# Patient Record
Sex: MALE | Race: WHITE | NOT HISPANIC OR LATINO | Employment: OTHER | URBAN - METROPOLITAN AREA
[De-identification: names, ages, dates, MRNs, and addresses within clinical notes are randomized per-mention and may not be internally consistent; named-entity substitution may affect disease eponyms.]

---

## 2017-04-20 ENCOUNTER — ALLSCRIPTS OFFICE VISIT (OUTPATIENT)
Dept: OTHER | Facility: OTHER | Age: 62
End: 2017-04-20

## 2017-04-20 DIAGNOSIS — R07.9 CHEST PAIN: ICD-10-CM

## 2017-05-03 ENCOUNTER — HOSPITAL ENCOUNTER (OUTPATIENT)
Dept: NON INVASIVE DIAGNOSTICS | Facility: HOSPITAL | Age: 62
Discharge: HOME | End: 2017-05-03
Payer: COMMERCIAL

## 2017-05-03 DIAGNOSIS — R07.9 CHEST PAIN: ICD-10-CM

## 2017-05-03 PROCEDURE — 93017 CV STRESS TEST TRACING ONLY: CPT

## 2017-05-18 ENCOUNTER — ALLSCRIPTS OFFICE VISIT (OUTPATIENT)
Dept: OTHER | Facility: OTHER | Age: 62
End: 2017-05-18

## 2017-08-09 ENCOUNTER — ALLSCRIPTS OFFICE VISIT (OUTPATIENT)
Dept: OTHER | Facility: OTHER | Age: 62
End: 2017-08-09

## 2017-08-09 DIAGNOSIS — Z12.5 ENCOUNTER FOR SCREENING FOR MALIGNANT NEOPLASM OF PROSTATE: ICD-10-CM

## 2017-08-09 DIAGNOSIS — E55.9 VITAMIN D DEFICIENCY: ICD-10-CM

## 2017-08-09 DIAGNOSIS — Z13.220 ENCOUNTER FOR SCREENING FOR LIPOID DISORDERS: ICD-10-CM

## 2017-08-09 DIAGNOSIS — R53.83 OTHER FATIGUE: ICD-10-CM

## 2017-08-19 ENCOUNTER — TRANSCRIBE ORDERS (OUTPATIENT)
Dept: ADMINISTRATIVE | Facility: HOSPITAL | Age: 62
End: 2017-08-19

## 2017-08-19 ENCOUNTER — APPOINTMENT (OUTPATIENT)
Dept: LAB | Facility: MEDICAL CENTER | Age: 62
End: 2017-08-19
Payer: COMMERCIAL

## 2017-08-19 DIAGNOSIS — R53.83 OTHER FATIGUE: ICD-10-CM

## 2017-08-19 DIAGNOSIS — Z13.220 ENCOUNTER FOR SCREENING FOR LIPOID DISORDERS: ICD-10-CM

## 2017-08-19 DIAGNOSIS — Z12.5 ENCOUNTER FOR SCREENING FOR MALIGNANT NEOPLASM OF PROSTATE: ICD-10-CM

## 2017-08-19 DIAGNOSIS — E55.9 VITAMIN D DEFICIENCY: ICD-10-CM

## 2017-08-19 LAB
25(OH)D3 SERPL-MCNC: 25.7 NG/ML (ref 30–100)
ALBUMIN SERPL BCP-MCNC: 3.5 G/DL (ref 3.5–5)
ALP SERPL-CCNC: 62 U/L (ref 46–116)
ALT SERPL W P-5'-P-CCNC: 34 U/L (ref 12–78)
ANION GAP SERPL CALCULATED.3IONS-SCNC: 5 MMOL/L (ref 4–13)
AST SERPL W P-5'-P-CCNC: 29 U/L (ref 5–45)
BASOPHILS # BLD AUTO: 0.05 THOUSANDS/ΜL (ref 0–0.1)
BASOPHILS NFR BLD AUTO: 1 % (ref 0–1)
BILIRUB SERPL-MCNC: 1.31 MG/DL (ref 0.2–1)
BUN SERPL-MCNC: 10 MG/DL (ref 5–25)
CALCIUM SERPL-MCNC: 8.7 MG/DL (ref 8.3–10.1)
CHLORIDE SERPL-SCNC: 107 MMOL/L (ref 100–108)
CHOLEST SERPL-MCNC: 143 MG/DL (ref 50–200)
CO2 SERPL-SCNC: 26 MMOL/L (ref 21–32)
CREAT SERPL-MCNC: 0.77 MG/DL (ref 0.6–1.3)
EOSINOPHIL # BLD AUTO: 0.3 THOUSAND/ΜL (ref 0–0.61)
EOSINOPHIL NFR BLD AUTO: 6 % (ref 0–6)
ERYTHROCYTE [DISTWIDTH] IN BLOOD BY AUTOMATED COUNT: 13.9 % (ref 11.6–15.1)
GFR SERPL CREATININE-BSD FRML MDRD: 96 ML/MIN/1.73SQ M
GLUCOSE P FAST SERPL-MCNC: 84 MG/DL (ref 65–99)
HCT VFR BLD AUTO: 42.7 % (ref 36.5–49.3)
HDLC SERPL-MCNC: 59 MG/DL (ref 40–60)
HGB BLD-MCNC: 14 G/DL (ref 12–17)
LDLC SERPL CALC-MCNC: 73 MG/DL (ref 0–100)
LYMPHOCYTES # BLD AUTO: 1.46 THOUSANDS/ΜL (ref 0.6–4.47)
LYMPHOCYTES NFR BLD AUTO: 30 % (ref 14–44)
MCH RBC QN AUTO: 29.4 PG (ref 26.8–34.3)
MCHC RBC AUTO-ENTMCNC: 32.8 G/DL (ref 31.4–37.4)
MCV RBC AUTO: 90 FL (ref 82–98)
MONOCYTES # BLD AUTO: 0.42 THOUSAND/ΜL (ref 0.17–1.22)
MONOCYTES NFR BLD AUTO: 9 % (ref 4–12)
NEUTROPHILS # BLD AUTO: 2.68 THOUSANDS/ΜL (ref 1.85–7.62)
NEUTS SEG NFR BLD AUTO: 54 % (ref 43–75)
NRBC BLD AUTO-RTO: 0 /100 WBCS
PLATELET # BLD AUTO: 291 THOUSANDS/UL (ref 149–390)
PMV BLD AUTO: 9.5 FL (ref 8.9–12.7)
POTASSIUM SERPL-SCNC: 4.3 MMOL/L (ref 3.5–5.3)
PROT SERPL-MCNC: 6.4 G/DL (ref 6.4–8.2)
RBC # BLD AUTO: 4.77 MILLION/UL (ref 3.88–5.62)
SODIUM SERPL-SCNC: 138 MMOL/L (ref 136–145)
TRIGL SERPL-MCNC: 53 MG/DL
TSH SERPL DL<=0.05 MIU/L-ACNC: 1.46 UIU/ML (ref 0.36–3.74)
WBC # BLD AUTO: 4.92 THOUSAND/UL (ref 4.31–10.16)

## 2017-08-19 PROCEDURE — 85025 COMPLETE CBC W/AUTO DIFF WBC: CPT

## 2017-08-19 PROCEDURE — G0103 PSA SCREENING: HCPCS

## 2017-08-19 PROCEDURE — 84443 ASSAY THYROID STIM HORMONE: CPT

## 2017-08-19 PROCEDURE — 36415 COLL VENOUS BLD VENIPUNCTURE: CPT

## 2017-08-19 PROCEDURE — 80053 COMPREHEN METABOLIC PANEL: CPT

## 2017-08-19 PROCEDURE — 80061 LIPID PANEL: CPT

## 2017-08-19 PROCEDURE — 82306 VITAMIN D 25 HYDROXY: CPT

## 2017-08-20 LAB — PSA SERPL-MCNC: 3 NG/ML (ref 0–4)

## 2017-10-25 ENCOUNTER — GENERIC CONVERSION - ENCOUNTER (OUTPATIENT)
Dept: OTHER | Facility: OTHER | Age: 62
End: 2017-10-25

## 2017-11-14 ENCOUNTER — GENERIC CONVERSION - ENCOUNTER (OUTPATIENT)
Dept: OTHER | Facility: OTHER | Age: 62
End: 2017-11-14

## 2017-11-15 ENCOUNTER — GENERIC CONVERSION - ENCOUNTER (OUTPATIENT)
Dept: OTHER | Facility: OTHER | Age: 62
End: 2017-11-15

## 2017-12-22 ENCOUNTER — GENERIC CONVERSION - ENCOUNTER (OUTPATIENT)
Dept: FAMILY MEDICINE CLINIC | Facility: CLINIC | Age: 62
End: 2017-12-22

## 2018-01-13 VITALS
WEIGHT: 171 LBS | BODY MASS INDEX: 21.94 KG/M2 | DIASTOLIC BLOOD PRESSURE: 62 MMHG | TEMPERATURE: 96.7 F | HEIGHT: 74 IN | SYSTOLIC BLOOD PRESSURE: 110 MMHG

## 2018-01-13 VITALS
WEIGHT: 220 LBS | BODY MASS INDEX: 28.23 KG/M2 | SYSTOLIC BLOOD PRESSURE: 114 MMHG | HEIGHT: 74 IN | DIASTOLIC BLOOD PRESSURE: 70 MMHG

## 2018-01-15 VITALS
HEIGHT: 74 IN | WEIGHT: 218.25 LBS | BODY MASS INDEX: 28.01 KG/M2 | DIASTOLIC BLOOD PRESSURE: 60 MMHG | SYSTOLIC BLOOD PRESSURE: 110 MMHG

## 2018-01-16 NOTE — MISCELLANEOUS
Dear Fang Nguyen,    Please call our office at your earilest convenience to discuss instructions from the doctor  Thank you            Electronically signed Rashaad Killian   Nov 15 2017 12:39PM EST Author

## 2018-01-22 VITALS
HEART RATE: 85 BPM | SYSTOLIC BLOOD PRESSURE: 114 MMHG | HEIGHT: 74 IN | BODY MASS INDEX: 28.49 KG/M2 | OXYGEN SATURATION: 95 % | DIASTOLIC BLOOD PRESSURE: 58 MMHG | WEIGHT: 222 LBS

## 2018-01-29 ENCOUNTER — OFFICE VISIT (OUTPATIENT)
Dept: FAMILY MEDICINE CLINIC | Facility: CLINIC | Age: 63
End: 2018-01-29
Payer: COMMERCIAL

## 2018-01-29 VITALS
HEIGHT: 78 IN | HEART RATE: 80 BPM | TEMPERATURE: 96.6 F | OXYGEN SATURATION: 98 % | BODY MASS INDEX: 24.88 KG/M2 | WEIGHT: 215 LBS | SYSTOLIC BLOOD PRESSURE: 132 MMHG | DIASTOLIC BLOOD PRESSURE: 72 MMHG

## 2018-01-29 DIAGNOSIS — R42 VERTIGO: Primary | ICD-10-CM

## 2018-01-29 DIAGNOSIS — Z12.11 SCREENING FOR COLON CANCER: ICD-10-CM

## 2018-01-29 PROBLEM — H93.12 TINNITUS OF LEFT EAR: Status: ACTIVE | Noted: 2018-01-29

## 2018-01-29 PROBLEM — E55.9 MILD VITAMIN D DEFICIENCY: Status: ACTIVE | Noted: 2017-08-09

## 2018-01-29 PROBLEM — N40.0 BPH WITHOUT URINARY OBSTRUCTION: Status: ACTIVE | Noted: 2017-10-25

## 2018-01-29 PROCEDURE — 99213 OFFICE O/P EST LOW 20 MIN: CPT | Performed by: FAMILY MEDICINE

## 2018-01-29 RX ORDER — TRIAMCINOLONE ACETONIDE 1 MG/G
CREAM TOPICAL 2 TIMES DAILY
COMMUNITY
Start: 2017-08-09 | End: 2020-11-04

## 2018-01-29 NOTE — PROGRESS NOTES
Assessment/Plan:    No problem-specific Assessment & Plan notes found for this encounter  the patient will go for MRI of the brain     Diagnoses and all orders for this visit:    Vertigo    Other orders  -     piroxicam (FELDENE) 20 mg capsule; Take 1 capsule by mouth Daily  -     triamcinolone (KENALOG) 0 1 % cream; Apply topically Twice daily          Subjective:   Chief Complaint   Patient presents with    Tinnitus     off and on    Dizziness     This morning        Patient ID: Marni Prabhakar is a 57 y o  male  Dizziness   Pertinent negatives include no chest pain, congestion, headaches, numbness or weakness  The following portions of the patient's history were reviewed and updated as appropriate: allergies, current medications, past family history, past medical history, past social history, past surgical history and problem list     Review of Systems   Constitutional: Negative  HENT: Positive for tinnitus  Negative for congestion, ear pain, hearing loss, postnasal drip, rhinorrhea, sinus pain and sinus pressure  Eyes: Negative  Respiratory: Negative  Negative for shortness of breath  Cardiovascular: Negative  Negative for chest pain  Gastrointestinal: Negative  Endocrine: Negative  Genitourinary: Negative  Musculoskeletal: Negative  Skin: Negative  Allergic/Immunologic: Negative  Neurological: Positive for dizziness  Negative for tremors, seizures, syncope, facial asymmetry, speech difficulty, weakness, light-headedness, numbness and headaches  Hematological: Negative  Psychiatric/Behavioral: Negative  Objective:     Physical Exam   Constitutional: He is oriented to person, place, and time  He appears well-developed and well-nourished  No distress  HENT:   Head: Normocephalic  Right Ear: External ear normal    Left Ear: External ear normal    Mouth/Throat: Oropharynx is clear and moist  No oropharyngeal exudate     Eyes: EOM are normal  Pupils are equal, round, and reactive to light  Right eye exhibits no discharge  Left eye exhibits no discharge  No scleral icterus  Neck: Normal range of motion  Neck supple  No thyromegaly present  Cardiovascular: Normal rate, regular rhythm, normal heart sounds and intact distal pulses  Exam reveals no gallop and no friction rub  No murmur heard  Pulmonary/Chest: Effort normal and breath sounds normal  No respiratory distress  He has no wheezes  He has no rales  He exhibits no tenderness  Abdominal: Soft  Bowel sounds are normal  He exhibits no distension  There is no tenderness  There is no rebound and no guarding  Musculoskeletal: Normal range of motion  He exhibits no edema or tenderness  Lymphadenopathy:     He has no cervical adenopathy  Neurological: He is oriented to person, place, and time  No cranial nerve deficit  He exhibits normal muscle tone  Coordination normal    Skin: Skin is warm and dry  No rash noted  He is not diaphoretic  No erythema  No pallor  Psychiatric: He has a normal mood and affect   His behavior is normal  Judgment and thought content normal

## 2018-02-02 DIAGNOSIS — Z01.818 PREPROCEDURAL EXAMINATION: Primary | ICD-10-CM

## 2018-02-06 ENCOUNTER — APPOINTMENT (OUTPATIENT)
Dept: LAB | Facility: HOSPITAL | Age: 63
End: 2018-02-06
Payer: COMMERCIAL

## 2018-02-06 DIAGNOSIS — I15.9 SECONDARY HYPERTENSION: Primary | ICD-10-CM

## 2018-02-06 DIAGNOSIS — Z01.818 PREPROCEDURAL EXAMINATION: ICD-10-CM

## 2018-02-06 LAB
BUN SERPL-MCNC: 13 MG/DL (ref 5–25)
CREAT SERPL-MCNC: 0.74 MG/DL (ref 0.6–1.3)
GFR SERPL CREATININE-BSD FRML MDRD: 97 ML/MIN/1.73SQ M

## 2018-02-06 PROCEDURE — 82565 ASSAY OF CREATININE: CPT

## 2018-02-06 PROCEDURE — 36415 COLL VENOUS BLD VENIPUNCTURE: CPT

## 2018-02-06 PROCEDURE — 84520 ASSAY OF UREA NITROGEN: CPT

## 2018-02-08 ENCOUNTER — TELEPHONE (OUTPATIENT)
Dept: FAMILY MEDICINE CLINIC | Facility: CLINIC | Age: 63
End: 2018-02-08

## 2018-02-08 ENCOUNTER — HOSPITAL ENCOUNTER (OUTPATIENT)
Dept: MRI IMAGING | Facility: HOSPITAL | Age: 63
Discharge: HOME | End: 2018-02-08
Payer: COMMERCIAL

## 2018-02-08 DIAGNOSIS — R42 VERTIGO: ICD-10-CM

## 2018-02-08 DIAGNOSIS — D32.9 MENINGIOMA (HCC): Primary | ICD-10-CM

## 2018-02-08 PROCEDURE — 70553 MRI BRAIN STEM W/O & W/DYE: CPT

## 2018-02-08 PROCEDURE — A9585 GADOBUTROL INJECTION: HCPCS | Performed by: FAMILY MEDICINE

## 2018-02-08 RX ADMIN — GADOBUTROL 9 ML: 604.72 INJECTION INTRAVENOUS at 07:57

## 2018-02-08 NOTE — TELEPHONE ENCOUNTER
----- Message from Dev Wilkes DO sent at 2/8/2018 12:43 PM EST -----  Call patient  MRI of the brain shows small meningioma on the left temporal region    Referral to Neurosurgery

## 2018-02-21 ENCOUNTER — OFFICE VISIT (OUTPATIENT)
Dept: FAMILY MEDICINE CLINIC | Facility: CLINIC | Age: 63
End: 2018-02-21
Payer: COMMERCIAL

## 2018-02-21 VITALS
SYSTOLIC BLOOD PRESSURE: 120 MMHG | DIASTOLIC BLOOD PRESSURE: 60 MMHG | HEIGHT: 78 IN | BODY MASS INDEX: 25.08 KG/M2 | WEIGHT: 216.8 LBS

## 2018-02-21 DIAGNOSIS — D32.9 MENINGIOMA (HCC): Primary | ICD-10-CM

## 2018-02-21 PROCEDURE — 99213 OFFICE O/P EST LOW 20 MIN: CPT | Performed by: FAMILY MEDICINE

## 2018-02-21 NOTE — PROGRESS NOTES
Assessment/Plan:    No problem-specific Assessment & Plan notes found for this encounter  Diagnoses and all orders for this visit:    Meningioma [facility])          Subjective:   Chief Complaint   Patient presents with    Follow-up     4 week follow up on vertigo    Follow-up     MRI results         Patient ID: Vinod Cheung is a 57 y o  male  Patient here for follow-up on vertigo  Patient had MRI done  No recurrent attacks of vertigo  No dizziness or chest pain or shortness of breath  Patient is seeing vascular surgeon for varicose veins today  The following portions of the patient's history were reviewed and updated as appropriate: allergies, current medications, past family history, past medical history, past social history, past surgical history and problem list     Review of Systems   Constitutional: Negative  HENT: Negative  Eyes: Negative  Respiratory: Negative  Cardiovascular: Negative  Gastrointestinal: Negative  Endocrine: Negative  Genitourinary: Negative  Musculoskeletal: Negative  Skin: Negative  Allergic/Immunologic: Negative  Neurological: Negative  Hematological: Negative  Psychiatric/Behavioral: Negative  Objective:      /60 (BP Location: Left arm, Patient Position: Sitting, Cuff Size: Standard)   Ht 6' 6" (1 981 m)   Wt 98 3 kg (216 lb 12 8 oz)   BMI 25 05 kg/m²          Physical Exam   Constitutional: He is oriented to person, place, and time  He appears well-developed and well-nourished  No distress  HENT:   Head: Normocephalic  Right Ear: External ear normal    Left Ear: External ear normal    Mouth/Throat: Oropharynx is clear and moist  No oropharyngeal exudate  Eyes: EOM are normal  Pupils are equal, round, and reactive to light  Right eye exhibits no discharge  Left eye exhibits no discharge  No scleral icterus  Neck: Normal range of motion  Neck supple  No thyromegaly present     Cardiovascular: Normal rate, regular rhythm, normal heart sounds and intact distal pulses  Exam reveals no gallop and no friction rub  No murmur heard  Pulmonary/Chest: Effort normal and breath sounds normal  No respiratory distress  He has no wheezes  He has no rales  He exhibits no tenderness  Abdominal: Soft  Bowel sounds are normal  He exhibits no distension  There is no tenderness  There is no rebound and no guarding  Musculoskeletal: Normal range of motion  He exhibits no edema or tenderness  Lymphadenopathy:     He has no cervical adenopathy  Neurological: He is oriented to person, place, and time  No cranial nerve deficit  He exhibits normal muscle tone  Coordination normal    Skin: Skin is warm and dry  No rash noted  He is not diaphoretic  No erythema  No pallor  Psychiatric: He has a normal mood and affect  His behavior is normal  Judgment and thought content normal    Nursing note and vitals reviewed

## 2018-03-15 ENCOUNTER — OFFICE VISIT (OUTPATIENT)
Dept: NEUROSURGERY | Facility: CLINIC | Age: 63
End: 2018-03-15
Payer: COMMERCIAL

## 2018-03-15 VITALS
HEIGHT: 73 IN | DIASTOLIC BLOOD PRESSURE: 71 MMHG | BODY MASS INDEX: 28.52 KG/M2 | WEIGHT: 215.2 LBS | RESPIRATION RATE: 14 BRPM | HEART RATE: 72 BPM | SYSTOLIC BLOOD PRESSURE: 128 MMHG | TEMPERATURE: 96.4 F

## 2018-03-15 DIAGNOSIS — D33.0 BENIGN NEOPLASM OF SUPRATENTORIAL REGION OF BRAIN (HCC): ICD-10-CM

## 2018-03-15 DIAGNOSIS — D32.9 MENINGIOMA (HCC): ICD-10-CM

## 2018-03-15 DIAGNOSIS — R93.0 ABNORMAL MRI OF HEAD: Primary | ICD-10-CM

## 2018-03-15 PROCEDURE — 99243 OFF/OP CNSLTJ NEW/EST LOW 30: CPT | Performed by: PHYSICIAN ASSISTANT

## 2018-03-15 NOTE — PATIENT INSTRUCTIONS
Continue with all usual activities without restriction  He does understand should there be any changes in speech pattern speech word finding or other neurologic changes he is to return sooner for reassessment      Further follow-up is planned in approximately 6 months, with MRI brain with without contrast, Dr Fernando Moscoso, [facility])

## 2018-03-15 NOTE — LETTER
March 15, 2018     Yordan Cruz, [address]    Patient: Brayden Aguilar   YOB: 1955   Date of Visit: 3/15/2018       Dear Dr Noam Crain:    Thank you for referring Alvira Gowers to me for evaluation  Below are my notes for this consultation  If you have questions, please do not hesitate to call me  I look forward to following your patient along with you  Sincerely,        Avril Andrade MD        CC: No Recipients  Erwin North PA-C  3/15/2018  9:18 AM  Sign at close encounter  Assessment/Plan:  Very pleasant 77-year-old male, presents to review MRI brain 2/8/18  He was seen in his primary care provider's office with complaint of tinnitus and vertigo, imaging was ordered  The study was carefully reviewed in detail by Dr Isela Santamaria, and reveals in approximate 1 2 cm mass, left temporal region, posterior to the sylvian fissure consistent with a probable meningioma  He has no prior history of MRI and/or CT scan of the head  Clinically he is asymptomatic of this lesion, there are no focal neurologic deficits on examination, and specifically there are no speech issues, difficulty with word finding, word understanding, or appropriate word use  Further follow-up is planned in approximately 6 months to assess for stability of this lesion  The patient does understand should he have any new speech difficulties or other neurologic changes he is to return sooner for reassessment  These findings, impressions and recommendations are reviewed in great detail with the patient, he expressed understanding and agreement, his questions were answered completely and to his satisfaction  Follow up has been scheduled               Diagnoses and all orders for this visit:    Abnormal MRI of head  -     MRI brain with and without contrast; Future    Meningioma [facility])  -     Ambulatory referral to Neurosurgery  -     MRI brain with and without contrast; Future    Benign neoplasm of supratentorial region of brain [facility])  -     MRI brain with and without contrast; Future          Subjective:      Patient ID: Bean Avalos is a 57 y o  male  Very pleasant 69-year-old male, presents to review MRI of the brain  He has a history of an episode of vertigo, as well as tinnitus, MRI was ordered as part of the evaluation process  Incidental note is made of a probable meningioma on the MRI  He reports his tinnitus is not bothersome, and principally in his left ear  He reports his vertigo symptoms are resolved  Otherwise he denies gait or balance disturbance, motor or sensory difficulties in the upper lower extremities, bowel or bladder incontinence, difficulty with memory or mentation, difficulty with speech issues  The following portions of the patient's history were reviewed and updated as appropriate: allergies, current medications, past family history, past medical history, past social history and past surgical history  Review of Systems   HENT: Negative  Eyes: Negative  Respiratory: Negative  Cardiovascular: Negative  Gastrointestinal: Negative  Endocrine: Negative  Genitourinary: Positive for frequency  Negative for decreased urine volume, difficulty urinating, discharge, dysuria, enuresis, flank pain, genital sores, hematuria, penile pain, penile swelling, scrotal swelling, testicular pain and urgency  Musculoskeletal: Positive for back pain (on and off), neck pain (from work) and neck stiffness (from work)  Negative for arthralgias, gait problem, joint swelling and myalgias  Skin: Negative  Allergic/Immunologic: Negative  Neurological: Positive for weakness (in the knee)  Negative for dizziness, tremors, seizures, syncope, facial asymmetry, speech difficulty, light-headedness, numbness and headaches  Hematological: Negative  Psychiatric/Behavioral: Negative          Objective:    Physical Exam Constitutional: He is oriented to person, place, and time  He appears well-developed and well-nourished  HENT:   Head: Normocephalic and atraumatic  Eyes: Pupils are equal, round, and reactive to light  Neck: Normal range of motion  Cardiovascular: Normal rate, regular rhythm and normal heart sounds  Pulmonary/Chest: Effort normal and breath sounds normal    Musculoskeletal: Normal range of motion  Neurological: He is alert and oriented to person, place, and time  He has a normal Finger-Nose-Finger Test and a normal Romberg Test  Gait normal    Reflex Scores:       Patellar reflexes are 2+ on the right side and 2+ on the left side  Skin: Skin is warm and dry  Psychiatric: His speech is normal        Neurologic Exam     Mental Status   Oriented to person, place, and time  Speech: speech is normal   Level of consciousness: alert  Able to name object  Able to read  Able to repeat  Normal comprehension  Cranial Nerves   Cranial nerves II through XII intact  CN III, IV, VI   Pupils are equal, round, and reactive to light      Motor Exam   Muscle bulk: normal    Strength   Right neck flexion: 5/5  Left neck flexion: 5/5  Right neck extension: 5/5  Left neck extension: 5/5  Right deltoid: 5/5  Left deltoid: 5/5  Right biceps: 5/5  Left biceps: 5/5  Right triceps: 5/5  Left triceps: 5/5  Right wrist flexion: 5/5  Left wrist flexion: 5/5  Right wrist extension: 5/5  Left wrist extension: 5/5  Right interossei: 5/5  Left interossei: 5/5  Right abdominals: 5/5  Left abdominals: 5/5  Right iliopsoas: 5/5  Left iliopsoas: 5/5  Right quadriceps: 5/5  Left quadriceps: 5/5  Right hamstrin/5  Left hamstrin/5  Right glutei: 5/5  Left glutei: 5/5  Right anterior tibial: 5/5  Left anterior tibial: 5/5  Right posterior tibial: 5/5  Left posterior tibial: 5/5  Right peroneal: 5/5  Left peroneal: 5/5  Right gastroc: 5/5  Left gastroc: 5/5    Sensory Exam   Light touch normal      Gait, Coordination, and Reflexes     Gait  Gait: normal    Coordination   Romberg: negative  Finger to nose coordination: normal    Reflexes   Right patellar: 2+  Left patellar: 2+       MRI BRAIN AND IAC'S -  WITH AND WITHOUT CONTRAST (2/8/18)     INDICATION:  Vertigo      COMPARISON:  None      TECHNIQUE:  Brain:   Sagittal T1, axial T2, axial Gradient, axial T1, axial FLAIR, axial diffusion imaging  Axial T1 postcontrast   Axial BRAVO post contrast   IAC'S:  Coronal FIESTA, coronal T1 postcontrast, axial T1 postcontrast with fat suppression  Targeted images of the IAC'S were performed requiring additional time at acquisition and interpretation of approximately 25%     IV Contrast:  9 mL of gadobutrol injection (MULTI-DOSE)      IMAGE QUALITY:   Diagnostic      FINDINGS:     BRAIN PARENCHYMA:  There is a homogeneously enhancing dural based mass identified within the left temporal region posterior to the sylvian fissure measuring 1 2 cm in craniocaudad dimension consistent with small meningioma  Only minimal mass effect upon   the adjacent brain parenchyma without edema      Appropriate gray-white differentiation  Normal basal ganglia and basilar cisterns  Brainstem and cerebellum demonstrate normal signal   Normal corpus callosum and hypothalamus      Diffusion imaging is unremarkable  No acute or chronic hemorrhage  Postcontrast imaging of the remainder of the brain parenchyma is unremarkable      IAC'S:  No CP angle mass or abnormal enhancement    Normal aeration of the mastoid air cells and middle ear cavity      VENTRICLES:  Normal      SELLA AND PITUITARY GLAND:  Normal      ORBITS:  Normal      PARANASAL SINUSES:  Mild mucosal thickening of the paranasal sinuses      VASCULATURE:  Evaluation of the major intracranial vasculature demonstrates appropriate flow voids      CALVARIUM AND SKULL BASE:  Normal      EXTRACRANIAL SOFT TISSUES:  Normal      IMPRESSION:     1 2 cm meningioma within the left hemisphere peripherally, posterior to the sylvian fissure, see series 13 image 13 and series 12 image 13  No significant mass effect upon the brain parenchyma  No edema within the adjacent brain parenchyma      Otherwise unremarkable MRI of the brain and IACs with no etiology for vertigo

## 2018-03-15 NOTE — LETTER
March 15, 2018     Filibertojose eduardo Kaleb, [address]    Patient: Clarissa Sue   YOB: 1955   Date of Visit: 3/15/2018       Dear Dr Heather Doe:    Thank you for referring Flaca Kaur to me for evaluation  Below are my notes for this consultation  If you have questions, please do not hesitate to call me  I look forward to following your patient along with you  Sincerely,        Chacha Faustin MD        CC: No Recipients  Jairo Nicolas PA-C  3/15/2018  9:16 AM  Sign at close encounter  Assessment/Plan:  Very pleasant 68-year-old male, presents to review MRI brain 2/8/18  He was seen in his primary care provider's office with complaint of tinnitus and vertigo, imaging was ordered  The study was carefully reviewed in detail by Dr Davonte Flores, and reveals in approximate 1 2 cm mass, left temporal region, posterior to the sylvian fissure consistent with a probable meningioma  He has no prior history of MRI and/or CT scan of the head  Clinically he is asymptomatic of this lesion, there are no focal neurologic deficits on examination, and specifically there are no speech issues, difficulty with word finding, word understanding, or appropriate word use  Further follow-up is planned in approximately 6 months to assess for stability of this lesion  The patient does understand should he have any new speech difficulties or other neurologic changes he is to return sooner for reassessment  These findings, impressions and recommendations are reviewed in great detail with the patient, he expressed understanding and agreement, his questions were answered completely and to his satisfaction  Follow up has been scheduled  {Assess[facility]:78209}      Subjective:      Patient ID: Clarissa Sue is a 57 y o  male  Very pleasant 68-year-old male, presents to review MRI of the brain      He has a history of an episode of vertigo, as well as tinnitus, MRI was ordered as part of the evaluation process  Incidental note is made of a probable meningioma on the MRI  He reports his tinnitus is not bothersome, and principally in his left ear  He reports his vertigo symptoms are resolved  Otherwise he denies gait or balance disturbance, motor or sensory difficulties in the upper lower extremities, bowel or bladder incontinence, difficulty with memory or mentation, difficulty with speech issues  {Common ambulatory SmartLinks:35946}    Review of Systems   HENT: Negative  Eyes: Negative  Respiratory: Negative  Cardiovascular: Negative  Gastrointestinal: Negative  Endocrine: Negative  Genitourinary: Positive for frequency  Negative for decreased urine volume, difficulty urinating, discharge, dysuria, enuresis, flank pain, genital sores, hematuria, penile pain, penile swelling, scrotal swelling, testicular pain and urgency  Musculoskeletal: Positive for back pain (on and off), neck pain (from work) and neck stiffness (from work)  Negative for arthralgias, gait problem, joint swelling and myalgias  Skin: Negative  Allergic/Immunologic: Negative  Neurological: Positive for weakness (in the knee)  Negative for dizziness, tremors, seizures, syncope, facial asymmetry, speech difficulty, light-headedness, numbness and headaches  Hematological: Negative  Psychiatric/Behavioral: Negative  Objective:    Physical Exam   Constitutional: He is oriented to person, place, and time  He appears well-developed and well-nourished  HENT:   Head: Normocephalic and atraumatic  Eyes: Pupils are equal, round, and reactive to light  Neck: Normal range of motion  Cardiovascular: Normal rate, regular rhythm and normal heart sounds  Pulmonary/Chest: Effort normal and breath sounds normal    Musculoskeletal: Normal range of motion  Neurological: He is alert and oriented to person, place, and time   He has a normal Finger-Nose-Finger Test and a normal Romberg Test  Gait normal    Reflex Scores:       Patellar reflexes are 2+ on the right side and 2+ on the left side  Skin: Skin is warm and dry  Psychiatric: His speech is normal        Neurologic Exam     Mental Status   Oriented to person, place, and time  Speech: speech is normal   Level of consciousness: alert  Able to name object  Able to read  Able to repeat  Normal comprehension  Cranial Nerves   Cranial nerves II through XII intact  CN III, IV, VI   Pupils are equal, round, and reactive to light  Motor Exam   Muscle bulk: normal    Strength   Right neck flexion: 5/5  Left neck flexion: 5/5  Right neck extension: 5/5  Left neck extension: 5/5  Right deltoid: 5/5  Left deltoid: 5/5  Right biceps: 5/5  Left biceps: 5/5  Right triceps: 5/5  Left triceps: 5/5  Right wrist flexion: 5/5  Left wrist flexion: 5/5  Right wrist extension: 5/5  Left wrist extension: 5/5  Right interossei: 5/5  Left interossei: 5/5  Right abdominals: 5/5  Left abdominals: 5/5  Right iliopsoas: 5/5  Left iliopsoas: 5/5  Right quadriceps: 5/5  Left quadriceps: 5/5  Right hamstrin/5  Left hamstrin/5  Right glutei: 5/5  Left glutei: 5/5  Right anterior tibial: 5/5  Left anterior tibial: 5/5  Right posterior tibial: 5/5  Left posterior tibial: 5/5  Right peroneal: 5/5  Left peroneal: 5/5  Right gastroc: 5/5  Left gastroc: 5/5    Sensory Exam   Light touch normal      Gait, Coordination, and Reflexes     Gait  Gait: normal    Coordination   Romberg: negative  Finger to nose coordination: normal    Reflexes   Right patellar: 2+  Left patellar: 2+       MRI BRAIN AND IAC'S -  WITH AND WITHOUT CONTRAST (18)     INDICATION:  Vertigo      COMPARISON:  None      TECHNIQUE:  Brain:   Sagittal T1, axial T2, axial Gradient, axial T1, axial FLAIR, axial diffusion imaging    Axial T1 postcontrast   Axial BRAVO post contrast   IAC'S:  Coronal FIESTA, coronal T1 postcontrast, axial T1 postcontrast with fat suppression  Targeted images of the IAC'S were performed requiring additional time at acquisition and interpretation of approximately 25%     IV Contrast:  9 mL of gadobutrol injection (MULTI-DOSE)      IMAGE QUALITY:   Diagnostic      FINDINGS:     BRAIN PARENCHYMA:  There is a homogeneously enhancing dural based mass identified within the left temporal region posterior to the sylvian fissure measuring 1 2 cm in craniocaudad dimension consistent with small meningioma  Only minimal mass effect upon   the adjacent brain parenchyma without edema      Appropriate gray-white differentiation  Normal basal ganglia and basilar cisterns  Brainstem and cerebellum demonstrate normal signal   Normal corpus callosum and hypothalamus      Diffusion imaging is unremarkable  No acute or chronic hemorrhage  Postcontrast imaging of the remainder of the brain parenchyma is unremarkable      IAC'S:  No CP angle mass or abnormal enhancement  Normal aeration of the mastoid air cells and middle ear cavity      VENTRICLES:  Normal      SELLA AND PITUITARY GLAND:  Normal      ORBITS:  Normal      PARANASAL SINUSES:  Mild mucosal thickening of the paranasal sinuses      VASCULATURE:  Evaluation of the major intracranial vasculature demonstrates appropriate flow voids      CALVARIUM AND SKULL BASE:  Normal      EXTRACRANIAL SOFT TISSUES:  Normal      IMPRESSION:     1 2 cm meningioma within the left hemisphere peripherally, posterior to the sylvian fissure, see series 13 image 13 and series 12 image 13  No significant mass effect upon the brain parenchyma  No edema within the adjacent brain parenchyma      Otherwise unremarkable MRI of the brain and IACs with no etiology for vertigo

## 2018-03-15 NOTE — PROGRESS NOTES
Assessment/Plan:  Very pleasant 69-year-old male, presents to review MRI brain 2/8/18  He was seen in his primary care provider's office with complaint of tinnitus and vertigo, imaging was ordered  The study was carefully reviewed in detail by Dr Laure Uriostegui, and reveals in approximate 1 2 cm mass, left temporal region, posterior to the sylvian fissure consistent with a probable meningioma  He has no prior history of MRI and/or CT scan of the head  Clinically he is asymptomatic of this lesion, there are no focal neurologic deficits on examination, and specifically there are no speech issues, difficulty with word finding, word understanding, or appropriate word use  Further follow-up is planned in approximately 6 months to assess for stability of this lesion  The patient does understand should he have any new speech difficulties or other neurologic changes he is to return sooner for reassessment  These findings, impressions and recommendations are reviewed in great detail with the patient, he expressed understanding and agreement, his questions were answered completely and to his satisfaction  Follow up has been scheduled  Diagnoses and all orders for this visit:    Abnormal MRI of head  -     MRI brain with and without contrast; Future    Meningioma [facility])  -     Ambulatory referral to Neurosurgery  -     MRI brain with and without contrast; Future    Benign neoplasm of supratentorial region of brain ([facility] [address] )  -     MRI brain with and without contrast; Future          Subjective:      Patient ID: Zak Sosa is a 57 y o  male  Very pleasant 69-year-old male, presents to review MRI of the brain  He has a history of an episode of vertigo, as well as tinnitus, MRI was ordered as part of the evaluation process  Incidental note is made of a probable meningioma on the MRI  He reports his tinnitus is not bothersome, and principally in his left ear    He reports his vertigo symptoms are resolved  Otherwise he denies gait or balance disturbance, motor or sensory difficulties in the upper lower extremities, bowel or bladder incontinence, difficulty with memory or mentation, difficulty with speech issues  The following portions of the patient's history were reviewed and updated as appropriate: allergies, current medications, past family history, past medical history, past social history and past surgical history  Review of Systems   HENT: Negative  Eyes: Negative  Respiratory: Negative  Cardiovascular: Negative  Gastrointestinal: Negative  Endocrine: Negative  Genitourinary: Positive for frequency  Negative for decreased urine volume, difficulty urinating, discharge, dysuria, enuresis, flank pain, genital sores, hematuria, penile pain, penile swelling, scrotal swelling, testicular pain and urgency  Musculoskeletal: Positive for back pain (on and off), neck pain (from work) and neck stiffness (from work)  Negative for arthralgias, gait problem, joint swelling and myalgias  Skin: Negative  Allergic/Immunologic: Negative  Neurological: Positive for weakness (in the knee)  Negative for dizziness, tremors, seizures, syncope, facial asymmetry, speech difficulty, light-headedness, numbness and headaches  Hematological: Negative  Psychiatric/Behavioral: Negative  Objective:    Physical Exam   Constitutional: He is oriented to person, place, and time  He appears well-developed and well-nourished  HENT:   Head: Normocephalic and atraumatic  Eyes: Pupils are equal, round, and reactive to light  Neck: Normal range of motion  Cardiovascular: Normal rate, regular rhythm and normal heart sounds  Pulmonary/Chest: Effort normal and breath sounds normal    Musculoskeletal: Normal range of motion  Neurological: He is alert and oriented to person, place, and time   He has a normal Finger-Nose-Finger Test and a normal Romberg Test  Gait normal    Reflex Scores:       Patellar reflexes are 2+ on the right side and 2+ on the left side  Skin: Skin is warm and dry  Psychiatric: His speech is normal        Neurologic Exam     Mental Status   Oriented to person, place, and time  Speech: speech is normal   Level of consciousness: alert  Able to name object  Able to read  Able to repeat  Normal comprehension  Cranial Nerves   Cranial nerves II through XII intact  CN III, IV, VI   Pupils are equal, round, and reactive to light  Motor Exam   Muscle bulk: normal    Strength   Right neck flexion: 5/5  Left neck flexion: 5/5  Right neck extension: 5/5  Left neck extension: 5/5  Right deltoid: 5/5  Left deltoid: 5/5  Right biceps: 5/5  Left biceps: 5/5  Right triceps: 5/5  Left triceps: 5/5  Right wrist flexion: 5/5  Left wrist flexion: 5/5  Right wrist extension: 5/5  Left wrist extension: 5/5  Right interossei: 5/5  Left interossei: 5/5  Right abdominals: 5/5  Left abdominals: 5/5  Right iliopsoas: 5/5  Left iliopsoas: 5/5  Right quadriceps: 5/5  Left quadriceps: 5/5  Right hamstrin/5  Left hamstrin/5  Right glutei: 5/5  Left glutei: 5/5  Right anterior tibial: 5/5  Left anterior tibial: 5/5  Right posterior tibial: 5/5  Left posterior tibial: 5/5  Right peroneal: 5/5  Left peroneal: 5/5  Right gastroc: 5/5  Left gastroc: 5/5    Sensory Exam   Light touch normal      Gait, Coordination, and Reflexes     Gait  Gait: normal    Coordination   Romberg: negative  Finger to nose coordination: normal    Reflexes   Right patellar: 2+  Left patellar: 2+       MRI BRAIN AND IAC'S -  WITH AND WITHOUT CONTRAST (18)     INDICATION:  Vertigo      COMPARISON:  None      TECHNIQUE:  Brain:   Sagittal T1, axial T2, axial Gradient, axial T1, axial FLAIR, axial diffusion imaging  Axial T1 postcontrast   Axial BRAVO post contrast   IAC'S:  Coronal FIESTA, coronal T1 postcontrast, axial T1 postcontrast with fat suppression    Targeted images of the IAC'S were performed requiring additional time at acquisition and interpretation of approximately 25%     IV Contrast:  9 mL of gadobutrol injection (MULTI-DOSE)      IMAGE QUALITY:   Diagnostic      FINDINGS:     BRAIN PARENCHYMA:  There is a homogeneously enhancing dural based mass identified within the left temporal region posterior to the sylvian fissure measuring 1 2 cm in craniocaudad dimension consistent with small meningioma  Only minimal mass effect upon   the adjacent brain parenchyma without edema      Appropriate gray-white differentiation  Normal basal ganglia and basilar cisterns  Brainstem and cerebellum demonstrate normal signal   Normal corpus callosum and hypothalamus      Diffusion imaging is unremarkable  No acute or chronic hemorrhage  Postcontrast imaging of the remainder of the brain parenchyma is unremarkable      IAC'S:  No CP angle mass or abnormal enhancement  Normal aeration of the mastoid air cells and middle ear cavity      VENTRICLES:  Normal      SELLA AND PITUITARY GLAND:  Normal      ORBITS:  Normal      PARANASAL SINUSES:  Mild mucosal thickening of the paranasal sinuses      VASCULATURE:  Evaluation of the major intracranial vasculature demonstrates appropriate flow voids      CALVARIUM AND SKULL BASE:  Normal      EXTRACRANIAL SOFT TISSUES:  Normal      IMPRESSION:     1 2 cm meningioma within the left hemisphere peripherally, posterior to the sylvian fissure, see series 13 image 13 and series 12 image 13  No significant mass effect upon the brain parenchyma  No edema within the adjacent brain parenchyma      Otherwise unremarkable MRI of the brain and IACs with no etiology for vertigo

## 2018-05-31 ENCOUNTER — TELEPHONE (OUTPATIENT)
Dept: FAMILY MEDICINE CLINIC | Facility: CLINIC | Age: 63
End: 2018-05-31

## 2018-09-07 ENCOUNTER — TELEPHONE (OUTPATIENT)
Dept: NEUROSURGERY | Facility: CLINIC | Age: 63
End: 2018-09-07

## 2018-09-07 DIAGNOSIS — D32.9 MENINGIOMA (HCC): Primary | ICD-10-CM

## 2018-09-07 NOTE — TELEPHONE ENCOUNTER
[facility] dept called to notify pt has a scheduled study 9/12 and has no script for needed labs  Orders placed and pt notified to have labs done by [facility]  He reports he has been having neck pain for 5-6 weeks and questions if his neck can be add to the study  Informed him it could not and he should contact his PCP to review symptoms

## 2018-09-08 ENCOUNTER — APPOINTMENT (OUTPATIENT)
Dept: LAB | Facility: HOSPITAL | Age: 63
End: 2018-09-08
Attending: NEUROLOGICAL SURGERY
Payer: COMMERCIAL

## 2018-09-08 DIAGNOSIS — D32.9 MENINGIOMA (HCC): ICD-10-CM

## 2018-09-08 LAB
BUN SERPL-MCNC: 12 MG/DL (ref 5–25)
CREAT SERPL-MCNC: 0.71 MG/DL (ref 0.6–1.3)
GFR SERPL CREATININE-BSD FRML MDRD: 98 ML/MIN/1.73SQ M

## 2018-09-08 PROCEDURE — 84520 ASSAY OF UREA NITROGEN: CPT

## 2018-09-08 PROCEDURE — 36415 COLL VENOUS BLD VENIPUNCTURE: CPT

## 2018-09-08 PROCEDURE — 82565 ASSAY OF CREATININE: CPT

## 2018-09-12 ENCOUNTER — HOSPITAL ENCOUNTER (OUTPATIENT)
Dept: MRI IMAGING | Facility: HOSPITAL | Age: 63
Discharge: HOME | End: 2018-09-12
Payer: COMMERCIAL

## 2018-09-12 ENCOUNTER — TELEPHONE (OUTPATIENT)
Dept: NEUROSURGERY | Facility: CLINIC | Age: 63
End: 2018-09-12

## 2018-09-12 DIAGNOSIS — D32.9 MENINGIOMA (HCC): ICD-10-CM

## 2018-09-12 DIAGNOSIS — D33.0 BENIGN NEOPLASM OF SUPRATENTORIAL REGION OF BRAIN (HCC): ICD-10-CM

## 2018-09-12 DIAGNOSIS — R93.0 ABNORMAL MRI OF HEAD: ICD-10-CM

## 2018-09-12 PROCEDURE — 70553 MRI BRAIN STEM W/O & W/DYE: CPT

## 2018-09-12 PROCEDURE — A9585 GADOBUTROL INJECTION: HCPCS | Performed by: PHYSICIAN ASSISTANT

## 2018-09-12 RX ADMIN — GADOBUTROL 10 ML: 604.72 INJECTION INTRAVENOUS at 09:58

## 2018-10-03 ENCOUNTER — OFFICE VISIT (OUTPATIENT)
Dept: FAMILY MEDICINE CLINIC | Facility: CLINIC | Age: 63
End: 2018-10-03
Payer: COMMERCIAL

## 2018-10-03 VITALS
TEMPERATURE: 96.5 F | WEIGHT: 212 LBS | DIASTOLIC BLOOD PRESSURE: 74 MMHG | BODY MASS INDEX: 28.1 KG/M2 | HEIGHT: 73 IN | SYSTOLIC BLOOD PRESSURE: 118 MMHG

## 2018-10-03 DIAGNOSIS — M54.2 CERVICALGIA: Primary | ICD-10-CM

## 2018-10-03 DIAGNOSIS — D32.9 MENINGIOMA (HCC): ICD-10-CM

## 2018-10-03 DIAGNOSIS — M25.562 ACUTE PAIN OF LEFT KNEE: ICD-10-CM

## 2018-10-03 PROCEDURE — 99214 OFFICE O/P EST MOD 30 MIN: CPT | Performed by: FAMILY MEDICINE

## 2018-10-03 NOTE — PROGRESS NOTES
Assessment/Plan:    67-year-old male with:  Cervicalgia, left knee pain and meningioma  Discussed treatment options with risks and benefits  Encouraged follow-up with his neurosurgeon  Will refer to physical therapy and continue anti-inflammatories and advised him to use heat and cold, stretching and bracing and call back if symptoms are not improving or if they worsen  No problem-specific Assessment & Plan notes found for this encounter  Diagnoses and all orders for this visit:    Cervicalgia  -     Ambulatory referral to Physical Therapy; Future  -     piroxicam (FELDENE) 20 mg capsule; Take 1 capsule (20 mg total) by mouth daily    Acute pain of left knee  -     Ambulatory referral to Physical Therapy; Future  -     piroxicam (FELDENE) 20 mg capsule; Take 1 capsule (20 mg total) by mouth daily    Meningioma [facility])          Subjective:   Chief Complaint   Patient presents with    Neck Pain     More on Right side    Leg Pain     Left Leg    Immunizations     Patient may need PCV 13-seen in [facility]          Patient ID: Sylvia Copeland is a 70 y o  male  Patient is a 67-year-old male who presents for follow-up on neck pain and left knee pain  Patient admits he has significant morning stiffness but once he loosens up and stretches and improved somewhat  No weakness numbness or tingling in his extremities  No specific precipitating trauma but notices that he has waxing and waning symptoms have gradually gotten worse with a past 4 weeks or so  No fevers chills nausea vomiting  No loss of bowel or bladder control  No other complaints at this time patient also has a meningioma and also has follow-up planned with Neurosurgery  Neck Pain    Associated symptoms include leg pain     Leg Pain          The following portions of the patient's history were reviewed and updated as appropriate: allergies, current medications, past family history, past medical history, past social history, past surgical history and problem list     Review of Systems   Constitutional: Negative  HENT: Negative  Eyes: Negative  Respiratory: Negative  Cardiovascular: Negative  Gastrointestinal: Negative  Endocrine: Negative  Genitourinary: Negative  Musculoskeletal: Positive for neck pain  Allergic/Immunologic: Negative  Neurological: Negative  Hematological: Negative  Psychiatric/Behavioral: Negative  All other systems reviewed and are negative  Objective:      /74 (BP Location: Right arm, Patient Position: Sitting, Cuff Size: Large)   Temp (!) 96 5 °F (35 8 °C) (Tympanic)   Ht 6' 1" (1 854 m)   Wt 96 2 kg (212 lb)   BMI 27 97 kg/m²          Physical Exam   Constitutional: He is oriented to person, place, and time  He appears well-developed and well-nourished  HENT:   Head: Atraumatic  Right Ear: External ear normal    Left Ear: External ear normal    Eyes: Pupils are equal, round, and reactive to light  Conjunctivae and EOM are normal    Neck: Normal range of motion  Pulmonary/Chest: Effort normal  No respiratory distress  Musculoskeletal: Normal range of motion  Decreased range of motion of the neck  Palpable crepitus of the left knee  Neurological: He is alert and oriented to person, place, and time  No cranial nerve deficit  Skin: Skin is warm and dry  Psychiatric: He has a normal mood and affect   His behavior is normal  Judgment and thought content normal

## 2018-11-06 ENCOUNTER — TRANSCRIBE ORDERS (OUTPATIENT)
Dept: NEUROSURGERY | Facility: CLINIC | Age: 63
End: 2018-11-06

## 2018-11-06 ENCOUNTER — OFFICE VISIT (OUTPATIENT)
Dept: NEUROSURGERY | Facility: CLINIC | Age: 63
End: 2018-11-06
Payer: COMMERCIAL

## 2018-11-06 VITALS
BODY MASS INDEX: 28.49 KG/M2 | DIASTOLIC BLOOD PRESSURE: 76 MMHG | WEIGHT: 215 LBS | SYSTOLIC BLOOD PRESSURE: 116 MMHG | HEART RATE: 82 BPM | HEIGHT: 73 IN | RESPIRATION RATE: 18 BRPM | TEMPERATURE: 98.3 F

## 2018-11-06 DIAGNOSIS — D32.9 MENINGIOMA (HCC): ICD-10-CM

## 2018-11-06 DIAGNOSIS — R90.89 ABNORMAL FINDING ON MRI OF BRAIN: Primary | ICD-10-CM

## 2018-11-06 DIAGNOSIS — Z01.818 PRE-PROCEDURAL EXAMINATION: Primary | ICD-10-CM

## 2018-11-06 DIAGNOSIS — D33.0 BENIGN NEOPLASM OF SUPRATENTORIAL REGION OF BRAIN (HCC): ICD-10-CM

## 2018-11-06 PROCEDURE — 99213 OFFICE O/P EST LOW 20 MIN: CPT | Performed by: NEUROLOGICAL SURGERY

## 2018-11-06 NOTE — PATIENT INSTRUCTIONS
Continue with all usual activities without restriction  Further follow-up with Neurosurgery in approximately 1 year, Dr Shannon Caballero  [facility])    MRI brain a few weeks before follow-up visit

## 2018-11-06 NOTE — LETTER
November 6, 2018     Bunny Arroyo, [address]    Patient: Louise Gray   YOB: 1955   Date of Visit: 11/6/2018       Dear Dr Travon Long:    Thank you for referring Alok Espinal to me for evaluation  Below are my notes for this consultation  If you have questions, please do not hesitate to call me  I look forward to following your patient along with you  Sincerely,        Cathie Lawrence MD        CC: No Recipients  Gurwinder Fay PA-C  11/6/2018 10:43 AM  Sign at close encounter  Assessment/Plan:    Very pleasant 40-year-old male, returns for six-month follow-up, has had updated MRI of his brain as requested 9/12/18  MRI brain 9/12/18 is carefully reviewed in detail by Dr Wendi Norman, and compared with prior study of 9/28/18, no interval change in the prior identified left temporal convexity meningioma  The study was also carefully reviewed in detail with the patient  He has some new complaints, specifically he has a trigger finger of left hand 4th digit, he also has new pain in his left knee, he also reports headaches which have occurred about 2 or 3 times since his last visit and resolved spontaneously without medication  Otherwise he offers no new complaints  He remains asymptomatic of this finding on MRI    On examination today there are no focal neurologic deficits appreciated, Romberg is intact, finger-nose is intact, there is no pronator drift, cranial nerves are all grossly intact, motor examination the upper and lower extremities were 5 x 5 for power, reflexes are intact and symmetric for the upper and lower extremities  Further follow-up with Neurosurgery is planned in approximately 1 year, with repeat MRI brain with without contrast prior to the clinical follow-up  He is advised to meet with his primary care provider to discuss the complaints listed above      These findings, impressions and recommendations are reviewed in great detail with the patient, he expressed understanding and agreement, his questions were answered completely and to his satisfaction  Follow up has been scheduled  Diagnoses and all orders for this visit:    Abnormal finding on MRI of brain  -     MRI brain with and without contrast; Future    Benign neoplasm of supratentorial region of brain [facility])  -     MRI brain with and without contrast; Future    Meningioma ([facility] [address] )  -     MRI brain with and without contrast; Future          Return in about 1 year (around 11/6/2019) for Review MRI of the brain  Subjective:      Patient ID: Roma Blanco is a 70 y o  male  HPI    The following portions of the patient's history were reviewed and updated as appropriate: allergies, current medications, past family history, past medical history, past social history and past surgical history  Review of Systems   Constitutional: Negative  HENT: Negative  Eyes: Negative  Respiratory: Negative  Cardiovascular: Negative  Gastrointestinal: Negative  Endocrine: Negative  Genitourinary: Negative  Musculoskeletal: Negative  Skin: Negative  Allergic/Immunologic: Negative  Neurological: Negative  Hematological: Negative  Psychiatric/Behavioral: Negative  Objective:    Physical Exam   Constitutional: He is oriented to person, place, and time  He appears well-developed and well-nourished  HENT:   Head: Normocephalic and atraumatic  Eyes: Pupils are equal, round, and reactive to light  EOM are normal    Neck: Normal range of motion  Cardiovascular: Normal rate, regular rhythm and normal heart sounds  Pulmonary/Chest: Effort normal and breath sounds normal    Musculoskeletal: Normal range of motion  Neurological: He is alert and oriented to person, place, and time  He has normal reflexes  He has a normal Romberg Test  Gait normal    Reflex Scores:       Bicep reflexes are 2+ on the right side and 2+ on the left side    Skin: Skin is warm and dry  Psychiatric: He has a normal mood and affect  Neurologic Exam     Mental Status   Oriented to person, place, and time  Level of consciousness: alert    Cranial Nerves     CN III, IV, VI   Pupils are equal, round, and reactive to light  Extraocular motions are normal      Motor Exam   Right arm pronator drift: absent  Left arm pronator drift: absent    Strength   Right deltoid: 5/5  Left deltoid: 5/5  Right biceps: 5/5  Left biceps: 5/5  Right quadriceps: 5/5  Left quadriceps: 5/5  Right hamstrin/5  Left hamstrin/5    Gait, Coordination, and Reflexes     Gait  Gait: normal    Coordination   Romberg: negative    Reflexes   Right biceps: 2+  Left biceps: 2+         MRI BRAIN WITH AND WITHOUT CONTRAST   18     INDICATION: D32 9: Benign neoplasm of meninges, unspecified  R93 0: Abnormal findings on diagnostic imaging of skull and head, not elsewhere classified  D33 0: Benign neoplasm of brain, supratentorial    Follow-up of meningioma      COMPARISON:  MRI brain study of 2018      TECHNIQUE:  Sagittal T1, axial T2, axial FLAIR, axial T1, axial T2*, axial diffusion  Sagittal, axial and coronal T1 postcontrast   Axial BRAVO post contrast        IV Contrast:  10 mL of gadobutrol injection (MULTI-DOSE)      IMAGE QUALITY:   Diagnostic      FINDINGS:     BRAIN PARENCHYMA:  Diffusion-weighted imaging is normal   There is appropriate parenchymal volume  There is no abnormal T2 FLAIR signal abnormality  The midline structures are unremarkable in morphology  Redemonstrated is the left temporal dural-based   mass lesion with homogeneous enhancement  The lesion measures 1 3 x 0 8 x 1 2 cm in the AP, transverse and craniocaudad dimensions  Measurements were taken on image 102 of series 11 and image 3 of series 9  Small dural tails are identified emanating   from the anterior and posterior margins of the lesion  There is no underlying cortical signal abnormality    No additional dural based enhancing lesions are identified      VENTRICLES:  Normal      SELLA AND PITUITARY GLAND:  Normal      ORBITS:  Normal      PARANASAL SINUSES:  There is mild mucosal thickening within the ethmoid air cells      VASCULATURE:  Evaluation of the major intracranial vasculature demonstrates appropriate flow voids      CALVARIUM AND SKULL BASE:  Normal      EXTRACRANIAL SOFT TISSUES:  Normal      IMPRESSION:     Stable left temporal convexity meningioma    Continued surveillance imaging as clinically indicated

## 2018-11-06 NOTE — PROGRESS NOTES
Assessment/Plan:    Very pleasant 56-year-old male, returns for six-month follow-up, has had updated MRI of his brain as requested 9/12/18  MRI brain 9/12/18 is carefully reviewed in detail by Dr Kena Cano, and compared with prior study of 9/28/18, no interval change in the prior identified left temporal convexity meningioma  The study was also carefully reviewed in detail with the patient  He has some new complaints, specifically he has a trigger finger of left hand 4th digit, he also has new pain in his left knee, he also reports headaches which have occurred about 2 or 3 times since his last visit and resolved spontaneously without medication  Otherwise he offers no new complaints  He remains asymptomatic of this finding on MRI    On examination today there are no focal neurologic deficits appreciated, Romberg is intact, finger-nose is intact, there is no pronator drift, cranial nerves are all grossly intact, motor examination the upper and lower extremities were 5 x 5 for power, reflexes are intact and symmetric for the upper and lower extremities  Further follow-up with Neurosurgery is planned in approximately 1 year, with repeat MRI brain with without contrast prior to the clinical follow-up  He is advised to meet with his primary care provider to discuss the complaints listed above  These findings, impressions and recommendations are reviewed in great detail with the patient, he expressed understanding and agreement, his questions were answered completely and to his satisfaction  Follow up has been scheduled         Diagnoses and all orders for this visit:    Abnormal finding on MRI of brain  -     MRI brain with and without contrast; Future    Benign neoplasm of supratentorial region of brain [facility])  -     MRI brain with and without contrast; Future    Meningioma ([facility] [address] )  -     MRI brain with and without contrast; Future          Return in about 1 year (around 11/6/2019) for Review MRI of the brain     Subjective:      Patient ID: Nathalia Villalobos is a 70 y o  male  HPI    The following portions of the patient's history were reviewed and updated as appropriate: allergies, current medications, past family history, past medical history, past social history and past surgical history  Review of Systems   Constitutional: Negative  HENT: Negative  Eyes: Negative  Respiratory: Negative  Cardiovascular: Negative  Gastrointestinal: Negative  Endocrine: Negative  Genitourinary: Negative  Musculoskeletal: Negative  Skin: Negative  Allergic/Immunologic: Negative  Neurological: Negative  Hematological: Negative  Psychiatric/Behavioral: Negative  Objective:    Physical Exam   Constitutional: He is oriented to person, place, and time  He appears well-developed and well-nourished  HENT:   Head: Normocephalic and atraumatic  Eyes: Pupils are equal, round, and reactive to light  EOM are normal    Neck: Normal range of motion  Cardiovascular: Normal rate, regular rhythm and normal heart sounds  Pulmonary/Chest: Effort normal and breath sounds normal    Musculoskeletal: Normal range of motion  Neurological: He is alert and oriented to person, place, and time  He has normal reflexes  He has a normal Romberg Test  Gait normal    Reflex Scores:       Bicep reflexes are 2+ on the right side and 2+ on the left side  Skin: Skin is warm and dry  Psychiatric: He has a normal mood and affect  Neurologic Exam     Mental Status   Oriented to person, place, and time  Level of consciousness: alert    Cranial Nerves     CN III, IV, VI   Pupils are equal, round, and reactive to light    Extraocular motions are normal      Motor Exam   Right arm pronator drift: absent  Left arm pronator drift: absent    Strength   Right deltoid: 5/5  Left deltoid: 5/5  Right biceps: 5/5  Left biceps: 5/5  Right quadriceps: 5/5  Left quadriceps: 5/5  Right hamstrin/5  Left hamstrin/5    Gait, Coordination, and Reflexes     Gait  Gait: normal    Coordination   Romberg: negative    Reflexes   Right biceps: 2+  Left biceps: 2+         MRI BRAIN WITH AND WITHOUT CONTRAST   18     INDICATION: D32 9: Benign neoplasm of meninges, unspecified  R93 0: Abnormal findings on diagnostic imaging of skull and head, not elsewhere classified  D33 0: Benign neoplasm of brain, supratentorial    Follow-up of meningioma      COMPARISON:  MRI brain study of 2018      TECHNIQUE:  Sagittal T1, axial T2, axial FLAIR, axial T1, axial T2*, axial diffusion  Sagittal, axial and coronal T1 postcontrast   Axial BRAVO post contrast        IV Contrast:  10 mL of gadobutrol injection (MULTI-DOSE)      IMAGE QUALITY:   Diagnostic      FINDINGS:     BRAIN PARENCHYMA:  Diffusion-weighted imaging is normal   There is appropriate parenchymal volume  There is no abnormal T2 FLAIR signal abnormality  The midline structures are unremarkable in morphology  Redemonstrated is the left temporal dural-based   mass lesion with homogeneous enhancement  The lesion measures 1 3 x 0 8 x 1 2 cm in the AP, transverse and craniocaudad dimensions  Measurements were taken on image 102 of series 11 and image 3 of series 9  Small dural tails are identified emanating   from the anterior and posterior margins of the lesion  There is no underlying cortical signal abnormality  No additional dural based enhancing lesions are identified      VENTRICLES:  Normal      SELLA AND PITUITARY GLAND:  Normal      ORBITS:  Normal      PARANASAL SINUSES:  There is mild mucosal thickening within the ethmoid air cells      VASCULATURE:  Evaluation of the major intracranial vasculature demonstrates appropriate flow voids      CALVARIUM AND SKULL BASE:  Normal      EXTRACRANIAL SOFT TISSUES:  Normal      IMPRESSION:     Stable left temporal convexity meningioma    Continued surveillance imaging as clinically indicated

## 2018-11-28 ENCOUNTER — OFFICE VISIT (OUTPATIENT)
Dept: FAMILY MEDICINE CLINIC | Facility: CLINIC | Age: 63
End: 2018-11-28
Payer: COMMERCIAL

## 2018-11-28 VITALS
SYSTOLIC BLOOD PRESSURE: 118 MMHG | DIASTOLIC BLOOD PRESSURE: 76 MMHG | HEIGHT: 73 IN | WEIGHT: 215.4 LBS | BODY MASS INDEX: 28.55 KG/M2 | TEMPERATURE: 97.4 F

## 2018-11-28 DIAGNOSIS — M79.605 PAIN IN BOTH LOWER EXTREMITIES: Primary | ICD-10-CM

## 2018-11-28 DIAGNOSIS — I83.893 VARICOSE VEINS OF BOTH LEGS WITH EDEMA: ICD-10-CM

## 2018-11-28 DIAGNOSIS — M79.604 PAIN IN BOTH LOWER EXTREMITIES: Primary | ICD-10-CM

## 2018-11-28 PROCEDURE — 99213 OFFICE O/P EST LOW 20 MIN: CPT | Performed by: FAMILY MEDICINE

## 2018-11-28 RX ORDER — SODIUM CHLORIDE 5 %
1 OINTMENT (GRAM) OPHTHALMIC (EYE) DAILY
COMMUNITY

## 2018-11-28 NOTE — PROGRESS NOTES
Assessment/Plan:  Patient see vascular surgeon due to vascular varicose veins bilateral lower extremities left side worse than right  Patient will use proxy CAM the the daily as needed       Diagnoses and all orders for this visit:    Pain in both lower extremities    Varicose veins of both legs with edema  -     Ambulatory referral to Vascular Surgery; Future    Other orders  -     sodium chloride (DIANA 128) 5 % hypertonic ophthalmic ointment; Administer 1 drop to both eyes daily          Subjective:      Patient ID: Page Young is a 70 y o  male  Patient here to follow-up on brain mass/meningioma  Patient did have follow-up MRI and this is stable  Patient will have follow-up MRI in roughly 1 year and follow up with Neurosurgery  No headaches or vertigo  Patient lives with pain in the posterior thigh and behind the knee and the lower leg the intermittently  The rare back pain  The patient with varicose veins on left leg greater than right      Leg Pain          The following portions of the patient's history were reviewed and updated as appropriate: allergies, current medications, past family history, past medical history, past social history, past surgical history and problem list     Review of Systems   Constitutional: Negative  HENT: Negative  Eyes: Negative  Respiratory: Negative  Cardiovascular: Positive for leg swelling  Gastrointestinal: Negative  Endocrine: Negative  Genitourinary: Negative  Musculoskeletal: Positive for arthralgias and back pain  Skin: Positive for color change  Allergic/Immunologic: Negative  Neurological: Negative  Hematological: Negative  Psychiatric/Behavioral: Negative            Objective:      /76 (BP Location: Right arm, Patient Position: Sitting, Cuff Size: Adult)   Temp (!) 97 4 °F (36 3 °C) (Tympanic)   Ht 6' 1" (1 854 m)   Wt 97 7 kg (215 lb 6 4 oz)   BMI 28 42 kg/m²          Physical Exam   Constitutional: He appears well-developed and well-nourished  Cardiovascular: Normal rate and regular rhythm  Pulmonary/Chest: Effort normal and breath sounds normal    Skin:   Echoes veins bilateral lower extremities left worse than right   Nursing note and vitals reviewed

## 2018-12-14 ENCOUNTER — OFFICE VISIT (OUTPATIENT)
Dept: FAMILY MEDICINE CLINIC | Facility: CLINIC | Age: 63
End: 2018-12-14
Payer: COMMERCIAL

## 2018-12-14 VITALS
SYSTOLIC BLOOD PRESSURE: 122 MMHG | HEIGHT: 73 IN | WEIGHT: 218 LBS | TEMPERATURE: 97.6 F | BODY MASS INDEX: 28.89 KG/M2 | DIASTOLIC BLOOD PRESSURE: 68 MMHG

## 2018-12-14 DIAGNOSIS — M17.12 PRIMARY OSTEOARTHRITIS OF LEFT KNEE: ICD-10-CM

## 2018-12-14 DIAGNOSIS — M48.062 SPINAL STENOSIS OF LUMBAR REGION WITH NEUROGENIC CLAUDICATION: Primary | ICD-10-CM

## 2018-12-14 DIAGNOSIS — Z23 NEED FOR PNEUMOCOCCAL VACCINATION: ICD-10-CM

## 2018-12-14 DIAGNOSIS — M54.16 LUMBAR RADICULOPATHY: ICD-10-CM

## 2018-12-14 PROCEDURE — 99214 OFFICE O/P EST MOD 30 MIN: CPT | Performed by: FAMILY MEDICINE

## 2018-12-14 NOTE — PROGRESS NOTES
Assessment/Plan:  The patient will restart back exercises and takes Feldene   Diagnoses and all orders for this visit:    Spinal stenosis of lumbar region with neurogenic claudication  -     MRI lumbar spine w contrast; Future    Need for pneumococcal vaccination  -     PNEUMOCOCCAL CONJUGATE VACCINE 13-VALENT GREATER THAN 6 MONTHS    Lumbar radiculopathy  -     MRI lumbar spine w contrast; Future    Primary osteoarthritis of left knee          Subjective:      Patient ID: Geraldo Valverde is a 70 y o  male  Patient having some low back pain with radiation of pain to bilateral legs  Patient also with left knee pain  Patient does get a discomfort in his bottom of his feet  The following portions of the patient's history were reviewed and updated as appropriate: allergies, current medications, past family history, past medical history, past social history, past surgical history and problem list     Review of Systems   Constitutional: Negative  HENT: Negative  Eyes: Negative  Respiratory: Negative  Cardiovascular: Negative  Gastrointestinal: Negative  Endocrine: Negative  Genitourinary: Negative  Musculoskeletal: Positive for arthralgias and back pain  Skin: Negative  Allergic/Immunologic: Negative  Neurological: Negative  Hematological: Negative  Psychiatric/Behavioral: Negative  Objective:      /68 (BP Location: Right arm, Patient Position: Sitting, Cuff Size: Adult)   Temp 97 6 °F (36 4 °C) (Tympanic)   Ht 6' 0 75" (1 848 m)   Wt 98 9 kg (218 lb)   BMI 28 96 kg/m²          Physical Exam   Constitutional: He is oriented to person, place, and time  He appears well-developed  Cardiovascular: Normal rate and regular rhythm  Pulmonary/Chest: Effort normal and breath sounds normal    Musculoskeletal: He exhibits tenderness  The osteoarthritis left knee   Neurological: He is alert and oriented to person, place, and time     Nursing note and vitals reviewed

## 2018-12-17 ENCOUNTER — APPOINTMENT (OUTPATIENT)
Dept: LAB | Facility: HOSPITAL | Age: 63
End: 2018-12-17
Attending: NEUROLOGICAL SURGERY
Payer: COMMERCIAL

## 2018-12-17 DIAGNOSIS — M54.50 LUMBAR PAIN: Primary | ICD-10-CM

## 2018-12-17 DIAGNOSIS — Z01.818 PRE-PROCEDURAL EXAMINATION: ICD-10-CM

## 2018-12-17 DIAGNOSIS — M54.50 LUMBAR BACK PAIN: ICD-10-CM

## 2018-12-17 LAB
BUN SERPL-MCNC: 16 MG/DL (ref 5–25)
CREAT SERPL-MCNC: 0.77 MG/DL (ref 0.6–1.3)
GFR SERPL CREATININE-BSD FRML MDRD: 95 ML/MIN/1.73SQ M

## 2018-12-17 PROCEDURE — 82565 ASSAY OF CREATININE: CPT

## 2018-12-17 PROCEDURE — 84520 ASSAY OF UREA NITROGEN: CPT

## 2018-12-17 PROCEDURE — 36415 COLL VENOUS BLD VENIPUNCTURE: CPT

## 2018-12-20 ENCOUNTER — HOSPITAL ENCOUNTER (OUTPATIENT)
Dept: MRI IMAGING | Facility: HOSPITAL | Age: 63
Discharge: HOME | End: 2018-12-20
Payer: COMMERCIAL

## 2018-12-20 DIAGNOSIS — M54.50 LUMBAR BACK PAIN: ICD-10-CM

## 2018-12-20 DIAGNOSIS — M54.50 LUMBAR PAIN: ICD-10-CM

## 2018-12-20 PROCEDURE — A9585 GADOBUTROL INJECTION: HCPCS | Performed by: FAMILY MEDICINE

## 2018-12-20 PROCEDURE — 72158 MRI LUMBAR SPINE W/O & W/DYE: CPT

## 2018-12-20 RX ADMIN — GADOBUTROL 9 ML: 604.72 INJECTION INTRAVENOUS at 07:07

## 2019-03-14 ENCOUNTER — OFFICE VISIT (OUTPATIENT)
Dept: FAMILY MEDICINE CLINIC | Facility: CLINIC | Age: 64
End: 2019-03-14
Payer: COMMERCIAL

## 2019-03-14 VITALS
HEART RATE: 87 BPM | TEMPERATURE: 96 F | HEIGHT: 73 IN | WEIGHT: 221 LBS | BODY MASS INDEX: 29.29 KG/M2 | SYSTOLIC BLOOD PRESSURE: 102 MMHG | DIASTOLIC BLOOD PRESSURE: 70 MMHG

## 2019-03-14 DIAGNOSIS — N40.0 BPH WITHOUT URINARY OBSTRUCTION: ICD-10-CM

## 2019-03-14 DIAGNOSIS — M25.562 CHRONIC PAIN OF LEFT KNEE: ICD-10-CM

## 2019-03-14 DIAGNOSIS — M54.16 LUMBAR RADICULOPATHY: Primary | ICD-10-CM

## 2019-03-14 DIAGNOSIS — E66.3 OVERWEIGHT (BMI 25.0-29.9): ICD-10-CM

## 2019-03-14 DIAGNOSIS — I83.893 VARICOSE VEINS OF BOTH LEGS WITH EDEMA: ICD-10-CM

## 2019-03-14 DIAGNOSIS — G89.29 CHRONIC PAIN OF LEFT KNEE: ICD-10-CM

## 2019-03-14 PROCEDURE — 99213 OFFICE O/P EST LOW 20 MIN: CPT | Performed by: FAMILY MEDICINE

## 2019-03-14 NOTE — PROGRESS NOTES
Assessment/Plan:  Patient will call if patient needs referral to Orthopedics or once MRI of the left knee done  Patient will be referred to Urology  Patient follow-up as needed     Diagnoses and all orders for this visit:    Lumbar radiculopathy    Varicose veins of both legs with edema    Chronic pain of left knee    BPH without urinary obstruction  -     Ambulatory referral to Urology; Future          Subjective:      Patient ID: Patti Walker is a 70 y o  male  Patient follow-up on lumbar radiculopathy any pain  Patient with ongoing knee pain  Rest of left leg pain has improved overall  The following portions of the patient's history were reviewed and updated as appropriate: allergies, current medications, past family history, past medical history, past social history, past surgical history and problem list     Review of Systems   Constitutional: Negative  HENT: Negative  Eyes: Negative  Respiratory: Negative  Cardiovascular: Negative  Gastrointestinal: Negative  Endocrine: Negative  Genitourinary: Negative  Musculoskeletal: Positive for arthralgias and gait problem  Skin: Negative  Allergic/Immunologic: Negative  Hematological: Negative  Psychiatric/Behavioral: Negative  Objective:      /70 (BP Location: Right arm, Patient Position: Sitting, Cuff Size: Adult)   Pulse 87   Temp (!) 96 °F (35 6 °C) (Tympanic)   Ht 6' 1" (1 854 m)   Wt 100 kg (221 lb)   BMI 29 16 kg/m²          Physical Exam   Constitutional: He appears well-developed and well-nourished  Cardiovascular: Normal rate and regular rhythm  Pulmonary/Chest: Effort normal and breath sounds normal    Musculoskeletal: He exhibits tenderness  BMI Counseling: Body mass index is 29 16 kg/m²  Discussed the patient's BMI with him  The BMI is above average  BMI counseling and education was provided to the patient  Nutrition recommendations include reducing portion sizes

## 2019-03-14 NOTE — PATIENT INSTRUCTIONS

## 2019-04-18 ENCOUNTER — CONSULT (OUTPATIENT)
Dept: UROLOGY | Facility: CLINIC | Age: 64
End: 2019-04-18
Payer: COMMERCIAL

## 2019-04-18 VITALS
BODY MASS INDEX: 29.29 KG/M2 | SYSTOLIC BLOOD PRESSURE: 124 MMHG | WEIGHT: 221 LBS | DIASTOLIC BLOOD PRESSURE: 86 MMHG | HEART RATE: 72 BPM | HEIGHT: 73 IN

## 2019-04-18 DIAGNOSIS — N40.0 BPH WITHOUT URINARY OBSTRUCTION: Primary | ICD-10-CM

## 2019-04-18 LAB
POST-VOID RESIDUAL VOLUME, ML POC: 30 ML
SL AMB  POCT GLUCOSE, UA: NORMAL
SL AMB LEUKOCYTE ESTERASE,UA: NORMAL
SL AMB POCT BILIRUBIN,UA: NORMAL
SL AMB POCT BLOOD,UA: NORMAL
SL AMB POCT CLARITY,UA: NORMAL
SL AMB POCT COLOR,UA: YELLOW
SL AMB POCT KETONES,UA: NORMAL
SL AMB POCT NITRITE,UA: NORMAL
SL AMB POCT PH,UA: 5
SL AMB POCT SPECIFIC GRAVITY,UA: 1.01
SL AMB POCT URINE PROTEIN: NORMAL
SL AMB POCT UROBILINOGEN: NORMAL

## 2019-04-18 PROCEDURE — 51798 US URINE CAPACITY MEASURE: CPT | Performed by: UROLOGY

## 2019-04-18 PROCEDURE — 99244 OFF/OP CNSLTJ NEW/EST MOD 40: CPT | Performed by: UROLOGY

## 2019-04-18 PROCEDURE — 81002 URINALYSIS NONAUTO W/O SCOPE: CPT | Performed by: UROLOGY

## 2019-05-08 ENCOUNTER — TELEPHONE (OUTPATIENT)
Dept: FAMILY MEDICINE CLINIC | Facility: CLINIC | Age: 64
End: 2019-05-08

## 2019-05-08 DIAGNOSIS — M79.604 PAIN IN BOTH LOWER EXTREMITIES: Primary | ICD-10-CM

## 2019-05-08 DIAGNOSIS — M79.605 PAIN IN BOTH LOWER EXTREMITIES: Primary | ICD-10-CM

## 2019-05-08 DIAGNOSIS — M25.562 LEFT KNEE PAIN, UNSPECIFIED CHRONICITY: ICD-10-CM

## 2019-05-11 ENCOUNTER — APPOINTMENT (OUTPATIENT)
Dept: LAB | Facility: MEDICAL CENTER | Age: 64
End: 2019-05-11
Payer: COMMERCIAL

## 2019-05-11 DIAGNOSIS — N40.0 BPH WITHOUT URINARY OBSTRUCTION: ICD-10-CM

## 2019-05-11 LAB — PSA SERPL-MCNC: 3.7 NG/ML (ref 0–4)

## 2019-05-11 PROCEDURE — 36415 COLL VENOUS BLD VENIPUNCTURE: CPT

## 2019-05-11 PROCEDURE — G0103 PSA SCREENING: HCPCS

## 2019-05-22 PROBLEM — Z12.5 PROSTATE CANCER SCREENING: Status: ACTIVE | Noted: 2019-05-22

## 2019-05-23 ENCOUNTER — OFFICE VISIT (OUTPATIENT)
Dept: UROLOGY | Facility: CLINIC | Age: 64
End: 2019-05-23
Payer: COMMERCIAL

## 2019-05-23 VITALS
WEIGHT: 220 LBS | HEART RATE: 64 BPM | BODY MASS INDEX: 29.16 KG/M2 | DIASTOLIC BLOOD PRESSURE: 74 MMHG | SYSTOLIC BLOOD PRESSURE: 114 MMHG | HEIGHT: 73 IN

## 2019-05-23 DIAGNOSIS — Z12.5 PROSTATE CANCER SCREENING: ICD-10-CM

## 2019-05-23 DIAGNOSIS — N40.0 BPH WITHOUT URINARY OBSTRUCTION: Primary | ICD-10-CM

## 2019-05-23 PROCEDURE — 99213 OFFICE O/P EST LOW 20 MIN: CPT | Performed by: PHYSICIAN ASSISTANT

## 2019-07-18 ENCOUNTER — HOSPITAL ENCOUNTER (OUTPATIENT)
Dept: MRI IMAGING | Facility: HOSPITAL | Age: 64
Discharge: HOME | End: 2019-07-18
Payer: COMMERCIAL

## 2019-07-18 DIAGNOSIS — M79.604 PAIN IN BOTH LOWER EXTREMITIES: ICD-10-CM

## 2019-07-18 DIAGNOSIS — M79.605 PAIN IN BOTH LOWER EXTREMITIES: ICD-10-CM

## 2019-07-18 DIAGNOSIS — M25.562 LEFT KNEE PAIN, UNSPECIFIED CHRONICITY: ICD-10-CM

## 2019-07-18 PROCEDURE — 73721 MRI JNT OF LWR EXTRE W/O DYE: CPT

## 2019-07-19 ENCOUNTER — TELEPHONE (OUTPATIENT)
Dept: FAMILY MEDICINE CLINIC | Facility: CLINIC | Age: 64
End: 2019-07-19

## 2019-07-19 DIAGNOSIS — M17.12 OSTEOARTHRITIS OF LEFT KNEE, UNSPECIFIED OSTEOARTHRITIS TYPE: Primary | ICD-10-CM

## 2019-07-19 DIAGNOSIS — S83.207A ACUTE MENISCAL TEAR OF LEFT KNEE, INITIAL ENCOUNTER: ICD-10-CM

## 2019-07-19 NOTE — TELEPHONE ENCOUNTER
----- Message from Angela Davila DO sent at 7/19/2019 12:14 PM EDT -----  Call patient  MRI of the left knee reviewed  Patient with tricompartmental degenerative arthritis which is severe worse in the lateral aspect  Patient does have severe full-thickness chondral deficiency  Patient also with meniscal tears of the medial and lateral meniscus    Suggest possible injection or seeing Orthopedics if symptoms persist

## 2019-08-01 ENCOUNTER — APPOINTMENT (OUTPATIENT)
Dept: RADIOLOGY | Facility: CLINIC | Age: 64
End: 2019-08-01
Payer: COMMERCIAL

## 2019-08-01 ENCOUNTER — OFFICE VISIT (OUTPATIENT)
Dept: OBGYN CLINIC | Facility: MEDICAL CENTER | Age: 64
End: 2019-08-01
Payer: COMMERCIAL

## 2019-08-01 VITALS
WEIGHT: 222.4 LBS | HEART RATE: 73 BPM | HEIGHT: 73 IN | BODY MASS INDEX: 29.48 KG/M2 | SYSTOLIC BLOOD PRESSURE: 105 MMHG | DIASTOLIC BLOOD PRESSURE: 68 MMHG

## 2019-08-01 DIAGNOSIS — M17.12 PRIMARY OSTEOARTHRITIS OF LEFT KNEE: ICD-10-CM

## 2019-08-01 DIAGNOSIS — M25.562 CHRONIC PAIN OF LEFT KNEE: ICD-10-CM

## 2019-08-01 DIAGNOSIS — Z01.89 ENCOUNTER FOR LOWER EXTREMITY COMPARISON IMAGING STUDY: Primary | ICD-10-CM

## 2019-08-01 DIAGNOSIS — G89.29 CHRONIC PAIN OF LEFT KNEE: ICD-10-CM

## 2019-08-01 DIAGNOSIS — Z01.89 ENCOUNTER FOR LOWER EXTREMITY COMPARISON IMAGING STUDY: ICD-10-CM

## 2019-08-01 DIAGNOSIS — M25.562 LEFT KNEE PAIN, UNSPECIFIED CHRONICITY: ICD-10-CM

## 2019-08-01 PROCEDURE — 73562 X-RAY EXAM OF KNEE 3: CPT

## 2019-08-01 PROCEDURE — 73564 X-RAY EXAM KNEE 4 OR MORE: CPT

## 2019-08-01 PROCEDURE — 99243 OFF/OP CNSLTJ NEW/EST LOW 30: CPT | Performed by: EMERGENCY MEDICINE

## 2019-08-01 NOTE — LETTER
August 1, 2019     Michelle Martinez, [address]    Patient: Mehnaz Lizama   YOB: 1955   Date of Visit: 8/1/2019       Dear Dr Miranda Valverde:    Thank you for referring Danielle Mcintosh to me for evaluation  Below are the relevant portions of my assessment and plan of care  If you have questions, please do not hesitate to call me  I look forward to following Anatoliy Valadez along with you           Sincerely,        Rebecca Zavala MD        CC: No Recipients

## 2019-08-01 NOTE — PROGRESS NOTES
Assessment/Plan:    Diagnoses and all orders for this visit:    Encounter for lower extremity comparison imaging study  -     XR knee 3 vw right non injury; Future  -     Cancel: Large joint arthrocentesis: L knee    Primary osteoarthritis of left knee  -     Ambulatory referral to Orthopedic Surgery  -     Cancel: Large joint arthrocentesis: L knee    Chronic pain of left knee  -     XR knee 4+ vw left injury; Future  -     Cancel: Large joint arthrocentesis: L knee     Patient with severe osteoarthritis most pronounced on the lateral compartment    · Chronic left knee pain  · We have discussed treatment options including lateral  brace, PT, intra-articular steroid injections and Visco injections  We have provided information for this the patient will go home today and think about what he would like to pursue  · The patient can use NSAID medication daily for pain relief, patient has piroxicam  · Patient can use tylenol for pain relief as needed   · Patient to call for further treatment preferences    Return if symptoms worsen or fail to improve  Chief Complaint:     Chief Complaint   Patient presents with    Left Knee - Pain       Subjective:   Patient ID: Gilda Diaz is a 75 y o  male  The patient referred by PCP presents for initial evaluation of left knee pain  Today he complains of left knee pain that can extend into the shin and ankle  He can have occasional left posterior thigh pain and low back pain  He can have left knee and lower extremity swelling  Prolonged weight bearing and general activity can aggravates  Rest and sitting alleviates  He has positive movie signs  He will use meloxicam with some benefit  He has history of laminectomy x2 in 1984 and 1993  Review of Systems   Constitutional: Negative for fever  Respiratory: Negative for shortness of breath  Cardiovascular: Negative for chest pain  Gastrointestinal: Negative for abdominal pain     Musculoskeletal: Positive for arthralgias  Neurological: Negative for weakness  The following portions of the patient's chart were reviewed and updated as appropriate:    Allergy:  No Known Allergies      Past Medical History:   Diagnosis Date    Knee pain     Spinal stenosis     Varicose veins of lower extremity        Past Surgical History:   Procedure Laterality Date    KNEE SURGERY      LAMINECTOMY      lumbar       Social History     Socioeconomic History    Marital status: Single     Spouse name: Not on file    Number of children: Not on file    Years of education: Not on file    Highest education level: Not on file   Occupational History    Not on file   Social Needs    Financial resource strain: Not on file    Food insecurity:     Worry: Not on file     Inability: Not on file    Transportation needs:     Medical: Not on file     Non-medical: Not on file   Tobacco Use    Smoking status: Never Smoker    Smokeless tobacco: Never Used   Substance and Sexual Activity    Alcohol use: Yes     Comment: socially    Drug use: No    Sexual activity: Not Currently   Lifestyle    Physical activity:     Days per week: Not on file     Minutes per session: Not on file    Stress: Not on file   Relationships    Social connections:     Talks on phone: Not on file     Gets together: Not on file     Attends Denominational service: Not on file     Active member of club or organization: Not on file     Attends meetings of clubs or organizations: Not on file     Relationship status: Not on file    Intimate partner violence:     Fear of current or ex partner: Not on file     Emotionally abused: Not on file     Physically abused: Not on file     Forced sexual activity: Not on file   Other Topics Concern    Not on file   Social History Narrative    Not on file       Family History   Problem Relation Age of Onset    No Known Problems Mother     Skin cancer Sister        Medications:    Current Outpatient Medications:    piroxicam (FELDENE) 20 mg capsule, Take 1 capsule (20 mg total) by mouth daily, Disp: 30 capsule, Rfl: 1    sodium chloride (DIANA 128) 5 % hypertonic ophthalmic ointment, Administer 1 drop to both eyes daily, Disp: , Rfl:     triamcinolone (KENALOG) 0 1 % cream, Apply topically Twice daily, Disp: , Rfl:     Patient Active Problem List   Diagnosis    BPH without urinary obstruction    Mild vitamin D deficiency    Vertigo    Tinnitus of left ear    Meningioma (HCC)    Cervicalgia    Left knee pain    Pain in both lower extremities    Varicose veins of both legs with edema    Lumbar radiculopathy    Spinal stenosis of lumbar region with neurogenic claudication    Primary osteoarthritis of left knee    Prostate cancer screening       Objective:  Left Knee Exam     Range of Motion   Extension: abnormal   Flexion: abnormal     Other   Erythema: absent  Sensation: normal  Swelling: mild  Effusion: effusion present    Comments:  Left > right LE edema          Observations   Left Knee   Positive for effusion  Physical Exam   Constitutional: He is oriented to person, place, and time  He appears well-developed and well-nourished  HENT:   Right Ear: External ear normal    Left Ear: External ear normal    Nose: Nose normal    Eyes: Conjunctivae are normal    Neck: Normal range of motion  Pulmonary/Chest: Effort normal    Musculoskeletal:        Left knee: He exhibits effusion  Neurological: He is alert and oriented to person, place, and time  Skin: Skin is warm and dry  Psychiatric: He has a normal mood and affect  His behavior is normal  Judgment and thought content normal          Neurologic Exam     Mental Status   Oriented to person, place, and time  Procedures    I have personally reviewed pertinent films in PACS        Scribe Attestation    I,:   Rommel Campa am acting as a scribe while in the presence of the attending physician :        I,:   Lisa Moralez MD personally performed the services described in this documentation    as scribed in my presence :

## 2019-08-01 NOTE — PATIENT INSTRUCTIONS
Arthritis   AMBULATORY CARE:   Arthritis  is a disease that causes inflammation in one or more joints  There are many types of arthritis, such as osteoarthritis, rheumatoid arthritis, and septic arthritis  Some types cause inflammation in the joints  Other types wear away the cartilage between joints  This makes the bones of the joint rub together when you move the joint  Your symptoms may be constant, or symptoms may come and go  Arthritis often gets worse over time and can cause permanent joint damage  Common signs and symptoms of arthritis:   · Pain, swelling, or stiffness in the joint    · Limited range of motion in the joint    · Warmth or redness over the joint    · Tenderness when you touch the joint    · Stiff joints in the morning that loosen with movement    · A creaking or grinding sound when you move the joint    · Fever  Seek care immediately if:   · You have a fever and severe joint pain or swelling  · You cannot move the affected joint  · You have severe joint pain you cannot tolerate  Contact your healthcare provider if:   · Your pain or swelling does not get better with treatment  · You have questions or concerns about your condition or care  Treatment  will depend on the type of arthritis you have and if it is severe  You may need any of the following:  · Acetaminophen  decreases pain and fever  It is available without a doctor's order  Ask how much to take and how often to take it  Follow directions  Acetaminophen can cause liver damage if not taken correctly  · NSAIDs , such as ibuprofen, help decrease swelling, pain, and fever  This medicine is available with or without a doctor's order  NSAIDs can cause stomach bleeding or kidney problems in certain people  If you take blood thinner medicine, always ask your healthcare provider if NSAIDs are safe for you  Always read the medicine label and follow directions  · Steroid medicine  helps reduce swelling and pain       · Surgery may be needed to repair or replace a damaged joint  Manage arthritis:   · Rest your painful joint so it can heal   Your healthcare provider may recommend crutches or a walker if the affected joint is in a leg  · Apply ice or heat to the joint  Both can help decrease swelling and pain  Ice may also help prevent tissue damage  Use an ice pack, or put crushed ice in a plastic bag  Cover it with a towel and place it on your joint for 15 to 20 minutes every hour or as directed  You can apply heat for 20 minutes every 2 hours  Heat treatment includes hot packs or heat lamps  · Elevate your joint  Elevation helps reduce swelling and pain  Raise your joint above the level of your heart as often as you can  Prop your painful joint on pillows to keep it above your heart comfortably  · Go to physical or occupational therapy as directed  A physical therapist can teach you exercises to improve flexibility and range of motion  You may also be shown non-weight-bearing exercises that are safe for your joints, such as swimming  Exercise can help keep your joints flexible and reduce pain  An occupational therapist can help you learn to do your daily activities when your joints are stiff or sore  · Maintain a healthy weight  Extra weight puts increased pressure on your joints  Ask your healthcare provider what you should weigh  If you need to lose weight, he can help you create a weight loss program  Weight loss can help reduce pain and increase your ability to do your activities  The amount of exercise you do may vary each day, depending on your symptoms  · Wear flat or low-heeled shoes  This will help decrease pain and reduce pressure on your ankle, knee, and hip joints  · Use support devices as directed  You may be given splints to wear on your hands to help your joints rest and to decrease inflammation  While you sleep, use a pillow that is firm enough to support your neck and head    Other equipment  that may help you move and prevent falls:  · Orthotic shoes or insoles  help support your feet when you walk  · Crutches, a cane, or a walker  may help decrease your risk for falling  They also decrease stress on affected joints  · Devices to prevent falls  include raised toilet seats and bathtub bars to help you get up from sitting  Handrails can be placed in areas where you need balance and support  Follow up with your healthcare provider or rheumatologist as directed:  Write down your questions so you remember to ask them during your visits  © 2017 [facility] [facility] Information is for End User's use only and may not be sold, redistributed or otherwise used for commercial purposes  All illustrations and images included in CareNotes® are the copyrighted property of [employer] , Rajiv  or Edmond Reyes  The above information is an  only  It is not intended as medical advice for individual conditions or treatments  Talk to your doctor, nurse or pharmacist before following any medical regimen to see if it is safe and effective for you

## 2019-08-26 ENCOUNTER — OFFICE VISIT (OUTPATIENT)
Dept: FAMILY MEDICINE CLINIC | Facility: CLINIC | Age: 64
End: 2019-08-26
Payer: COMMERCIAL

## 2019-08-26 VITALS
TEMPERATURE: 96.1 F | DIASTOLIC BLOOD PRESSURE: 66 MMHG | BODY MASS INDEX: 29.03 KG/M2 | SYSTOLIC BLOOD PRESSURE: 128 MMHG | WEIGHT: 219 LBS | HEIGHT: 73 IN

## 2019-08-26 DIAGNOSIS — L30.9 DERMATITIS: Primary | ICD-10-CM

## 2019-08-26 DIAGNOSIS — Z12.11 SCREENING FOR COLON CANCER: ICD-10-CM

## 2019-08-26 PROCEDURE — 99213 OFFICE O/P EST LOW 20 MIN: CPT | Performed by: FAMILY MEDICINE

## 2019-08-26 NOTE — PROGRESS NOTES
Assessment/Plan:       Diagnoses and all orders for this visit:    Dermatitis  -     nystatin-triamcinolone (MYCOLOG-II) cream; Apply topically 2 (two) times a day    Screening for colon cancer    Other orders  -     Cancel: Ambulatory referral to Gastroenterology; Future            Subjective:        Patient ID: Joelle Courser is a 75 y o  male  The patient with itching on right face and neck over the past 6 weeks  Patient also with Itching in ear  No left-sided involvement  No treatment        The following portions of the patient's history were reviewed and updated as appropriate: allergies, current medications, past family history, past medical history, past social history, past surgical history and problem list       Review of Systems   Constitutional: Negative  HENT: Negative  Eyes: Negative  Respiratory: Negative  Cardiovascular: Negative  Gastrointestinal: Negative  Endocrine: Negative  Genitourinary: Negative  Musculoskeletal: Positive for arthralgias  Skin: Positive for color change and rash  Allergic/Immunologic: Negative  Neurological: Negative  Hematological: Negative  Psychiatric/Behavioral: Negative  Objective:      BMI Counseling: Body mass index is 28 89 kg/m²  Discussed the patient's BMI with him  The BMI is above average  BMI counseling and education was provided to the patient  Nutrition recommendations include reducing portion sizes  Depression Screening Follow-up Plan: Patient's depression screening was positive with a PHQ-2 score of   Their PHQ-9 score was   Patient assessed for underlying major depression  They have no active suicidal ideations  Brief counseling provided and recommend additional follow-up/re-evaluation next office visit        /66 (BP Location: Right arm, Patient Position: Sitting, Cuff Size: Adult)   Temp (!) 96 1 °F (35 6 °C) (Tympanic)   Ht 6' 1" (1 854 m)   Wt 99 3 kg (219 lb)   BMI 28 89 kg/m² Physical Exam   Constitutional: He appears well-developed and well-nourished  HENT:   Head: Normocephalic and atraumatic  Right Ear: External ear normal    Left Ear: External ear normal    Mouth/Throat: Oropharynx is clear and moist  No oropharyngeal exudate  Lymphadenopathy:     He has no cervical adenopathy  Skin:   Dry skin right earlobe the and neck   Nursing note and vitals reviewed

## 2019-10-08 ENCOUNTER — HOSPITAL ENCOUNTER (OUTPATIENT)
Dept: MRI IMAGING | Facility: HOSPITAL | Age: 64
Discharge: HOME | End: 2019-10-08
Payer: COMMERCIAL

## 2019-10-08 DIAGNOSIS — R90.89 ABNORMAL FINDING ON MRI OF BRAIN: ICD-10-CM

## 2019-10-08 DIAGNOSIS — D32.9 MENINGIOMA (HCC): ICD-10-CM

## 2019-10-08 DIAGNOSIS — D33.0 BENIGN NEOPLASM OF SUPRATENTORIAL REGION OF BRAIN (HCC): ICD-10-CM

## 2019-10-08 PROCEDURE — 70553 MRI BRAIN STEM W/O & W/DYE: CPT

## 2019-10-08 PROCEDURE — A9585 GADOBUTROL INJECTION: HCPCS | Performed by: PHYSICIAN ASSISTANT

## 2019-10-08 RX ADMIN — GADOBUTROL 10 ML: 604.72 INJECTION INTRAVENOUS at 09:37

## 2019-10-15 ENCOUNTER — OFFICE VISIT (OUTPATIENT)
Dept: NEUROSURGERY | Facility: CLINIC | Age: 64
End: 2019-10-15
Payer: COMMERCIAL

## 2019-10-15 VITALS
DIASTOLIC BLOOD PRESSURE: 80 MMHG | WEIGHT: 217.6 LBS | BODY MASS INDEX: 28.84 KG/M2 | HEART RATE: 75 BPM | HEIGHT: 73 IN | TEMPERATURE: 98 F | SYSTOLIC BLOOD PRESSURE: 122 MMHG

## 2019-10-15 DIAGNOSIS — D32.9 MENINGIOMA (HCC): Primary | ICD-10-CM

## 2019-10-15 PROCEDURE — 99213 OFFICE O/P EST LOW 20 MIN: CPT | Performed by: PHYSICIAN ASSISTANT

## 2019-10-15 NOTE — PATIENT INSTRUCTIONS
· Follow up in 2 years with repeat Mri of brain  · Should you have any new or worsening intractable headache, new weakness, numbness, tingling or change in mentals status, base on severity go to the nearest emergency room for evaluation and or contact our office  · Please contact us with any questions or concerns

## 2019-10-15 NOTE — PROGRESS NOTES
Neurosurgery Office Note  Leidy Love 75 y o  male MRN: [id number]      Assessment/Plan      Diagnoses and all orders for this visit:    Meningioma [facility])  -     MRI brain with and without contrast; Future        · Exam: GCS 15, AAO X 3, JETER, strength 5/5 throughout, sensation intact to LT/PP X 4, Reflexes 2+ and symmetric, no das's or clonus, no drift bilaterally  · Imaging reviewed personally and by attending  Final results reviewed with pt in great detail  · Mri Brain w wo 10/8/19: compared to MRI from 9/12/18 and from 02/08/19  Stable size of left lateral temporal region extra-axial mass compatible with meningioma measuring approximately 1 5 x 1 0 cm  No adjacent parenchymal edema or significant mass effect  · The brain lesion that is compatible with meningioma in appearance has been stable since 2018  At this time, recommend that pt follow up with neurosurgery in 2 years with repeat Mri brain w wo that has been ordered  Pt has an apt scheduled  · Discussed plan of care with patient who was agreeable to the plan  · Patient made aware to contact neurosurgery with any questions or concerns  CHIEF COMPLAINT    Chief Complaint   Patient presents with    Follow-up     Benign neoplasm of supratentorial region of brain        HISTORY      HPI    Pt is a 74 yo male who presents for a 1 year follow up for brain lesion concerning for a Menigioma  Pt reports he has vertigo that happened last Wednesday during his MRI of brain scan and the day after his MRI  Pt also reported nausea last week after MRI  Pt also reports he has itching in the right ear and the right side of his neck and jaw that has been going on for 3 months  Pt reports he does not get HA often but had some HA last week  Pt denies any lightheartedness and dizziness  Pt denies any visual disturbance or blurry vision  Pt denies any new numbness, tingling or weakness in bilateral arms or legs  Pt denies any gait instability   He denies any difficulties with his activities of daily living and reports he ambulates independently without assistive devices such as a cane or walker  Pt denies significant changes in memory or confusion  He denies any seizures  Pt reports his sister had a brain lesion and had 2 surgeries for it  REVIEW OF SYSTEMS    Review of Systems   Musculoskeletal: Positive for arthralgias (knee pain )  Negative for back pain, gait problem, joint swelling, myalgias, neck pain and neck stiffness  Neurological: Positive for headaches  Negative for dizziness, seizures, weakness and light-headedness  All other systems reviewed and are negative  Meds/Allergies     Current Outpatient Medications   Medication Sig Dispense Refill    nystatin-triamcinolone (MYCOLOG-II) cream Apply topically 2 (two) times a day 30 g 2    piroxicam (FELDENE) 20 mg capsule Take 1 capsule (20 mg total) by mouth daily 30 capsule 1    sodium chloride (DIANA 128) 5 % hypertonic ophthalmic ointment Administer 1 drop to both eyes daily      triamcinolone (KENALOG) 0 1 % cream Apply topically Twice daily       No current facility-administered medications for this visit  No Known Allergies    PAST HISTORY    Past Medical History:   Diagnosis Date    Knee pain     Spinal stenosis     Varicose veins of lower extremity        Past Surgical History:   Procedure Laterality Date    KNEE SURGERY      LAMINECTOMY      lumbar       Social History     Tobacco Use    Smoking status: Never Smoker    Smokeless tobacco: Never Used   Substance Use Topics    Alcohol use: Yes     Comment: socially    Drug use: No       Family History   Problem Relation Age of Onset    No Known Problems Mother     Skin cancer Sister          Above history personally reviewed  EXAM    Vitals: There were no vitals taken for this visit  ,There is no height or weight on file to calculate BMI       Physical Exam   Constitutional: He is oriented to person, place, and time  He appears well-developed and well-nourished  HENT:   Head: Normocephalic and atraumatic  Eyes: Pupils are equal, round, and reactive to light  Conjunctivae and EOM are normal  No scleral icterus  Neck: Normal range of motion  Neck supple  No tracheal deviation present  Cardiovascular: Normal rate  Pulmonary/Chest: Effort normal    Abdominal: Soft  There is no tenderness  There is no guarding  Neurological: He is alert and oriented to person, place, and time  GCS 15, AAO X 3, JETER, strength 5/5 throughout, sensation intact to LT/PP X 4, Reflexes 2+ and symmetric, no das's or clonus, no drift bilaterally  Skin: Skin is warm and dry  No rash noted  No pallor  Psychiatric: He has a normal mood and affect  His behavior is normal        Neurologic Exam     Mental Status   Oriented to person, place, and time  Cranial Nerves     CN III, IV, VI   Pupils are equal, round, and reactive to light  Extraocular motions are normal          MEDICAL DECISION MAKING    Imaging Studies:     Mri Brain With And Without Contrast    Result Date: 10/9/2019  Narrative: MRI BRAIN WITH AND WITHOUT CONTRAST INDICATION: R90 89: Other abnormal findings on diagnostic imaging of central nervous system D33 0: Benign neoplasm of brain, supratentorial D32 9: Benign neoplasm of meninges, unspecified  COMPARISON:  Prior MRI February 8, 2018 TECHNIQUE: Sagittal T1, axial T2, axial FLAIR, axial T1, axial Gradient, axial diffusion  Sagittal, axial T1 postcontrast   Axial bravo postcontrast with coronal reconstructions  IV Contrast:  10 mL of Gadobutrol injection (SINGLE-DOSE)  IMAGE QUALITY:   Diagnostic  FINDINGS: BRAIN PARENCHYMA:  There is no discrete mass, mass effect or midline shift  There is no intracranial hemorrhage  Normal posterior fossa  Diffusion imaging is unremarkable  There are no white matter changes in the cerebral hemispheres   Briskly enhancing extra-axial mass left lateral temporal region stable in size from prior study measuring approximately 1 5 x 1 0 cm  No associated parenchymal edema  No additional masses are seen  VENTRICLES:  Normal  SELLA AND PITUITARY GLAND:  Normal  ORBITS:  Normal  PARANASAL SINUSES:  Normal  VASCULATURE:  Evaluation of the major intracranial vasculature demonstrates appropriate flow voids  CALVARIUM AND SKULL BASE:  Normal  EXTRACRANIAL SOFT TISSUES:  Normal      Impression: Stable size of left lateral temporal region extra-axial mass compatible with meningioma  No adjacent parenchymal edema or significant mass effect  Workstation performed: [id number]       I have personally reviewed pertinent reports     and I have personally reviewed pertinent films in PACS

## 2019-10-17 ENCOUNTER — OFFICE VISIT (OUTPATIENT)
Dept: OBGYN CLINIC | Facility: MEDICAL CENTER | Age: 64
End: 2019-10-17
Payer: COMMERCIAL

## 2019-10-17 VITALS
HEART RATE: 96 BPM | BODY MASS INDEX: 28.81 KG/M2 | DIASTOLIC BLOOD PRESSURE: 74 MMHG | HEIGHT: 73 IN | SYSTOLIC BLOOD PRESSURE: 105 MMHG | WEIGHT: 217.4 LBS

## 2019-10-17 DIAGNOSIS — G89.29 CHRONIC PAIN OF LEFT KNEE: ICD-10-CM

## 2019-10-17 DIAGNOSIS — M17.12 PRIMARY OSTEOARTHRITIS OF LEFT KNEE: Primary | ICD-10-CM

## 2019-10-17 DIAGNOSIS — M25.562 CHRONIC PAIN OF LEFT KNEE: ICD-10-CM

## 2019-10-17 PROCEDURE — 99213 OFFICE O/P EST LOW 20 MIN: CPT | Performed by: EMERGENCY MEDICINE

## 2019-10-17 NOTE — PATIENT INSTRUCTIONS
Arthritis   AMBULATORY CARE:   Arthritis  is a disease that causes inflammation in one or more joints  There are many types of arthritis, such as osteoarthritis, rheumatoid arthritis, and septic arthritis  Some types cause inflammation in the joints  Other types wear away the cartilage between joints  This makes the bones of the joint rub together when you move the joint  Your symptoms may be constant, or symptoms may come and go  Arthritis often gets worse over time and can cause permanent joint damage  Common signs and symptoms of arthritis:   · Pain, swelling, or stiffness in the joint    · Limited range of motion in the joint    · Warmth or redness over the joint    · Tenderness when you touch the joint    · Stiff joints in the morning that loosen with movement    · A creaking or grinding sound when you move the joint    · Fever  Seek care immediately if:   · You have a fever and severe joint pain or swelling  · You cannot move the affected joint  · You have severe joint pain you cannot tolerate  Contact your healthcare provider if:   · Your pain or swelling does not get better with treatment  · You have questions or concerns about your condition or care  Treatment  will depend on the type of arthritis you have and if it is severe  You may need any of the following:  · Acetaminophen  decreases pain and fever  It is available without a doctor's order  Ask how much to take and how often to take it  Follow directions  Acetaminophen can cause liver damage if not taken correctly  · NSAIDs , such as ibuprofen, help decrease swelling, pain, and fever  This medicine is available with or without a doctor's order  NSAIDs can cause stomach bleeding or kidney problems in certain people  If you take blood thinner medicine, always ask your healthcare provider if NSAIDs are safe for you  Always read the medicine label and follow directions  · Steroid medicine  helps reduce swelling and pain       · Surgery may be needed to repair or replace a damaged joint  Manage arthritis:   · Rest your painful joint so it can heal   Your healthcare provider may recommend crutches or a walker if the affected joint is in a leg  · Apply ice or heat to the joint  Both can help decrease swelling and pain  Ice may also help prevent tissue damage  Use an ice pack, or put crushed ice in a plastic bag  Cover it with a towel and place it on your joint for 15 to 20 minutes every hour or as directed  You can apply heat for 20 minutes every 2 hours  Heat treatment includes hot packs or heat lamps  · Elevate your joint  Elevation helps reduce swelling and pain  Raise your joint above the level of your heart as often as you can  Prop your painful joint on pillows to keep it above your heart comfortably  · Go to physical or occupational therapy as directed  A physical therapist can teach you exercises to improve flexibility and range of motion  You may also be shown non-weight-bearing exercises that are safe for your joints, such as swimming  Exercise can help keep your joints flexible and reduce pain  An occupational therapist can help you learn to do your daily activities when your joints are stiff or sore  · Maintain a healthy weight  Extra weight puts increased pressure on your joints  Ask your healthcare provider what you should weigh  If you need to lose weight, he can help you create a weight loss program  Weight loss can help reduce pain and increase your ability to do your activities  The amount of exercise you do may vary each day, depending on your symptoms  · Wear flat or low-heeled shoes  This will help decrease pain and reduce pressure on your ankle, knee, and hip joints  · Use support devices as directed  You may be given splints to wear on your hands to help your joints rest and to decrease inflammation  While you sleep, use a pillow that is firm enough to support your neck and head    Other equipment  that may help you move and prevent falls:  · Orthotic shoes or insoles  help support your feet when you walk  · Crutches, a cane, or a walker  may help decrease your risk for falling  They also decrease stress on affected joints  · Devices to prevent falls  include raised toilet seats and bathtub bars to help you get up from sitting  Handrails can be placed in areas where you need balance and support  Follow up with your healthcare provider or rheumatologist as directed:  Write down your questions so you remember to ask them during your visits  © 2017 [facility] Information is for End User's use only and may not be sold, redistributed or otherwise used for commercial purposes  All illustrations and images included in CareNotes® are the copyrighted property of A [employer]ACHEL A [employer] , Rajiv  or Edmond Reyes  The above information is an  only  It is not intended as medical advice for individual conditions or treatments  Talk to your doctor, nurse or pharmacist before following any medical regimen to see if it is safe and effective for you

## 2019-10-17 NOTE — PROGRESS NOTES
Assessment/Plan:    Diagnoses and all orders for this visit:    Primary osteoarthritis of left knee  -     Ambulatory referral to Orthopedic Surgery; Future    Chronic pain of left knee  -     Ambulatory referral to Orthopedic Surgery; Future    Patient referred to Dr Michael Faria for severe Left lateral knee OA and pain  He would like to take his piroxicam more frequently and declines injections at this time  Return if symptoms worsen or fail to improve  Chief Complaint:     Chief Complaint   Patient presents with    Left Knee - Follow-up       Subjective:   Patient ID: Pearl Stinson is a 75 y o  male  Patient returns for left knee pain  He notes improved symptoms in the morning and taking sitting breaks, and worsening symptoms towards end of day and increased standing / walking  Patient drives bus during the week, and finds 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 on the weekends when he is more active  Patient taking Piroxicam PRN  Review of Systems    The following portions of the patient's chart were reviewed and updated as appropriate:    Allergy:  No Known Allergies      Past Medical History:   Diagnosis Date    Knee pain     Spinal stenosis     Varicose veins of lower extremity        Past Surgical History:   Procedure Laterality Date    KNEE SURGERY      LAMINECTOMY      lumbar       Social History     Socioeconomic History    Marital status: Single     Spouse name: Not on file    Number of children: Not on file    Years of education: Not on file    Highest education level: Not on file   Occupational History    Not on file   Social Needs    Financial resource strain: Not on file    Food insecurity:     Worry: Not on file     Inability: Not on file    Transportation needs:     Medical: Not on file     Non-medical: Not on file   Tobacco Use    Smoking status: Never Smoker    Smokeless tobacco: Never Used   Substance and Sexual Activity    Alcohol use: Yes     Comment: socially    Drug use: No    Sexual activity: Not Currently   Lifestyle    Physical activity:     Days per week: Not on file     Minutes per session: Not on file    Stress: Not on file   Relationships    Social connections:     Talks on phone: Not on file     Gets together: Not on file     Attends Jew service: Not on file     Active member of club or organization: Not on file     Attends meetings of clubs or organizations: Not on file     Relationship status: Not on file    Intimate partner violence:     Fear of current or ex partner: Not on file     Emotionally abused: Not on file     Physically abused: Not on file     Forced sexual activity: Not on file   Other Topics Concern    Not on file   Social History Narrative    Not on file       Family History   Problem Relation Age of Onset    No Known Problems Mother     Skin cancer Sister        Medications:    Current Outpatient Medications:     piroxicam (FELDENE) 20 mg capsule, Take 1 capsule (20 mg total) by mouth daily (Patient taking differently: Take 20 mg by mouth as needed ), Disp: 30 capsule, Rfl: 1    nystatin-triamcinolone (MYCOLOG-II) cream, Apply topically 2 (two) times a day (Patient not taking: Reported on 10/17/2019), Disp: 30 g, Rfl: 2    sodium chloride (DIANA 128) 5 % hypertonic ophthalmic ointment, Administer 1 drop to both eyes daily, Disp: , Rfl:     triamcinolone (KENALOG) 0 1 % cream, Apply topically Twice daily, Disp: , Rfl:     Patient Active Problem List   Diagnosis    BPH without urinary obstruction    Mild vitamin D deficiency    Vertigo    Tinnitus of left ear    Meningioma ([address] )    Cervicalgia    Left knee pain    Pain in both lower extremities    Varicose veins of both legs with edema    Lumbar radiculopathy    Spinal stenosis of lumbar region with neurogenic claudication    Primary osteoarthritis of left knee    Prostate cancer screening    Dermatitis       Objective:  /74   Pulse 96   Ht 6' 1" (1 854 m)   Wt 98 6 kg (217 lb 6 4 oz)   BMI 28 68 kg/m²     Ortho Exam    Physical Exam   Constitutional: He is oriented to person, place, and time  He appears well-developed and well-nourished  HENT:   Head: Normocephalic and atraumatic  Eyes: Conjunctivae are normal    Neck: Neck supple  Pulmonary/Chest: Effort normal    Neurological: He is alert and oriented to person, place, and time  Skin: Skin is warm and dry  Psychiatric: He has a normal mood and affect  His behavior is normal    Vitals reviewed  Neurologic Exam     Mental Status   Oriented to person, place, and time  Procedures    I have personally reviewed pertinent films in PACS  and I have personally reviewed the written report of the pertinent studies

## 2019-10-18 DIAGNOSIS — M25.562 ACUTE PAIN OF LEFT KNEE: ICD-10-CM

## 2019-10-18 DIAGNOSIS — M54.2 CERVICALGIA: ICD-10-CM

## 2019-10-31 ENCOUNTER — OFFICE VISIT (OUTPATIENT)
Dept: OBGYN CLINIC | Facility: MEDICAL CENTER | Age: 64
End: 2019-10-31
Payer: COMMERCIAL

## 2019-10-31 VITALS
BODY MASS INDEX: 29.03 KG/M2 | HEIGHT: 73 IN | HEART RATE: 79 BPM | WEIGHT: 219 LBS | SYSTOLIC BLOOD PRESSURE: 124 MMHG | DIASTOLIC BLOOD PRESSURE: 80 MMHG

## 2019-10-31 DIAGNOSIS — G89.29 CHRONIC PAIN OF LEFT KNEE: ICD-10-CM

## 2019-10-31 DIAGNOSIS — M17.12 PRIMARY OSTEOARTHRITIS OF LEFT KNEE: ICD-10-CM

## 2019-10-31 DIAGNOSIS — M25.562 CHRONIC PAIN OF LEFT KNEE: ICD-10-CM

## 2019-10-31 PROCEDURE — 99213 OFFICE O/P EST LOW 20 MIN: CPT | Performed by: ORTHOPAEDIC SURGERY

## 2019-10-31 NOTE — PROGRESS NOTES
Assessment/Plan     1  Primary osteoarthritis of left knee    2  Chronic pain of left knee      Orders Placed This Encounter   Procedures    Ambulatory referral to Physical Therapy     · Discussed patient conservative treatments: CSI, visco supplementation injection, medications, physical therapy, maintaining a healthy weight and bracing   · Patient is a surgical candidate for Left TKA but would like to proceed with conservative treatments   · Physical therapy left knee   · Provided patient home exercises to take to PT   · Patient declined CSI in the office today  · Patient will call if he would like visco supplementation injection   · Declined medications  · Continue taking Feldene (  Piroxicam 20 mg) prn for pain   Return if symptoms worsen or fail to improve  I answered all of the patient's questions during the visit and provided education of the patient's condition during the visit  The patient verbalized understanding of the information given and agrees with the plan  This note was dictated using LiveRe software  It may contain errors including improperly dictated words  Please contact physician directly for any questions  More than half the visit was spent answering questions and discussing a treatment plan  History of Present Illness   Chief complaint:   Chief Complaint   Patient presents with    Left Knee - Pain       HPI: Ángel Leal is a 75 y o  male that c/o left knee pain  Patient states he had left knee arthroscopic surgery for cartilage removal 30 years ago  Patient states he has been having pain off and on the last few years  He states he is having constant soreness over anterolateral and behind the kneecap  Left knee  Denies any instability or locking  Pain is worse with standing and walking  Pain is better with rest, ice , elevation and taking Piroxicam 20 mg prn for pain  Patient has not had any CSI, Visco supplementation or physical therapy left knee        ROS:    See HPI for musculoskeletal review  All other systems reviewed are negative     Historical Information    Past Medical History:   Diagnosis Date    Knee pain     Spinal stenosis     Varicose veins of lower extremity      Past Surgical History:   Procedure Laterality Date    KNEE SURGERY      LAMINECTOMY      lumbar     Social History   Social History     Substance and Sexual Activity   Alcohol Use Yes    Comment: socially     Social History     Substance and Sexual Activity   Drug Use No     Social History     Tobacco Use   Smoking Status Never Smoker   Smokeless Tobacco Never Used     Family History:   Family History   Problem Relation Age of Onset    No Known Problems Mother     Skin cancer Sister        Current Outpatient Medications on File Prior to Visit   Medication Sig Dispense Refill    nystatin-triamcinolone (MYCOLOG-II) cream Apply topically 2 (two) times a day (Patient not taking: Reported on 10/17/2019) 30 g 2    piroxicam (FELDENE) 20 mg capsule Take 1 capsule (20 mg total) by mouth daily 30 capsule 1    sodium chloride (DIANA 128) 5 % hypertonic ophthalmic ointment Administer 1 drop to both eyes daily      triamcinolone (KENALOG) 0 1 % cream Apply topically Twice daily       No current facility-administered medications on file prior to visit  No Known Allergies    Current Outpatient Medications on File Prior to Visit   Medication Sig Dispense Refill    nystatin-triamcinolone (MYCOLOG-II) cream Apply topically 2 (two) times a day (Patient not taking: Reported on 10/17/2019) 30 g 2    piroxicam (FELDENE) 20 mg capsule Take 1 capsule (20 mg total) by mouth daily 30 capsule 1    sodium chloride (DIANA 128) 5 % hypertonic ophthalmic ointment Administer 1 drop to both eyes daily      triamcinolone (KENALOG) 0 1 % cream Apply topically Twice daily       No current facility-administered medications on file prior to visit          Objective   Vitals: Blood pressure 124/80, pulse 79, height 6' 1" (1 854 m), weight 99 3 kg (219 lb)  ,Body mass index is 28 89 kg/m²      PE:  AAOx 3  WDWN  Hearing intact, no drainage from eyes  Regular rate  no audible wheezing  no abdominal distension  LE compartments soft, skin intact     leftknee:    Appearance:  Mild generalized  swelling   No ecchymosis  no obvious joint deformity   No effusion  Varicose  veins over anterior knee   Palpation/Tenderness:  No TTP over medial joint line  No TTP over lateral joint line   No TTP over patella  No TTP over patellar tendon  No TTP over pes anserine bursa  Active Range of Motion:  AROM: / Passive ROM 5-120   Special Tests:  Valgus Stress Test:  negative  Varus Stress Test:  negative     No ipsilateral hip pain with ROM    leftLE:    Sensation grossly intact L4, S1   Palpable posterior tibial pulse  AT/GS/EHL intact    Imaging Studies: I have personally reviewed pertinent films in PACS  leftknee:  Severe DJD       Scribe Attestation    I,:   Johnathan Cline am acting as a scribe while in the presence of the attending physician :        I,:   Randall Loera DO personally performed the services described in this documentation    as scribed in my presence :

## 2019-11-06 ENCOUNTER — OFFICE VISIT (OUTPATIENT)
Dept: FAMILY MEDICINE CLINIC | Facility: CLINIC | Age: 64
End: 2019-11-06
Payer: COMMERCIAL

## 2019-11-06 VITALS
SYSTOLIC BLOOD PRESSURE: 130 MMHG | TEMPERATURE: 96.4 F | DIASTOLIC BLOOD PRESSURE: 78 MMHG | WEIGHT: 216 LBS | HEIGHT: 73 IN | BODY MASS INDEX: 28.63 KG/M2

## 2019-11-06 DIAGNOSIS — M54.16 LUMBAR RADICULOPATHY: Primary | ICD-10-CM

## 2019-11-06 PROCEDURE — 99213 OFFICE O/P EST LOW 20 MIN: CPT | Performed by: FAMILY MEDICINE

## 2019-11-06 RX ORDER — METHYLPREDNISOLONE 4 MG/1
TABLET ORAL
Qty: 21 EACH | Refills: 0 | Status: SHIPPED | OUTPATIENT
Start: 2019-11-06 | End: 2020-11-04

## 2019-11-06 RX ORDER — CYCLOBENZAPRINE HCL 10 MG
10 TABLET ORAL
Qty: 30 TABLET | Refills: 0 | Status: SHIPPED | OUTPATIENT
Start: 2019-11-06 | End: 2020-11-04

## 2019-11-06 NOTE — PROGRESS NOTES
Assessment/Plan:  The the the patient will continue with proxy CAM daily with food, patient will start Flexeril at night  Diagnoses and all orders for this visit:    Lumbar radiculopathy  -     cyclobenzaprine (FLEXERIL) 10 mg tablet; Take 1 tablet (10 mg total) by mouth daily at bedtime  -     methylPREDNISolone 4 MG tablet therapy pack; Use as directed on package            Subjective:        Patient ID: Kailee Saxena is a 75 y o  male  Patient is here with low back pain over the past few days  No trauma  The patient was using proxy CAM the but not recently  The patient still with some ongoing pain left leg but now with some in the right leg  The patient is doing some exercises for back intermittently  No significant pain with urination or defecation  The following portions of the patient's history were reviewed and updated as appropriate: allergies, current medications, past family history, past medical history, past social history, past surgical history and problem list       Review of Systems   Constitutional: Negative  HENT: Negative  Eyes: Negative  Respiratory: Negative  Cardiovascular: Negative  Gastrointestinal: Negative  Endocrine: Negative  Genitourinary: Negative  Musculoskeletal: Positive for back pain  Skin: Negative  Allergic/Immunologic: Negative  Neurological: Negative  Hematological: Negative  Psychiatric/Behavioral: Negative  Objective:      BMI Counseling: Body mass index is 28 5 kg/m²  Discussed the patient's BMI with him  The BMI is above normal  Nutrition recommendations include reducing portion sizes  Depression Screening Follow-up Plan: Patient's depression screening was positive with a PHQ-2 score of 0  Their PHQ-9 score was   Clinically patient does not have depression  No treatment is required        /78 (BP Location: Right arm, Patient Position: Sitting, Cuff Size: Large)   Temp (!) 96 4 °F (35 8 °C) (Tympanic)   Ht 6' 1" (1 854 m)   Wt 98 kg (216 lb)   BMI 28 50 kg/m²          Physical Exam   Constitutional: He appears well-developed and well-nourished  No distress  HENT:   Head: Normocephalic  Right Ear: External ear normal    Left Ear: External ear normal    Mouth/Throat: Oropharynx is clear and moist  No oropharyngeal exudate  Eyes: Pupils are equal, round, and reactive to light  EOM are normal  Right eye exhibits no discharge  Left eye exhibits no discharge  No scleral icterus  Neck: Normal range of motion  Neck supple  No thyromegaly present  Cardiovascular: Normal rate, regular rhythm, normal heart sounds and intact distal pulses  Exam reveals no gallop and no friction rub  No murmur heard  Pulmonary/Chest: Effort normal and breath sounds normal  No respiratory distress  He has no wheezes  He has no rales  He exhibits no tenderness  Musculoskeletal: Normal range of motion  He exhibits tenderness  He exhibits no edema  Paravertebral muscle spasm in the lower lumbar region  Lymphadenopathy:     He has no cervical adenopathy  Neurological: He is alert  No cranial nerve deficit  He exhibits normal muscle tone  Coordination normal    Skin: Skin is warm and dry  No rash noted  He is not diaphoretic  No erythema  No pallor  Psychiatric: He has a normal mood and affect  His behavior is normal  Judgment and thought content normal    Nursing note and vitals reviewed

## 2020-03-24 DIAGNOSIS — M25.562 ACUTE PAIN OF LEFT KNEE: ICD-10-CM

## 2020-03-24 DIAGNOSIS — M54.2 CERVICALGIA: ICD-10-CM

## 2020-05-05 ENCOUNTER — TELEPHONE (OUTPATIENT)
Dept: UROLOGY | Facility: CLINIC | Age: 65
End: 2020-05-05

## 2020-05-06 ENCOUNTER — TELEPHONE (OUTPATIENT)
Dept: UROLOGY | Facility: CLINIC | Age: 65
End: 2020-05-06

## 2020-05-27 ENCOUNTER — APPOINTMENT (OUTPATIENT)
Dept: LAB | Facility: MEDICAL CENTER | Age: 65
End: 2020-05-27
Payer: COMMERCIAL

## 2020-05-27 ENCOUNTER — TELEPHONE (OUTPATIENT)
Dept: UROLOGY | Facility: MEDICAL CENTER | Age: 65
End: 2020-05-27

## 2020-05-27 DIAGNOSIS — Z12.5 PROSTATE CANCER SCREENING: Primary | ICD-10-CM

## 2020-05-27 DIAGNOSIS — Z12.5 PROSTATE CANCER SCREENING: ICD-10-CM

## 2020-05-27 LAB — PSA SERPL-MCNC: 3.8 NG/ML (ref 0–4)

## 2020-05-27 PROCEDURE — 36415 COLL VENOUS BLD VENIPUNCTURE: CPT

## 2020-05-27 PROCEDURE — G0103 PSA SCREENING: HCPCS

## 2020-05-28 ENCOUNTER — TELEMEDICINE (OUTPATIENT)
Dept: UROLOGY | Facility: CLINIC | Age: 65
End: 2020-05-28
Payer: COMMERCIAL

## 2020-05-28 DIAGNOSIS — N40.0 BPH WITHOUT URINARY OBSTRUCTION: Primary | ICD-10-CM

## 2020-05-28 DIAGNOSIS — Z12.5 PROSTATE CANCER SCREENING: ICD-10-CM

## 2020-05-28 PROCEDURE — 99214 OFFICE O/P EST MOD 30 MIN: CPT | Performed by: PHYSICIAN ASSISTANT

## 2020-05-28 RX ORDER — TAMSULOSIN HYDROCHLORIDE 0.4 MG/1
0.4 CAPSULE ORAL
Qty: 30 CAPSULE | Refills: 3 | Status: SHIPPED | OUTPATIENT
Start: 2020-05-28 | End: 2020-11-04

## 2020-07-02 ENCOUNTER — TELEPHONE (OUTPATIENT)
Dept: UROLOGY | Facility: MEDICAL CENTER | Age: 65
End: 2020-07-02

## 2020-07-02 NOTE — TELEPHONE ENCOUNTER
This is a patient of Dr Beatris Rodríguez and seen by Jacqueline Osei in [facility]  Patient called and said he is going to start taking his tamsulosin tonight for the first time  He received the medicine last month and wanted to wait  If he has any problems from the medicine he will call us

## 2020-07-02 NOTE — TELEPHONE ENCOUNTER
Patient managed by Dr Mai Rodriguez, seen in the [address] [place] [address] [address] office  Patient last seen as telemedicine visit on 5/28/20 by Aarti Holloway PA-C  Patient with history of BPH, recent AUA score was 21  At time of last visit patient was prescribed Flomax and instructed to follow up in 3 months to reassess symptoms  Currently scheduled for 9/1/20

## 2020-08-11 DIAGNOSIS — Z01.812 PRE-PROCEDURAL LABORATORY EXAMINATIONS: ICD-10-CM

## 2020-08-11 PROCEDURE — U0003 INFECTIOUS AGENT DETECTION BY NUCLEIC ACID (DNA OR RNA); SEVERE ACUTE RESPIRATORY SYNDROME CORONAVIRUS 2 (SARS-COV-2) (CORONAVIRUS DISEASE [COVID-19]), AMPLIFIED PROBE TECHNIQUE, MAKING USE OF HIGH THROUGHPUT TECHNOLOGIES AS DESCRIBED BY CMS-2020-01-R: HCPCS | Performed by: COLON & RECTAL SURGERY

## 2020-08-12 LAB — SARS-COV-2 RNA SPEC QL NAA+PROBE: NOT DETECTED

## 2020-08-18 PROCEDURE — 88304 TISSUE EXAM BY PATHOLOGIST: CPT | Performed by: PATHOLOGY

## 2020-08-19 ENCOUNTER — LAB REQUISITION (OUTPATIENT)
Dept: LAB | Facility: HOSPITAL | Age: 65
End: 2020-08-19
Payer: COMMERCIAL

## 2020-08-19 DIAGNOSIS — D12.9 BENIGN NEOPLASM OF ANUS AND ANAL CANAL: ICD-10-CM

## 2020-08-19 DIAGNOSIS — K64.4 RESIDUAL HEMORRHOIDAL SKIN TAGS: ICD-10-CM

## 2020-08-19 DIAGNOSIS — K64.8 OTHER HEMORRHOIDS: ICD-10-CM

## 2020-11-04 ENCOUNTER — OFFICE VISIT (OUTPATIENT)
Dept: FAMILY MEDICINE CLINIC | Facility: CLINIC | Age: 65
End: 2020-11-04
Payer: COMMERCIAL

## 2020-11-04 VITALS
TEMPERATURE: 97.2 F | HEIGHT: 73 IN | SYSTOLIC BLOOD PRESSURE: 120 MMHG | BODY MASS INDEX: 29.82 KG/M2 | DIASTOLIC BLOOD PRESSURE: 72 MMHG | WEIGHT: 225 LBS

## 2020-11-04 DIAGNOSIS — D32.9 MENINGIOMA (HCC): ICD-10-CM

## 2020-11-04 DIAGNOSIS — I83.893 VARICOSE VEINS OF BOTH LEGS WITH EDEMA: Primary | ICD-10-CM

## 2020-11-04 DIAGNOSIS — Z23 NEED FOR IMMUNIZATION AGAINST INFLUENZA: ICD-10-CM

## 2020-11-04 PROCEDURE — 90471 IMMUNIZATION ADMIN: CPT

## 2020-11-04 PROCEDURE — 99213 OFFICE O/P EST LOW 20 MIN: CPT | Performed by: FAMILY MEDICINE

## 2020-11-04 PROCEDURE — 3725F SCREEN DEPRESSION PERFORMED: CPT | Performed by: FAMILY MEDICINE

## 2020-11-04 PROCEDURE — 90662 IIV NO PRSV INCREASED AG IM: CPT

## 2020-11-09 ENCOUNTER — HOSPITAL ENCOUNTER (OUTPATIENT)
Dept: NON INVASIVE DIAGNOSTICS | Facility: HOSPITAL | Age: 65
Discharge: HOME | End: 2020-11-09
Payer: COMMERCIAL

## 2020-11-09 DIAGNOSIS — I83.893 VARICOSE VEINS OF BOTH LEGS WITH EDEMA: ICD-10-CM

## 2020-11-09 PROCEDURE — 93970 EXTREMITY STUDY: CPT

## 2020-11-09 PROCEDURE — 93970 EXTREMITY STUDY: CPT | Performed by: SURGERY

## 2020-11-13 ENCOUNTER — LAB (OUTPATIENT)
Dept: LAB | Facility: MEDICAL CENTER | Age: 65
End: 2020-11-13
Payer: COMMERCIAL

## 2020-11-13 DIAGNOSIS — Z12.5 PROSTATE CANCER SCREENING: ICD-10-CM

## 2020-11-13 LAB — PSA SERPL-MCNC: 3.7 NG/ML (ref 0–4)

## 2020-11-13 PROCEDURE — 36415 COLL VENOUS BLD VENIPUNCTURE: CPT

## 2020-11-13 PROCEDURE — G0103 PSA SCREENING: HCPCS

## 2020-11-19 ENCOUNTER — OFFICE VISIT (OUTPATIENT)
Dept: UROLOGY | Facility: CLINIC | Age: 65
End: 2020-11-19
Payer: COMMERCIAL

## 2020-11-19 VITALS
DIASTOLIC BLOOD PRESSURE: 74 MMHG | WEIGHT: 228 LBS | SYSTOLIC BLOOD PRESSURE: 122 MMHG | BODY MASS INDEX: 30.22 KG/M2 | TEMPERATURE: 96.9 F | HEIGHT: 73 IN | HEART RATE: 80 BPM

## 2020-11-19 DIAGNOSIS — N40.0 BPH WITHOUT URINARY OBSTRUCTION: Primary | ICD-10-CM

## 2020-11-19 LAB

## 2020-11-19 PROCEDURE — 3008F BODY MASS INDEX DOCD: CPT | Performed by: UROLOGY

## 2020-11-19 PROCEDURE — 81002 URINALYSIS NONAUTO W/O SCOPE: CPT | Performed by: UROLOGY

## 2020-11-19 PROCEDURE — 99214 OFFICE O/P EST MOD 30 MIN: CPT | Performed by: UROLOGY

## 2020-11-19 PROCEDURE — 51798 US URINE CAPACITY MEASURE: CPT | Performed by: UROLOGY

## 2020-11-19 PROCEDURE — 1160F RVW MEDS BY RX/DR IN RCRD: CPT | Performed by: UROLOGY

## 2020-11-19 PROCEDURE — 1036F TOBACCO NON-USER: CPT | Performed by: UROLOGY

## 2020-12-18 DIAGNOSIS — M25.562 ACUTE PAIN OF LEFT KNEE: ICD-10-CM

## 2020-12-18 DIAGNOSIS — M54.2 CERVICALGIA: ICD-10-CM

## 2021-02-20 ENCOUNTER — HOSPITAL ENCOUNTER (EMERGENCY)
Facility: HOSPITAL | Age: 66
Discharge: HOME | End: 2021-02-20
Attending: EMERGENCY MEDICINE | Admitting: EMERGENCY MEDICINE
Payer: COMMERCIAL

## 2021-02-20 VITALS
DIASTOLIC BLOOD PRESSURE: 79 MMHG | TEMPERATURE: 98.2 F | SYSTOLIC BLOOD PRESSURE: 123 MMHG | RESPIRATION RATE: 16 BRPM | HEART RATE: 90 BPM | OXYGEN SATURATION: 98 %

## 2021-02-20 DIAGNOSIS — S61.219A FINGER LACERATION: Primary | ICD-10-CM

## 2021-02-20 PROCEDURE — 99282 EMERGENCY DEPT VISIT SF MDM: CPT | Performed by: PHYSICIAN ASSISTANT

## 2021-02-20 PROCEDURE — 12001 RPR S/N/AX/GEN/TRNK 2.5CM/<: CPT | Performed by: PHYSICIAN ASSISTANT

## 2021-02-20 PROCEDURE — 90715 TDAP VACCINE 7 YRS/> IM: CPT | Performed by: PHYSICIAN ASSISTANT

## 2021-02-20 PROCEDURE — 99283 EMERGENCY DEPT VISIT LOW MDM: CPT

## 2021-02-20 PROCEDURE — 90471 IMMUNIZATION ADMIN: CPT

## 2021-02-20 RX ORDER — BACITRACIN, NEOMYCIN, POLYMYXIN B 400; 3.5; 5 [USP'U]/G; MG/G; [USP'U]/G
OINTMENT TOPICAL 2 TIMES DAILY
Qty: 15 G | Refills: 0 | Status: SHIPPED | OUTPATIENT
Start: 2021-02-20 | End: 2021-02-27

## 2021-02-20 RX ORDER — LIDOCAINE HYDROCHLORIDE 10 MG/ML
10 INJECTION, SOLUTION EPIDURAL; INFILTRATION; INTRACAUDAL; PERINEURAL ONCE
Status: COMPLETED | OUTPATIENT
Start: 2021-02-20 | End: 2021-02-20

## 2021-02-20 RX ADMIN — LIDOCAINE HYDROCHLORIDE 10 ML: 10 INJECTION, SOLUTION EPIDURAL; INFILTRATION; INTRACAUDAL; PERINEURAL at 09:27

## 2021-02-20 RX ADMIN — TETANUS TOXOID, REDUCED DIPHTHERIA TOXOID AND ACELLULAR PERTUSSIS VACCINE, ADSORBED 0.5 ML: 5; 2.5; 8; 8; 2.5 SUSPENSION INTRAMUSCULAR at 09:28

## 2021-02-20 NOTE — DISCHARGE INSTRUCTIONS
Please refer to the attached information for strict return instructions  If symptoms worsen or new symptoms develop please return to the ER  Please follow up in 8-10 days with your primary care physician or at urgent care for suture removal  Wash wound gently with mild soap/water daily, pat dry, and keep covered until skin is healed  Use topical antibiotic ointment over area daily after washing/drying wound

## 2021-02-20 NOTE — ED PROVIDER NOTES
History  Chief Complaint   Patient presents with    Finger Injury     Pt has wound to tip of his finger  Una Gary is a 78 yo M presenting with laceration to tip of his left finger sustained on a piece of metal toaster just prior to arrival  Reports he attempted to place pressure on the wound but it has continued to bleed through gauze  Reports unsure of last tetanus vaccination  No blood thinners/aspirin use  History provided by:  Patient   used: No    Finger Laceration  Location:  Finger  Finger laceration location:  L ring finger  Length:  1 cm  Quality: avulsion    Bleeding: controlled with pressure    Laceration mechanism:  Metal edge  Relieved by:  Pressure  Tetanus status:  Unknown  Associated symptoms: no fever, no focal weakness, no numbness and no rash        Prior to Admission Medications   Prescriptions Last Dose Informant Patient Reported? Taking?   piroxicam (FELDENE) 20 mg capsule   No No   Sig: TAKE 1 CAPSULE BY MOUTH EVERY DAY   sodium chloride (DIANA 128) 5 % hypertonic ophthalmic ointment  Self Yes No   Sig: Administer 1 drop to both eyes daily      Facility-Administered Medications: None       Past Medical History:   Diagnosis Date    Knee pain     Spinal stenosis     Varicose veins of lower extremity        Past Surgical History:   Procedure Laterality Date    KNEE SURGERY      LAMINECTOMY      lumbar       Family History   Problem Relation Age of Onset    No Known Problems Mother     Skin cancer Sister      I have reviewed and agree with the history as documented      E-Cigarette/Vaping    E-Cigarette Use Never User      E-Cigarette/Vaping Substances    Nicotine No     THC No     CBD No     Flavoring No     Other No     Unknown No      Social History     Tobacco Use    Smoking status: Never Smoker    Smokeless tobacco: Never Used   Substance Use Topics    Alcohol use: Yes     Comment: socially    Drug use: No       Review of Systems   Constitutional: Negative for chills and fever  HENT: Negative for congestion, rhinorrhea and sore throat  Eyes: Negative for pain and visual disturbance  Respiratory: Negative for cough, shortness of breath and wheezing  Cardiovascular: Negative for chest pain and palpitations  Gastrointestinal: Negative for abdominal pain, nausea and vomiting  Genitourinary: Negative for dysuria, frequency and urgency  Musculoskeletal: Negative for back pain, neck pain and neck stiffness  Skin: Positive for wound  Negative for rash  Neurological: Negative for dizziness, focal weakness, weakness, light-headedness and numbness  Physical Exam  Physical Exam  Constitutional:       General: He is not in acute distress  Appearance: He is well-developed  He is not diaphoretic  HENT:      Head: Normocephalic and atraumatic  Right Ear: External ear normal       Left Ear: External ear normal    Eyes:      Conjunctiva/sclera: Conjunctivae normal       Pupils: Pupils are equal, round, and reactive to light  Neck:      Musculoskeletal: Normal range of motion and neck supple  Cardiovascular:      Rate and Rhythm: Normal rate and regular rhythm  Heart sounds: Normal heart sounds  No murmur  No friction rub  No gallop  Pulmonary:      Effort: Pulmonary effort is normal  No respiratory distress  Breath sounds: Normal breath sounds  No wheezing  Abdominal:      General: There is no distension  Palpations: Abdomen is soft  Tenderness: There is no abdominal tenderness  Lymphadenopathy:      Cervical: No cervical adenopathy  Skin:     General: Skin is warm and dry  Capillary Refill: Capillary refill takes less than 2 seconds  Findings: No erythema or rash  Comments: Small 1 cm avulsion type laceration to tip of left fourth finger  Slow bleeding from site, readily controlled with pressure  Brisk cap refill  Intact sensation to digit     Neurological:      Mental Status: He is alert and oriented to person, place, and time  Motor: No abnormal muscle tone  Coordination: Coordination normal    Psychiatric:         Behavior: Behavior normal          Thought Content: Thought content normal          Judgment: Judgment normal          Vital Signs  ED Triage Vitals [02/20/21 0856]   Temperature Pulse Respirations Blood Pressure SpO2   98 2 °F (36 8 °C) 90 16 123/79 98 %      Temp Source Heart Rate Source Patient Position - Orthostatic VS BP Location FiO2 (%)   Oral Monitor Sitting Right arm --      Pain Score       No Pain           Vitals:    02/20/21 0856   BP: 123/79   Pulse: 90   Patient Position - Orthostatic VS: Sitting         Visual Acuity      ED Medications  Medications   lidocaine (PF) (XYLOCAINE-MPF) 1 % injection 10 mL (10 mL Infiltration Given by Other 2/20/21 4769)   tetanus-diphtheria-acellular pertussis (BOOSTRIX) IM injection 0 5 mL (0 5 mL Intramuscular Given 2/20/21 6612)       Diagnostic Studies  Results Reviewed     None                 No orders to display              Procedures  Laceration repair    Date/Time: 2/20/2021 9:30 AM  Performed by: Griselda Finders, PA-C  Authorized by: Griselda Finders, PA-C   Consent: Verbal consent obtained  Risks and benefits: risks, benefits and alternatives were discussed  Consent given by: patient  Patient understanding: patient states understanding of the procedure being performed  Patient consent: the patient's understanding of the procedure matches consent given  Required items: required blood products, implants, devices, and special equipment available  Patient identity confirmed: verbally with patient  Time out: Immediately prior to procedure a "time out" was called to verify the correct patient, procedure, equipment, support staff and site/side marked as required    Body area: upper extremity  Location details: left ring finger  Laceration length: 1 cm  Foreign bodies: no foreign bodies  Tendon involvement: none  Nerve involvement: none  Vascular damage: no  Anesthesia: local infiltration    Anesthesia:  Local Anesthetic: lidocaine 1% without epinephrine  Anesthetic total: 2 mL      Procedure Details:  Preparation: Patient was prepped and draped in the usual sterile fashion  Irrigation solution: saline and tap water  Irrigation method: tap  Amount of cleaning: standard  Debridement: none  Degree of undermining: none  Skin closure: 5-0 nylon  Number of sutures: 2  Suture technique: 1 figure-of-eight  1 simple interrupted  Approximation: loose  Approximation difficulty: simple  Dressing: non-adhesive packing strip and gauze roll  Patient tolerance: patient tolerated the procedure well with no immediate complications               ED Course                             SBIRT 22yo+      Most Recent Value   SBIRT (24 yo +)   In order to provide better care to our patients, we are screening all of our patients for alcohol and drug use  Would it be okay to ask you these screening questions? No Filed at: 02/20/2021 0901                    [facility]  Number of Diagnoses or Management Options  Finger laceration:   Diagnosis management comments: Small avulsion type laceration to distal tip of left ring finger on metal appliance prior to arrival  Slow bleeding from site, controlled with pressure but persistent  1 figure-of-eight suture and 1 simple interrupted suture placed over site with hemostasis achieved  Dressed with surgicel and nonstick gauze/gauze roll  Tetanus updated today  Follow up in 8-10 days for suture removal, follow up instructions discussed  At home wound care reviewed  Strict return to ED indications discussed      Patient Progress  Patient progress: improved      Disposition  Final diagnoses:   Finger laceration     Time reflects when diagnosis was documented in both MDM as applicable and the Disposition within this note     Time User Action Codes Description Comment    2/20/2021 10:28 AM Shelli Robles Add [G13 [id number]] Finger laceration       ED Disposition     ED Disposition Condition Date/Time Comment    Discharge Stable Sat Feb 20, 2021 10:27 AM Matt Suh discharge to home/self care  Follow-up Information     Follow up With Specialties Details Why Contact Info Additional Information    [facility] [place]  For suture removal in 8-10 days  [address], [address] [place], [phone]     Via the [address] (North/South) Take [address] toward [facility]  Take the [facility] [address]  Keep right and follow signs for [address]/ [place]  Merge onto [address] [place] [address]  In a half mile, take the exit for [address] toward [facility]  In 0 7 miles take the [facility] [employer]  Merge onto [facility]  In 500 feet, turn left on [address] and drive 0 3 miles  [address] will be on your left  [address]) [address] toward [facility]  Take the [facility] [employer]  Merge onto [facility]  In 500 feet, turn left on [address] and drive 0 3 miles  [address] will be on your left  [address]) Take [address] to [address] towards [facility]  In 0 7 miles take the [facility] [employer]  Merge onto [facility]  In 500 feet, turn left on [address] and drive 0 3 miles  [address] will be on your left      [address] [facility] Emergency Department Emergency Medicine  If symptoms worsen [facility] [address]  [address] [facility] [place], [address]  , [facility], [place], [address]          Discharge Medication List as of 2/20/2021 10:30 AM      START taking these medications    Details   neomycin-bacitracin-polymyxin b (NEOSPORIN) ointment Apply topically 2 (two) times a day for 7 days, Starting Sat 2/20/2021, Until Sat 2/27/2021, Normal         CONTINUE these medications which have NOT CHANGED    Details   piroxicam (FELDENE) 20 mg capsule TAKE 1 CAPSULE BY MOUTH EVERY DAY, Normal      sodium chloride (DIANA 128) 5 % hypertonic ophthalmic ointment Administer 1 drop to both eyes daily, Historical Med           No discharge procedures on file      PDMP Review     None          ED Provider  Electronically Signed by           Kerri Handy PA-C  02/20/21 4372

## 2021-02-22 ENCOUNTER — VBI (OUTPATIENT)
Dept: FAMILY MEDICINE CLINIC | Facility: CLINIC | Age: 66
End: 2021-02-22

## 2021-02-22 NOTE — TELEPHONE ENCOUNTER
Frederick Wilson    ED Visit Information     Ed visit date: 2/20/21  Diagnosis Description: Finger laceration  In Network? Yes [facility]  Discharge status: Home  Discharged with meds ? Yes  Number of ED visits to date: 1  ED Severity:N/A     Outreach Information    Outreach successful: Yes 1  Date letter mailed:0  Date Finalized:2/22/21    Care Coordination    Follow up appointment with pcp: no patient will call for suture removal   Transportation issues ? No    Value [employer] type: 3 Day Outreach  Emergent necessity warranted by diagnosis: No  [facility]: Yes  Transportation: Self Transport  Called PCP first?: No  Feels able to call PCP for urgent problems ?: Yes  Understands what emergencies can be handled by PCP ?: Yes  Ever any problems getting appointment with PCP for minor emergency/urgency problems?: No  Practice Contacted Patient ?: No  Pt had ED follow up with pcp/staff ?: No    Seen for follow-up out of network ?: No  Reason Patient went to ED instead of Urgent Care or PCP?: Perceived Severity of Illness  Urgent care Education?: Yes  02/22/2021 01:42 PM Phone (VBI) Osmin Martinez (Self) [phone] (M) Remove  By [address] communication with patient regarding recent ED visit on 2/20/21  Patient stated that he is doing well  Patient will call PCP office to schedule an appt  Patient does not meet OPCM criteria  Patient is unaware of PCP after hour's on-call service, education given  Patient was aware of her nearest [place] [address] urgent care facility, education not given  Patient does feel comfortable contacting PCP office for medical advice and does not have issues with getting a [address] [facility] or next day appointment

## 2021-03-02 ENCOUNTER — OFFICE VISIT (OUTPATIENT)
Dept: FAMILY MEDICINE CLINIC | Facility: CLINIC | Age: 66
End: 2021-03-02
Payer: COMMERCIAL

## 2021-03-02 VITALS
DIASTOLIC BLOOD PRESSURE: 82 MMHG | SYSTOLIC BLOOD PRESSURE: 120 MMHG | BODY MASS INDEX: 30.46 KG/M2 | TEMPERATURE: 97.3 F | WEIGHT: 229.8 LBS | HEIGHT: 73 IN

## 2021-03-02 DIAGNOSIS — L30.9 DERMATITIS: ICD-10-CM

## 2021-03-02 DIAGNOSIS — S61.215D LACERATION OF LEFT RING FINGER WITHOUT FOREIGN BODY WITHOUT DAMAGE TO NAIL, SUBSEQUENT ENCOUNTER: Primary | ICD-10-CM

## 2021-03-02 PROBLEM — S61.215A LACERATION OF LEFT RING FINGER WITHOUT FOREIGN BODY WITHOUT DAMAGE TO NAIL: Status: ACTIVE | Noted: 2021-03-02

## 2021-03-02 PROCEDURE — 1036F TOBACCO NON-USER: CPT | Performed by: FAMILY MEDICINE

## 2021-03-02 PROCEDURE — 3008F BODY MASS INDEX DOCD: CPT | Performed by: FAMILY MEDICINE

## 2021-03-02 PROCEDURE — 1160F RVW MEDS BY RX/DR IN RCRD: CPT | Performed by: FAMILY MEDICINE

## 2021-03-02 PROCEDURE — 99213 OFFICE O/P EST LOW 20 MIN: CPT | Performed by: FAMILY MEDICINE

## 2021-03-02 NOTE — PROGRESS NOTES
Assessment/Plan:  The laceration healed well  Sutures removed at the present time  Neosporin and Band-Aid applied  Guidance given overall  Patient use Mycolog-II cream to rash that he has at home  Uses twice daily as directed  Tetanus shot up-to-date    Laceration healed Diagnoses and all orders for this visit:    Laceration of left ring finger without foreign body without damage to nail, subsequent encounter    Dermatitis            Subjective:        Patient ID: Yordan Davenport is a 77 y o  male  Patient is here status post laceration to the left 4th finger distal tip status post cleaning toaster oven  Patient got finger caught on sharp edge  Patient ultimately went to ER and had 2 sutures placed  Patient was given tetanus shot  The patient using Neosporin twice daily  Neurovascular intact as per patient  No drainage  No significant pain  Patient also with rash on anterior chest wall        The following portions of the patient's history were reviewed and updated as appropriate: allergies, current medications, past family history, past medical history, past social history, past surgical history and problem list       Review of Systems   Constitutional: Negative  HENT: Negative  Eyes: Negative  Respiratory: Negative  Cardiovascular: Negative  Gastrointestinal: Negative  Endocrine: Negative  Genitourinary: Negative  Musculoskeletal: Negative  Skin: Positive for rash and wound  Allergic/Immunologic: Negative  Neurological: Negative  Hematological: Negative  Psychiatric/Behavioral: Negative  Objective:      BMI Counseling: Body mass index is 30 32 kg/m²  The BMI is above normal  Nutrition recommendations include decreasing portion sizes  Exercise recommendations include moderate physical activity 150 minutes/week  Depression Screening and Follow-up Plan: Clincally patient does not have depression  No treatment is required               /82 (BP Location: Right arm, Patient Position: Sitting, Cuff Size: Standard)   Temp (!) 97 3 °F (36 3 °C) (Tympanic)   Ht 6' 1" (1 854 m)   Wt 104 kg (229 lb 12 8 oz)   BMI 30 32 kg/m²          Physical Exam  Vitals signs and nursing note reviewed  Constitutional:       General: He is not in acute distress  Appearance: Normal appearance  He is normal weight  He is not ill-appearing, toxic-appearing or diaphoretic  HENT:      Head: Normocephalic and atraumatic  Right Ear: External ear normal       Left Ear: External ear normal    Skin:     Capillary Refill: Capillary refill takes less than 2 seconds  Findings: Rash present  Comments: The left 4th finger feels well  Sutures intact  No redness or discharge  Neurovascular intact  Rash and anterior chest wall in the right   Neurological:      Mental Status: He is alert  Mental status is at baseline  Psychiatric:         Mood and Affect: Mood normal          Behavior: Behavior normal          Thought Content:  Thought content normal

## 2021-05-25 NOTE — PROGRESS NOTES
Assessment and plan:       1  BPH with LUTS    AUA 23, QOL 3    PVR 18 ml    urine dip trace blood    Has to double void at times    Tamsulosin 0 4 mg daily, has not been taking this    Recommended that he take his flomax   Will restart this   Not interested in cysto/trus for evaluation at this time   Uroflow at next visit    2  Prostate cancer screening    PSA 3 7 (11/13/2020)    GULSHAN up-to-date 11/19/2020 ( 40 g smooth prostate no nodules)    PSA/GULSHAN in November    Follow up in november    3  Microscopic hematuria  · Urine dip positive for blood  · Send urine for urine microscopic    Colon cancer screening: up to date with in the last year  COVID vaccine: had both shots    RICKY Shah    History of Present Illness     Marcelina Campbell is a 77 y o  male with history of BPH and urinary symptoms  He recently saw Dr Deena Lyon on 11/19/2020  He had started and stopped tamsulosin twice and was concerned maybe there was side effects including fatigue  He did notice improvement when he was taking the medication  At his previous visit he was complaining of significant feeling of incomplete bladder emptying, urgency, nocturia  He was overall unhappy with his voiding symptoms  Was recommended that he retry alpha blocker therapy  If he is having significant side effects we can try to changed to Rapaflo  He has not been taking the flomax  IPSS Questionnaire (AUA-7): Over the past month    1)  How often have you had a sensation of not emptying your bladder completely after you finish urinating? 4 - More than half the time   2)  How often have you had to urinate again less than two hours after you finished urinating? 4 - More than half the time   3)  How often have you found you stopped and started again several times when you urinated? 2 - Less than half the time   4) How difficult have you found it to postpone urination?   4 - More than half the time   5) How often have you had a weak urinary stream?  2 - Less than half the time   6) How often have you had to push or strain to begin urination? 2 - Less than half the time   7) How many times did you most typically get up to urinate from the time you went to bed until the time you got up in the morning? 4 - 4 times   Total score:  0-7 mildly symptomatic    8-19 moderately symptomatic    20-35 severely symptomatic     Laboratory     Lab Results   Component Value Date    BUN 16 12/17/2018    CREATININE 0 77 12/17/2018       No components found for: GFR    Lab Results   Component Value Date    CALCIUM 8 7 08/19/2017    K 4 3 08/19/2017    CO2 26 08/19/2017     08/19/2017       Lab Results   Component Value Date    WBC 4 92 08/19/2017    HGB 14 0 08/19/2017    HCT 42 7 08/19/2017    MCV 90 08/19/2017     08/19/2017       Lab Results   Component Value Date    PSA 3 7 11/13/2020    PSA 3 8 05/27/2020    PSA 3 7 05/11/2019       Recent Results (from the past 1 hour(s))   POCT urine dip    Collection Time: 05/26/21  9:54 AM   Result Value Ref Range    LEUKOCYTE ESTERASE,UA -     NITRITE,UA -     SL AMB POCT UROBILINOGEN 0 2     POCT URINE PROTEIN -      PH,UA 5 0     BLOOD,UA trace     SPECIFIC GRAVITY,UA 1 020     KETONES,UA -     BILIRUBIN,UA -     GLUCOSE, UA -      COLOR,UA yellow     CLARITY,UA clear    POCT Measure PVR    Collection Time: 05/26/21  9:55 AM   Result Value Ref Range    POST-VOID RESIDUAL VOLUME, ML POC 18 mL       @RESULT(URINEMICROSCOPIC)@    @RESULT(URINECULTURE)@    Radiology       Review of Systems     Review of Systems   Constitutional: Negative for activity change, appetite change, chills, fatigue, fever and unexpected weight change  HENT: Negative for facial swelling  Eyes: Negative for discharge  Respiratory: Negative  Negative for cough and shortness of breath  Cardiovascular: Negative for chest pain and leg swelling  Gastrointestinal: Negative    Negative for abdominal distention, abdominal pain, constipation, diarrhea, nausea and vomiting  Endocrine: Negative  Genitourinary: Negative  Negative for decreased urine volume, difficulty urinating, dysuria, enuresis, flank pain, frequency, genital sores, hematuria and urgency  Musculoskeletal: Negative for back pain and myalgias  Skin: Negative for pallor and rash  Allergic/Immunologic: Negative  Negative for immunocompromised state  Neurological: Negative for facial asymmetry and speech difficulty  Psychiatric/Behavioral: Negative for agitation and confusion  Allergies     No Known Allergies    Physical Exam     Physical Exam  Vitals signs reviewed  Constitutional:       General: He is not in acute distress  Appearance: Normal appearance  He is normal weight  He is not ill-appearing, toxic-appearing or diaphoretic  HENT:      Head: Normocephalic and atraumatic  Eyes:      General: No scleral icterus  Neck:      Musculoskeletal: Normal range of motion  Cardiovascular:      Rate and Rhythm: Normal rate  Pulmonary:      Effort: Pulmonary effort is normal  No respiratory distress  Abdominal:      General: Abdomen is flat  There is no distension  Palpations: Abdomen is soft  Tenderness: There is no abdominal tenderness  Musculoskeletal:         General: No swelling  Right lower leg: No edema  Left lower leg: No edema  Skin:     General: Skin is warm and dry  Coloration: Skin is not jaundiced or pale  Findings: No rash  Neurological:      General: No focal deficit present  Mental Status: He is alert and oriented to person, place, and time  Gait: Gait normal    Psychiatric:         Mood and Affect: Mood normal          Behavior: Behavior normal          Thought Content:  Thought content normal          Judgment: Judgment normal          Vital Signs     Vitals:    05/26/21 0949   BP: 132/88   Pulse: 70   Weight: 103 kg (226 lb)       Current Medications       Current Outpatient Medications:     piroxicam (FELDENE) 20 mg capsule, TAKE 1 CAPSULE BY MOUTH EVERY DAY, Disp: 30 capsule, Rfl: 0    sodium chloride (DIANA 128) 5 % hypertonic ophthalmic ointment, Administer 1 drop to both eyes daily, Disp: , Rfl:     neomycin-bacitracin-polymyxin b (NEOSPORIN) ointment, Apply topically 2 (two) times a day for 7 days, Disp: 15 g, Rfl: 0    tamsulosin (FLOMAX) 0 4 mg, Take 1 capsule (0 4 mg total) by mouth daily with dinner, Disp: 30 capsule, Rfl: 12    Active Problems     Patient Active Problem List   Diagnosis    BPH without urinary obstruction    Mild vitamin D deficiency    Vertigo    Tinnitus of left ear    Meningioma (HCC)    Cervicalgia    Left knee pain    Pain in both lower extremities    Varicose veins of both legs with edema    Lumbar radiculopathy    Spinal stenosis of lumbar region with neurogenic claudication    Primary osteoarthritis of left knee    Prostate cancer screening    Dermatitis    Laceration of left ring finger without foreign body without damage to nail       Past Medical History     Past Medical History:   Diagnosis Date    Knee pain     Spinal stenosis     Varicose veins of lower extremity        Surgical History     Past Surgical History:   Procedure Laterality Date    COLONOSCOPY      EXCISIONAL HEMORRHOIDECTOMY      KNEE SURGERY      LAMINECTOMY      lumbar       Family History     Family History   Problem Relation Age of Onset    No Known Problems Mother     Skin cancer Sister        Social History     Social History     Social History     Tobacco Use   Smoking Status Never Smoker   Smokeless Tobacco Never Used       Past Surgical History:   Procedure Laterality Date    COLONOSCOPY      EXCISIONAL HEMORRHOIDECTOMY      KNEE SURGERY      LAMINECTOMY      lumbar         The following portions of the patient's history were reviewed and updated as appropriate: allergies, current medications, past family history, past medical history, past social history, past surgical history and problem list    Please note :  Voice dictation software has been used to create this document  There may be inadvertent transcription errors      [id number] [id number] Elvira Redd

## 2021-05-26 ENCOUNTER — OFFICE VISIT (OUTPATIENT)
Dept: UROLOGY | Facility: CLINIC | Age: 66
End: 2021-05-26
Payer: COMMERCIAL

## 2021-05-26 VITALS
WEIGHT: 226 LBS | HEART RATE: 70 BPM | BODY MASS INDEX: 29.82 KG/M2 | DIASTOLIC BLOOD PRESSURE: 88 MMHG | SYSTOLIC BLOOD PRESSURE: 132 MMHG

## 2021-05-26 DIAGNOSIS — N40.0 BPH WITHOUT URINARY OBSTRUCTION: Primary | ICD-10-CM

## 2021-05-26 DIAGNOSIS — Z12.5 PROSTATE CANCER SCREENING: ICD-10-CM

## 2021-05-26 LAB
BACTERIA UR QL AUTO: ABNORMAL /HPF
BILIRUB UR QL STRIP: NEGATIVE
CLARITY UR: CLEAR
COLOR UR: ABNORMAL
GLUCOSE UR STRIP-MCNC: NEGATIVE MG/DL
HGB UR QL STRIP.AUTO: NEGATIVE
HYALINE CASTS #/AREA URNS LPF: ABNORMAL /LPF
KETONES UR STRIP-MCNC: NEGATIVE MG/DL
LEUKOCYTE ESTERASE UR QL STRIP: NEGATIVE
NITRITE UR QL STRIP: NEGATIVE
NON-SQ EPI CELLS URNS QL MICRO: ABNORMAL /HPF
PH UR STRIP.AUTO: 6 [PH]
POST-VOID RESIDUAL VOLUME, ML POC: 18 ML
PROT UR STRIP-MCNC: NEGATIVE MG/DL
RBC #/AREA URNS AUTO: ABNORMAL /HPF
SL AMB  POCT GLUCOSE, UA: NORMAL
SL AMB LEUKOCYTE ESTERASE,UA: NORMAL
SL AMB POCT BILIRUBIN,UA: NORMAL
SL AMB POCT BLOOD,UA: NORMAL
SL AMB POCT CLARITY,UA: CLEAR
SL AMB POCT COLOR,UA: YELLOW
SL AMB POCT KETONES,UA: NORMAL
SL AMB POCT NITRITE,UA: NORMAL
SL AMB POCT PH,UA: 5
SL AMB POCT SPECIFIC GRAVITY,UA: 1.02
SL AMB POCT URINE PROTEIN: NORMAL
SL AMB POCT UROBILINOGEN: 0.2
SP GR UR STRIP.AUTO: 1.02 (ref 1–1.03)
UROBILINOGEN UR QL STRIP.AUTO: 0.2 E.U./DL
WBC #/AREA URNS AUTO: ABNORMAL /HPF

## 2021-05-26 PROCEDURE — 99213 OFFICE O/P EST LOW 20 MIN: CPT | Performed by: NURSE PRACTITIONER

## 2021-05-26 PROCEDURE — 81001 URINALYSIS AUTO W/SCOPE: CPT | Performed by: NURSE PRACTITIONER

## 2021-05-26 PROCEDURE — 81002 URINALYSIS NONAUTO W/O SCOPE: CPT | Performed by: NURSE PRACTITIONER

## 2021-05-26 PROCEDURE — 51798 US URINE CAPACITY MEASURE: CPT | Performed by: NURSE PRACTITIONER

## 2021-05-26 PROCEDURE — 1160F RVW MEDS BY RX/DR IN RCRD: CPT | Performed by: NURSE PRACTITIONER

## 2021-05-26 PROCEDURE — 1036F TOBACCO NON-USER: CPT | Performed by: NURSE PRACTITIONER

## 2021-05-26 RX ORDER — TAMSULOSIN HYDROCHLORIDE 0.4 MG/1
0.4 CAPSULE ORAL
Qty: 30 CAPSULE | Refills: 12 | Status: SHIPPED | OUTPATIENT
Start: 2021-05-26 | End: 2021-11-09 | Stop reason: SDUPTHER

## 2021-05-27 NOTE — RESULT ENCOUNTER NOTE
Please let patient know that we send his urine for testing and there were no signs of blood or infection in his urine

## 2021-06-08 DIAGNOSIS — M54.2 CERVICALGIA: ICD-10-CM

## 2021-06-08 DIAGNOSIS — M25.562 ACUTE PAIN OF LEFT KNEE: ICD-10-CM

## 2021-10-08 ENCOUNTER — TELEPHONE (OUTPATIENT)
Dept: ADMINISTRATIVE | Facility: OTHER | Age: 66
End: 2021-10-08

## 2021-10-22 ENCOUNTER — TELEPHONE (OUTPATIENT)
Dept: NEUROSURGERY | Facility: CLINIC | Age: 66
End: 2021-10-22

## 2021-11-03 ENCOUNTER — APPOINTMENT (OUTPATIENT)
Dept: LAB | Facility: MEDICAL CENTER | Age: 66
End: 2021-11-03
Payer: COMMERCIAL

## 2021-11-03 DIAGNOSIS — Z12.5 PROSTATE CANCER SCREENING: ICD-10-CM

## 2021-11-03 LAB — PSA SERPL-MCNC: 4.2 NG/ML (ref 0–4)

## 2021-11-03 PROCEDURE — G0103 PSA SCREENING: HCPCS

## 2021-11-03 PROCEDURE — 36415 COLL VENOUS BLD VENIPUNCTURE: CPT

## 2021-11-09 ENCOUNTER — OFFICE VISIT (OUTPATIENT)
Dept: UROLOGY | Facility: CLINIC | Age: 66
End: 2021-11-09
Payer: COMMERCIAL

## 2021-11-09 VITALS
DIASTOLIC BLOOD PRESSURE: 86 MMHG | HEIGHT: 73 IN | BODY MASS INDEX: 29.42 KG/M2 | WEIGHT: 222 LBS | SYSTOLIC BLOOD PRESSURE: 134 MMHG

## 2021-11-09 DIAGNOSIS — R31.29 MICROHEMATURIA: ICD-10-CM

## 2021-11-09 DIAGNOSIS — Z12.5 PROSTATE CANCER SCREENING: ICD-10-CM

## 2021-11-09 DIAGNOSIS — N40.0 BPH WITHOUT URINARY OBSTRUCTION: Primary | ICD-10-CM

## 2021-11-09 PROCEDURE — 99213 OFFICE O/P EST LOW 20 MIN: CPT | Performed by: NURSE PRACTITIONER

## 2021-11-09 RX ORDER — TAMSULOSIN HYDROCHLORIDE 0.4 MG/1
0.4 CAPSULE ORAL
Qty: 30 CAPSULE | Refills: 12 | Status: SHIPPED | OUTPATIENT
Start: 2021-11-09

## 2021-11-10 ENCOUNTER — HOSPITAL ENCOUNTER (OUTPATIENT)
Dept: MRI IMAGING | Facility: HOSPITAL | Age: 66
Discharge: HOME | End: 2021-11-10
Payer: COMMERCIAL

## 2021-11-10 DIAGNOSIS — D32.9 MENINGIOMA (HCC): ICD-10-CM

## 2021-11-10 PROCEDURE — A9585 GADOBUTROL INJECTION: HCPCS | Performed by: PHYSICIAN ASSISTANT

## 2021-11-10 PROCEDURE — 70553 MRI BRAIN STEM W/O & W/DYE: CPT

## 2021-11-10 RX ADMIN — GADOBUTROL 10 ML: 604.72 INJECTION INTRAVENOUS at 09:27

## 2021-11-18 ENCOUNTER — OFFICE VISIT (OUTPATIENT)
Dept: NEUROSURGERY | Facility: CLINIC | Age: 66
End: 2021-11-18
Payer: COMMERCIAL

## 2021-11-18 VITALS
BODY MASS INDEX: 29.29 KG/M2 | RESPIRATION RATE: 16 BRPM | SYSTOLIC BLOOD PRESSURE: 110 MMHG | HEIGHT: 73 IN | WEIGHT: 221 LBS | TEMPERATURE: 98.4 F | DIASTOLIC BLOOD PRESSURE: 74 MMHG | HEART RATE: 74 BPM

## 2021-11-18 DIAGNOSIS — D32.9 MENINGIOMA (HCC): Primary | ICD-10-CM

## 2021-11-18 PROCEDURE — 3008F BODY MASS INDEX DOCD: CPT | Performed by: PHYSICIAN ASSISTANT

## 2021-11-18 PROCEDURE — 1036F TOBACCO NON-USER: CPT | Performed by: PHYSICIAN ASSISTANT

## 2021-11-18 PROCEDURE — 1160F RVW MEDS BY RX/DR IN RCRD: CPT | Performed by: PHYSICIAN ASSISTANT

## 2021-11-18 PROCEDURE — 99214 OFFICE O/P EST MOD 30 MIN: CPT | Performed by: PHYSICIAN ASSISTANT

## 2022-01-31 ENCOUNTER — CLINICAL SUPPORT (OUTPATIENT)
Dept: FAMILY MEDICINE CLINIC | Facility: CLINIC | Age: 67
End: 2022-01-31
Payer: COMMERCIAL

## 2022-01-31 DIAGNOSIS — Z23 NEED FOR IMMUNIZATION AGAINST INFLUENZA: Primary | ICD-10-CM

## 2022-01-31 PROCEDURE — 90662 IIV NO PRSV INCREASED AG IM: CPT

## 2022-01-31 PROCEDURE — G0008 ADMIN INFLUENZA VIRUS VAC: HCPCS

## 2022-05-09 ENCOUNTER — OFFICE VISIT (OUTPATIENT)
Dept: FAMILY MEDICINE CLINIC | Facility: CLINIC | Age: 67
End: 2022-05-09
Payer: COMMERCIAL

## 2022-05-09 VITALS
SYSTOLIC BLOOD PRESSURE: 112 MMHG | RESPIRATION RATE: 18 BRPM | OXYGEN SATURATION: 98 % | TEMPERATURE: 97.4 F | DIASTOLIC BLOOD PRESSURE: 68 MMHG | BODY MASS INDEX: 30.22 KG/M2 | HEART RATE: 66 BPM | WEIGHT: 228 LBS | HEIGHT: 73 IN

## 2022-05-09 DIAGNOSIS — M25.562 ACUTE PAIN OF LEFT KNEE: ICD-10-CM

## 2022-05-09 DIAGNOSIS — L30.9 DERMATITIS: ICD-10-CM

## 2022-05-09 DIAGNOSIS — M79.674 TOE PAIN, RIGHT: Primary | ICD-10-CM

## 2022-05-09 DIAGNOSIS — M54.2 CERVICALGIA: ICD-10-CM

## 2022-05-09 DIAGNOSIS — D32.9 MENINGIOMA (HCC): ICD-10-CM

## 2022-05-09 PROCEDURE — 3008F BODY MASS INDEX DOCD: CPT | Performed by: FAMILY MEDICINE

## 2022-05-09 PROCEDURE — 1160F RVW MEDS BY RX/DR IN RCRD: CPT | Performed by: FAMILY MEDICINE

## 2022-05-09 PROCEDURE — 99213 OFFICE O/P EST LOW 20 MIN: CPT | Performed by: FAMILY MEDICINE

## 2022-05-09 PROCEDURE — 1036F TOBACCO NON-USER: CPT | Performed by: FAMILY MEDICINE

## 2022-05-09 NOTE — PROGRESS NOTES
Assessment/Plan:       Diagnoses and all orders for this visit:    Toe pain, right  -     XR foot 3+ vw left; Future  -     CBC and differential; Future  -     Comprehensive metabolic panel; Future  -     TSH, 3rd generation with Free T4 reflex; Future  -     Uric acid; Future    Dermatitis    Meningioma (HCC)    Cervicalgia  -     piroxicam (FELDENE) 20 mg capsule; Take 1 capsule (20 mg total) by mouth daily as needed (Pain)    Acute pain of left knee  -     piroxicam (FELDENE) 20 mg capsule; Take 1 capsule (20 mg total) by mouth daily as needed (Pain)            Subjective:        Patient ID: Sven Sanabria is a 74 y o  male  Patient is here with left toe pain over the past week  The patient has ruled fingers for contrast   Patient has pain over the interphalangeal joint  Patient did notice some redness  The following portions of the patient's history were reviewed and updated as appropriate: allergies, current medications, past family history, past medical history, past social history, past surgical history and problem list       Review of Systems   Constitutional: Negative  HENT: Negative  Eyes: Negative  Respiratory: Negative  Cardiovascular: Negative  Gastrointestinal: Negative  Endocrine: Negative  Genitourinary: Negative  Musculoskeletal: Positive for arthralgias  Skin: Negative  Allergic/Immunologic: Negative  Neurological: Negative  Hematological: Negative  Psychiatric/Behavioral: Negative  Objective:      BMI Counseling: Body mass index is 30 08 kg/m²  The BMI is above normal  Nutrition recommendations include decreasing portion sizes  Exercise recommendations include moderate physical activity 150 minutes/week  Rationale for BMI follow-up plan is due to patient being overweight or obese               /68 (BP Location: Right arm, Patient Position: Sitting, Cuff Size: Adult)   Pulse 66   Temp (!) 97 4 °F (36 3 °C) (Tympanic)   Resp 18 Ht 6' 1" (1 854 m)   Wt 103 kg (228 lb)   SpO2 98%   BMI 30 08 kg/m²          Physical Exam

## 2022-05-10 ENCOUNTER — APPOINTMENT (OUTPATIENT)
Dept: LAB | Facility: MEDICAL CENTER | Age: 67
End: 2022-05-10
Payer: COMMERCIAL

## 2022-05-10 ENCOUNTER — APPOINTMENT (OUTPATIENT)
Dept: RADIOLOGY | Facility: MEDICAL CENTER | Age: 67
End: 2022-05-10
Payer: COMMERCIAL

## 2022-05-10 DIAGNOSIS — M79.674 TOE PAIN, RIGHT: ICD-10-CM

## 2022-05-10 DIAGNOSIS — M79.675 TOE PAIN, LEFT: ICD-10-CM

## 2022-05-10 LAB
ALBUMIN SERPL BCP-MCNC: 3.5 G/DL (ref 3.5–5)
ALP SERPL-CCNC: 63 U/L (ref 46–116)
ALT SERPL W P-5'-P-CCNC: 34 U/L (ref 12–78)
ANION GAP SERPL CALCULATED.3IONS-SCNC: 3 MMOL/L (ref 4–13)
AST SERPL W P-5'-P-CCNC: 30 U/L (ref 5–45)
BASOPHILS # BLD AUTO: 0.06 THOUSANDS/ΜL (ref 0–0.1)
BASOPHILS NFR BLD AUTO: 1 % (ref 0–1)
BILIRUB SERPL-MCNC: 2.09 MG/DL (ref 0.2–1)
BUN SERPL-MCNC: 11 MG/DL (ref 5–25)
CALCIUM SERPL-MCNC: 8.9 MG/DL (ref 8.3–10.1)
CHLORIDE SERPL-SCNC: 108 MMOL/L (ref 100–108)
CO2 SERPL-SCNC: 29 MMOL/L (ref 21–32)
CREAT SERPL-MCNC: 0.83 MG/DL (ref 0.6–1.3)
EOSINOPHIL # BLD AUTO: 0.31 THOUSAND/ΜL (ref 0–0.61)
EOSINOPHIL NFR BLD AUTO: 6 % (ref 0–6)
ERYTHROCYTE [DISTWIDTH] IN BLOOD BY AUTOMATED COUNT: 13.8 % (ref 11.6–15.1)
GFR SERPL CREATININE-BSD FRML MDRD: 90 ML/MIN/1.73SQ M
GLUCOSE P FAST SERPL-MCNC: 91 MG/DL (ref 65–99)
HCT VFR BLD AUTO: 44.1 % (ref 36.5–49.3)
HGB BLD-MCNC: 14.4 G/DL (ref 12–17)
IMM GRANULOCYTES # BLD AUTO: 0.01 THOUSAND/UL (ref 0–0.2)
IMM GRANULOCYTES NFR BLD AUTO: 0 % (ref 0–2)
LYMPHOCYTES # BLD AUTO: 1.34 THOUSANDS/ΜL (ref 0.6–4.47)
LYMPHOCYTES NFR BLD AUTO: 25 % (ref 14–44)
MCH RBC QN AUTO: 30 PG (ref 26.8–34.3)
MCHC RBC AUTO-ENTMCNC: 32.7 G/DL (ref 31.4–37.4)
MCV RBC AUTO: 92 FL (ref 82–98)
MONOCYTES # BLD AUTO: 0.55 THOUSAND/ΜL (ref 0.17–1.22)
MONOCYTES NFR BLD AUTO: 10 % (ref 4–12)
NEUTROPHILS # BLD AUTO: 3.1 THOUSANDS/ΜL (ref 1.85–7.62)
NEUTS SEG NFR BLD AUTO: 58 % (ref 43–75)
NRBC BLD AUTO-RTO: 0 /100 WBCS
PLATELET # BLD AUTO: 262 THOUSANDS/UL (ref 149–390)
PMV BLD AUTO: 9.2 FL (ref 8.9–12.7)
POTASSIUM SERPL-SCNC: 4.6 MMOL/L (ref 3.5–5.3)
PROT SERPL-MCNC: 6.5 G/DL (ref 6.4–8.2)
RBC # BLD AUTO: 4.8 MILLION/UL (ref 3.88–5.62)
SODIUM SERPL-SCNC: 140 MMOL/L (ref 136–145)
TSH SERPL DL<=0.05 MIU/L-ACNC: 2.14 UIU/ML (ref 0.45–4.5)
URATE SERPL-MCNC: 4.6 MG/DL (ref 4.2–8)
WBC # BLD AUTO: 5.37 THOUSAND/UL (ref 4.31–10.16)

## 2022-05-10 PROCEDURE — 73630 X-RAY EXAM OF FOOT: CPT

## 2022-05-10 PROCEDURE — 80053 COMPREHEN METABOLIC PANEL: CPT

## 2022-05-10 PROCEDURE — 84443 ASSAY THYROID STIM HORMONE: CPT

## 2022-05-10 PROCEDURE — 84550 ASSAY OF BLOOD/URIC ACID: CPT

## 2022-05-10 PROCEDURE — 85025 COMPLETE CBC W/AUTO DIFF WBC: CPT

## 2022-05-10 PROCEDURE — 36415 COLL VENOUS BLD VENIPUNCTURE: CPT

## 2022-06-16 ENCOUNTER — TELEPHONE (OUTPATIENT)
Dept: NEUROSURGERY | Facility: CLINIC | Age: 67
End: 2022-06-16

## 2022-06-16 NOTE — TELEPHONE ENCOUNTER
Received a call from the patient inquiring if it is safe for him to obtain xrays at the dental office for a follow up  It is concerned about obtaining dental xrays due to history of meningioma  Advised patient there are no neurosurgical contraindications for the patient to obtain a dental xray as long as the xray is recommended by the dentist  Patient expressed understanding and was appreciative

## 2022-08-08 ENCOUNTER — TELEPHONE (OUTPATIENT)
Dept: FAMILY MEDICINE CLINIC | Facility: CLINIC | Age: 67
End: 2022-08-08

## 2022-08-16 ENCOUNTER — OFFICE VISIT (OUTPATIENT)
Dept: FAMILY MEDICINE CLINIC | Facility: CLINIC | Age: 67
End: 2022-08-16
Payer: COMMERCIAL

## 2022-08-16 VITALS
OXYGEN SATURATION: 96 % | HEIGHT: 73 IN | TEMPERATURE: 96.4 F | WEIGHT: 225.8 LBS | HEART RATE: 87 BPM | SYSTOLIC BLOOD PRESSURE: 124 MMHG | DIASTOLIC BLOOD PRESSURE: 74 MMHG | BODY MASS INDEX: 29.93 KG/M2

## 2022-08-16 DIAGNOSIS — B35.1 ONYCHOMYCOSIS: ICD-10-CM

## 2022-08-16 DIAGNOSIS — M79.674 TOE PAIN, RIGHT: Primary | ICD-10-CM

## 2022-08-16 DIAGNOSIS — Z00.00 MEDICARE ANNUAL WELLNESS VISIT, SUBSEQUENT: ICD-10-CM

## 2022-08-16 PROCEDURE — 1125F AMNT PAIN NOTED PAIN PRSNT: CPT | Performed by: FAMILY MEDICINE

## 2022-08-16 PROCEDURE — 3288F FALL RISK ASSESSMENT DOCD: CPT | Performed by: FAMILY MEDICINE

## 2022-08-16 PROCEDURE — 1160F RVW MEDS BY RX/DR IN RCRD: CPT | Performed by: FAMILY MEDICINE

## 2022-08-16 PROCEDURE — 1170F FXNL STATUS ASSESSED: CPT | Performed by: FAMILY MEDICINE

## 2022-08-16 PROCEDURE — G0439 PPPS, SUBSEQ VISIT: HCPCS | Performed by: FAMILY MEDICINE

## 2022-08-16 PROCEDURE — 3725F SCREEN DEPRESSION PERFORMED: CPT | Performed by: FAMILY MEDICINE

## 2022-08-16 PROCEDURE — 99213 OFFICE O/P EST LOW 20 MIN: CPT | Performed by: FAMILY MEDICINE

## 2022-08-16 RX ORDER — UREA 40 %
CREAM (GRAM) TOPICAL DAILY
Qty: 227 G | Refills: 2 | Status: SHIPPED | OUTPATIENT
Start: 2022-08-16

## 2022-08-16 NOTE — PROGRESS NOTES
Assessment and Plan:     Problem List Items Addressed This Visit        Other    Toe pain, right - Primary    Relevant Medications    urea (CARMOL) 40 %    Other Relevant Orders    Ambulatory Referral to Podiatry    Medicare annual wellness visit, subsequent      Other Visit Diagnoses     Onychomycosis        Relevant Orders    Ambulatory Referral to Podiatry        22-year-old male with: Toe pain along with onychomycosis will refer to Podiatry and give urea cream   Patient also here for a [address] discussed supportive care return parameters and various safety and health maintenance issues at length discussed supportive care return parameters otherwise    BMI Counseling: Body mass index is 29 79 kg/m²  The BMI is above normal  Nutrition recommendations include encouraging healthy choices of fruits and vegetables  Exercise recommendations include moderate physical activity 150 minutes/week  Rationale for BMI follow-up plan is due to patient being overweight or obese  Depression Screening and Follow-up Plan: Patient was screened for depression during today's encounter  They screened negative with a PHQ-2 score of 2  Preventive health issues were discussed with patient, and age appropriate screening tests were ordered as noted in patient's After Visit Summary  Personalized health advice and appropriate referrals for health education or preventive services given if needed, as noted in patient's After Visit Summary  History of Present Illness:     Patient presents for a Medicare Wellness Visit    Patient is a 22-year-old male who presents complaining of toe pain bilaterally along with abnormal nails no fevers chills nausea vomiting tolerating p o  intake he has dry cracked skin as well     Patient Care Team:  Radha Butler DO as PCP - General     Review of Systems:     Review of Systems   Constitutional: Negative  HENT: Negative  Eyes: Negative  Respiratory: Negative  Cardiovascular: Negative  Gastrointestinal: Negative  Endocrine: Negative  Genitourinary: Negative  Musculoskeletal: Negative  Allergic/Immunologic: Negative  Neurological: Negative  Hematological: Negative  Psychiatric/Behavioral: Negative  All other systems reviewed and are negative         Problem List:     Patient Active Problem List   Diagnosis    BPH without urinary obstruction    Mild vitamin D deficiency    Vertigo    Tinnitus of left ear    Meningioma (HCC)    Cervicalgia    Left knee pain    Pain in both lower extremities    Varicose veins of both legs with edema    Lumbar radiculopathy    Spinal stenosis of lumbar region with neurogenic claudication    Primary osteoarthritis of left knee    Prostate cancer screening    Dermatitis    Laceration of left ring finger without foreign body without damage to nail    Microhematuria    Toe pain, right    Medicare annual wellness visit, subsequent      Past Medical and Surgical History:     Past Medical History:   Diagnosis Date    Knee pain     Spinal stenosis     Varicose veins of lower extremity      Past Surgical History:   Procedure Laterality Date    COLONOSCOPY      EXCISIONAL HEMORRHOIDECTOMY      KNEE SURGERY      LAMINECTOMY      lumbar      Family History:     Family History   Problem Relation Age of Onset    No Known Problems Mother     Skin cancer Sister       Social History:     Social History     Socioeconomic History    Marital status: Single     Spouse name: None    Number of children: None    Years of education: None    Highest education level: None   Occupational History    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   Tobacco Use    Smoking status: Never Smoker    Smokeless tobacco: Never Used   Vaping Use    Vaping Use: Never used   Substance and Sexual Activity    Alcohol use: Yes     Comment: socially    Drug use: No    Sexual activity: Not Currently   Other Topics Concern    None   Social History Narrative    None     Social Determinants of Health     Financial Resource Strain: Low Risk     Difficulty of Paying Living Expenses: Not hard at all   Food Insecurity: Not on file   Transportation Needs: No Transportation Needs    Lack of Transportation (Medical): No    Lack of Transportation (Non-Medical): No   Physical Activity: Not on file   Stress: Not on file   Social Connections: Not on file   Intimate Partner Violence: Not on file   Housing Stability: Not on file      Medications and Allergies:     Current Outpatient Medications   Medication Sig Dispense Refill    piroxicam (FELDENE) 20 mg capsule Take 1 capsule (20 mg total) by mouth daily as needed (Pain) 30 capsule 3    sodium chloride (DIANA 128) 5 % hypertonic ophthalmic ointment Administer 1 drop to both eyes daily      tamsulosin (FLOMAX) 0 4 mg Take 1 capsule (0 4 mg total) by mouth daily with dinner 30 capsule 12    urea (CARMOL) 40 % Apply topically daily 227 g 2    neomycin-bacitracin-polymyxin b (NEOSPORIN) ointment Apply topically 2 (two) times a day for 7 days 15 g 0     No current facility-administered medications for this visit  No Known Allergies   Immunizations:     Immunization History   Administered Date(s) Administered    COVID-19 MODERNA VACC 0 5 ML IM 02/10/2021, 03/12/2021, 10/28/2021, 04/01/2022    Influenza, high dose seasonal 0 7 mL 11/04/2020, 01/31/2022    Tdap 02/20/2021      Health Maintenance:         Topic Date Due    Colorectal Cancer Screening  10/20/2025    Hepatitis C Screening  Discontinued         Topic Date Due    Pneumococcal Vaccine: 65+ Years (1 - PCV) Never done    Influenza Vaccine (1) 09/01/2022      Medicare Screening Tests and Risk Assessments:     Jayy Casillas is here for his Subsequent Wellness visit  Last Medicare Wellness visit information reviewed, patient interviewed and updates made to the record today  Health Risk Assessment:   Patient rates overall health as good   Patient feels that their physical health rating is same  Patient is satisfied with their life  Eyesight was rated as same  Hearing was rated as same  Patient feels that their emotional and mental health rating is same  Patients states they are never, rarely angry  Patient states they are sometimes unusually tired/fatigued  Pain experienced in the last 7 days has been some  Patient's pain rating has been 5/10  Patient states that he has experienced no weight loss or gain in last 6 months  Depression Screening:   PHQ-2 Score: 2      Fall Risk Screening: In the past year, patient has experienced: no history of falling in past year      Home Safety:  Patient does not have trouble with stairs inside or outside of their home  Patient has working smoke alarms and has working carbon monoxide detector  Home safety hazards include: none  Nutrition:   Current diet is Regular  Medications:   Patient is not currently taking any over-the-counter supplements  Patient is able to manage medications  Activities of Daily Living (ADLs)/Instrumental Activities of Daily Living (IADLs):   Walk and transfer into and out of bed and chair?: Yes  Dress and groom yourself?: Yes    Bathe or shower yourself?: Yes    Feed yourself?  Yes  Do your laundry/housekeeping?: Yes  Manage your money, pay your bills and track your expenses?: Yes  Make your own meals?: Yes    Do your own shopping?: Yes    Advance Care Planning:   Living will: No    Durable POA for healthcare: No    Advanced directive: No      Cognitive Screening:   Provider or family/friend/caregiver concerned regarding cognition?: No    PREVENTIVE SCREENINGS      Cardiovascular Screening:    General: Screening Current      Diabetes Screening:     General: Screening Current      Colorectal Cancer Screening:     General: Screening Current      Prostate Cancer Screening:    General: Screening Current      Osteoporosis Screening:    General: Screening Not Indicated      Abdominal Aortic Aneurysm (AAA) Screening:    Risk factors include: age between 73-69 yo        General: Screening Not Indicated      Lung Cancer Screening:     General: Screening Not Indicated      Hepatitis C Screening:    General: Screening Not Indicated    No exam data present     Physical Exam:     /74 (BP Location: Left arm, Patient Position: Sitting, Cuff Size: Large)   Pulse 87   Temp (!) 96 4 °F (35 8 °C) (Tympanic)   Ht 6' 1" (1 854 m)   Wt 102 kg (225 lb 12 8 oz)   SpO2 96%   BMI 29 79 kg/m²     Physical Exam  Vitals reviewed  Constitutional:       General: He is not in acute distress  Appearance: He is well-developed  He is not diaphoretic  HENT:      Head: Normocephalic and atraumatic  Right Ear: External ear normal       Left Ear: External ear normal       Nose: Nose normal    Eyes:      General: No scleral icterus  Right eye: No discharge  Left eye: No discharge  Conjunctiva/sclera: Conjunctivae normal       Pupils: Pupils are equal, round, and reactive to light  Neck:      Thyroid: No thyromegaly  Trachea: No tracheal deviation  Cardiovascular:      Rate and Rhythm: Normal rate and regular rhythm  Heart sounds: Normal heart sounds  No murmur heard  No friction rub  Pulmonary:      Effort: Pulmonary effort is normal  No respiratory distress  Breath sounds: Normal breath sounds  No stridor  No wheezing or rales  Abdominal:      General: There is no distension  Palpations: Abdomen is soft  There is no mass  Tenderness: There is no abdominal tenderness  There is no guarding or rebound  Musculoskeletal:         General: Normal range of motion  Cervical back: Normal range of motion and neck supple  Lymphadenopathy:      Cervical: No cervical adenopathy  Skin:     General: Skin is warm  Neurological:      Mental Status: He is alert and oriented to person, place, and time  Cranial Nerves: No cranial nerve deficit     Psychiatric: Behavior: Behavior normal          Thought Content:  Thought content normal          Judgment: Judgment normal           Pedro Green MD

## 2022-08-19 PROBLEM — Z00.00 MEDICARE ANNUAL WELLNESS VISIT, SUBSEQUENT: Status: ACTIVE | Noted: 2022-08-19

## 2022-09-08 ENCOUNTER — TELEPHONE (OUTPATIENT)
Dept: UROLOGY | Facility: AMBULATORY SURGERY CENTER | Age: 67
End: 2022-09-08

## 2022-09-08 DIAGNOSIS — N40.0 BPH WITHOUT URINARY OBSTRUCTION: ICD-10-CM

## 2022-09-08 RX ORDER — TAMSULOSIN HYDROCHLORIDE 0.4 MG/1
0.4 CAPSULE ORAL
Qty: 90 CAPSULE | Refills: 3 | Status: SHIPPED | OUTPATIENT
Start: 2022-09-08 | End: 2022-12-07

## 2022-09-08 NOTE — TELEPHONE ENCOUNTER
Pt under care of Doris    Pt called to make yr appt and stated he has flomax at home but they  in 2022 he wanted to know if it was still ok to take them or if he should have a new script called in for him      Pt call back-[phone]

## 2022-10-11 ENCOUNTER — CLINICAL SUPPORT (OUTPATIENT)
Dept: FAMILY MEDICINE CLINIC | Facility: CLINIC | Age: 67
End: 2022-10-11
Payer: COMMERCIAL

## 2022-10-11 DIAGNOSIS — Z23 NEED FOR INFLUENZA VACCINATION: Primary | ICD-10-CM

## 2022-10-11 PROCEDURE — G0008 ADMIN INFLUENZA VIRUS VAC: HCPCS

## 2022-10-11 PROCEDURE — 90662 IIV NO PRSV INCREASED AG IM: CPT

## 2022-10-12 PROBLEM — S61.215A LACERATION OF LEFT RING FINGER WITHOUT FOREIGN BODY WITHOUT DAMAGE TO NAIL: Status: RESOLVED | Noted: 2021-03-02 | Resolved: 2022-10-12

## 2022-10-18 PROBLEM — Z00.00 MEDICARE ANNUAL WELLNESS VISIT, SUBSEQUENT: Status: RESOLVED | Noted: 2022-08-19 | Resolved: 2022-10-18

## 2022-11-08 ENCOUNTER — APPOINTMENT (OUTPATIENT)
Dept: LAB | Facility: MEDICAL CENTER | Age: 67
End: 2022-11-08

## 2022-11-08 ENCOUNTER — VBI (OUTPATIENT)
Dept: ADMINISTRATIVE | Facility: OTHER | Age: 67
End: 2022-11-08

## 2022-11-08 DIAGNOSIS — Z12.5 PROSTATE CANCER SCREENING: ICD-10-CM

## 2022-11-08 LAB — PSA SERPL-MCNC: 5.3 NG/ML (ref 0–4)

## 2022-11-08 NOTE — TELEPHONE ENCOUNTER
Upon review of the In Basket request we Referral update     Any additional questions or concerns should be emailed to the Practice Liaisons via the appropriate education email address, please do not reply via In Basket      Thank you  Lazarus Gunning

## 2022-11-10 ENCOUNTER — OFFICE VISIT (OUTPATIENT)
Dept: UROLOGY | Facility: CLINIC | Age: 67
End: 2022-11-10

## 2022-11-10 VITALS
BODY MASS INDEX: 29.55 KG/M2 | SYSTOLIC BLOOD PRESSURE: 110 MMHG | HEART RATE: 80 BPM | DIASTOLIC BLOOD PRESSURE: 70 MMHG | HEIGHT: 73 IN | WEIGHT: 223 LBS

## 2022-11-10 DIAGNOSIS — R97.20 ELEVATED PSA: ICD-10-CM

## 2022-11-10 DIAGNOSIS — N40.0 BPH WITHOUT URINARY OBSTRUCTION: Primary | ICD-10-CM

## 2022-11-10 DIAGNOSIS — R31.29 MICROHEMATURIA: ICD-10-CM

## 2022-11-10 LAB
BACTERIA UR QL AUTO: ABNORMAL /HPF
BILIRUB UR QL STRIP: NEGATIVE
CLARITY UR: CLEAR
COLOR UR: ABNORMAL
GLUCOSE UR STRIP-MCNC: NEGATIVE MG/DL
HGB UR QL STRIP.AUTO: NEGATIVE
KETONES UR STRIP-MCNC: NEGATIVE MG/DL
LEUKOCYTE ESTERASE UR QL STRIP: NEGATIVE
MUCOUS THREADS UR QL AUTO: ABNORMAL
NITRITE UR QL STRIP: NEGATIVE
NON-SQ EPI CELLS URNS QL MICRO: ABNORMAL /HPF
PH UR STRIP.AUTO: 6.5 [PH]
PROT UR STRIP-MCNC: NEGATIVE MG/DL
RBC #/AREA URNS AUTO: ABNORMAL /HPF
SL AMB  POCT GLUCOSE, UA: ABNORMAL
SL AMB LEUKOCYTE ESTERASE,UA: ABNORMAL
SL AMB POCT BILIRUBIN,UA: ABNORMAL
SL AMB POCT BLOOD,UA: ABNORMAL
SL AMB POCT CLARITY,UA: CLEAR
SL AMB POCT COLOR,UA: YELLOW
SL AMB POCT KETONES,UA: ABNORMAL
SL AMB POCT NITRITE,UA: ABNORMAL
SL AMB POCT PH,UA: 6
SL AMB POCT SPECIFIC GRAVITY,UA: 1.01
SL AMB POCT URINE PROTEIN: ABNORMAL
SL AMB POCT UROBILINOGEN: 0.2
SP GR UR STRIP.AUTO: 1.01 (ref 1–1.03)
UROBILINOGEN UR STRIP-ACNC: <2 MG/DL
WBC #/AREA URNS AUTO: ABNORMAL /HPF

## 2022-11-10 NOTE — ASSESSMENT & PLAN NOTE
· PSA 5 3, was 4 2  · Recheck psa in 2 weeks  · If elevated recommend prostate biopsy +/-MRI of the prostate  · Follow up 6 months with PSA

## 2022-11-10 NOTE — PATIENT INSTRUCTIONS
Pirads score  Recheck psa in 2 weeks    BLADDER HEALTH    WHAT IS CONSIDERED NORMAL? The average bladder can hold about 2 cups of urine before it needs to be emptied  The normal range of voiding urine is 6 to 8 times during a 24 hour period  As we get older, our bladder capacity can get smaller and we may need to pass urine more frequently but usually not more than every 2 hours  Urine should flow easily without discomfort in a good, steady stream until the bladder is empty  No pushing or straining is necessary to empty the bladder  An urge is a signal that you feel as the bladder stretches to fill with urine  Urges can be felt even if the bladder is not full  Urges are not commands to go to the toilet, merely a signal and can be controlled  WHAT ARE GOOD BLADDER HABITS? Take your time when emptying your bladder  Don’t strain or push to empty your bladder  Make sure you empty your bladder completely each time you pass urine  Do not rush the process  Consistently ignoring the urge to go (waiting more than 4 hours between toileting) or urinating too infrequently may be convenient but not healthy for your bladder  Avoid going to the toilet “just in case” or more often than every 2 hours  It is usually not necessary to go when you feel the first urge  Try to go only when your bladder is full  Urgency and frequency of urination can be improved by retraining the bladder and spacing your fluid intake throughout the day  Practice good toilet habits  Don’t let your bladder control your life  TIPS TO MAINTAIN GOOD BLADDER HABITS  Maintain a good fluid intake  Depending on your body size and environment, drink 6 -8 cups (8 oz each) of fluid per day unless otherwise advised by your doctor  Not enough fluid creates a foul odor and dark color of the urine    Limit the amount of caffeine (coffee, cola, chocolate or tea) and citrus foods that you consume as these foods can be associated with increased sensation of urinary urgency and frequency  Limit the amount of alcohol you drink  Alcohol increases urine production and makes it difficult for the brain to coordinate bladder control  Avoid constipation by maintaining a balanced diet of dietary fiber  Cigarette smoking is also irritating to the bladder surface and is associated with bladder cancer  In addition, the coughing associated with smoking may lead to increased incontinent episodes because of the increased pressure  HOW DIET CAN AFFECT YOUR BLADDER  Although there is no particular "diet" that can cure bladder control, there are certain dietary suggestions you can use to help control the problem  There are 2 points to consider when evaluating how your habits and diet may affect your bladder:    Foods and fluids can irritate the bladder  Some foods and beverages are thought to contribute to bladder leakage and irritability  However their effect on the bladder is not completely understood and you may want to see if eliminating one or all of these items improves your bladder control  If you are unable to give them up completely, it is recommended that you use the following items in moderation:  Acidic beverages and foods (orange juice, grapefruit juice, lemonade etc)  Alcoholic beverages  Vinegar  Coffee (regular and decaf)  Tea (regular and decaf)  Caffeinated beverages  Carbonated beverages          Drinking enough and the right kinds of fluids  Many people with bladder control issues decrease their intake of liquids in hope that they will need to urinate less frequently or have less urinary leakage  You should not restrict fluids to control your bladder  While a decrease in liquid intake does result in a decrease in the volume of urine, the smaller amount of urine may be more highly concentrated        Highly concentrated, dark yellow urine is irritating to the bladder surface and may actually cause you to go to the bathroom more frequently  It also encourages the growth of bacteria, which may lead to infections resulting in incontinence  Substitutions for Bladder Irritants: water is always the best beverage choice  Grape and apple juice are thirst quenchers are good selections and are not as irritating to the bladder  Low acid fruits:  Pears, apricots, papaya, watermelon  For coffee drinkers: KAVA®, Postum®, Consuelo®, Kaffree Dana®  For tea drinkers:  non-citrus or herbal and sun brewed tea    Prostate specific antigen measurement   GENERAL INFORMATION:  What is this test?  This test measures the amount of prostate specific antigen (PSA) in blood  This test may be used when benign prostatic hyperplasia (BPH) is suspected  It may also be used to screen for prostate cancer, to help diagnose prostate cancer when it is suspected, and to monitor prostate cancer after treatment  What are other names for this test?  PSA measurement  PSA - Prostate-specific antigen level  PSA - Serum prostate specific antigen level  tPSA measurement - Total prostate specific antigen measurement  What are related tests? Rectal examination  Transrectal biopsy of prostate  Why do I need this test?  Laboratory tests may be done for many reasons  Tests are performed for routine health screenings or if a disease or toxicity is suspected  Lab tests may be used to determine if a medical condition is improving or worsening  Lab tests may also be used to measure the success or failure of a medication or treatment plan  Lab tests may be ordered for professional or legal reasons  You may need this test if you have:   BPH - Benign prostatic hypertrophy  Prostate cancer  When and how often should I have this test?  When and how often laboratory tests are done may depend on many factors  The timing of laboratory tests may rely on the results or completion of other tests, procedures, or treatments   Lab tests may be performed immediately in an emergency, or tests may be delayed as a condition is treated or monitored  A test may be suggested or become necessary when certain signs or symptoms appear  Due to changes in the way your body naturally functions through the course of a day, lab tests may need to be performed at a certain time of day  If you have prepared for a test by changing your food or fluid intake, lab tests may be timed in accordance with those changes  Timing of tests may be based on increased and decreased levels of medications, drugs or other substances in the body  The age or gender of the person being tested may affect when and how often a lab test is required  Chronic or progressive conditions may need ongoing monitoring through the use of lab tests  Conditions that worsen and improve may also need frequent monitoring  Certain tests may be repeated to obtain a series of results, or tests may need to be repeated to confirm or disprove results  Timing and frequency of lab tests may vary if they are performed for professional or legal reasons  How should I get ready for the test?  Before having blood collected, tell the person drawing your blood if you are allergic to latex  Tell the healthcare worker if you have a medical condition or are using a medication or supplement that causes excessive bleeding  Also tell the healthcare worker if you have felt nauseated, lightheaded, or have fainted while having blood drawn in the past   How is the test done? When a blood sample from a vein is needed, a vein in your arm is usually selected  A tourniquet (large rubber strap) may be secured above the vein  The skin over the vein will be cleaned, and a needle will be inserted  You will be asked to hold very still while your blood is collected  Blood will be collected into one or more tubes, and the tourniquet will be removed  When enough blood has been collected, the healthcare worker will take the needle out  How will the test feel?   The amount of discomfort you feel will depend on many factors, including your sensitivity to pain  Communicate how you are feeling with the person doing the test  Inform the person doing the test if you feel that you cannot continue with the test   During a blood draw, you may feel mild discomfort at the location where the blood sample is being collected  What should I do after the test?  After a blood sample is collected from your vein, a bandage, cotton ball, or gauze may be placed on the area where the needle was inserted  You may be asked to apply pressure to the area  Avoid strenuous exercise immediately after your blood draw  Contact your healthcare worker if you feel pain or see redness, swelling, or discharge from the puncture site  What are the risks? Blood: During a blood draw, a hematoma (blood-filled bump under the skin) or slight bleeding from the puncture site may occur  After a blood draw, a bruise or infection may occur at the puncture site  The person doing this test may need to perform it more than once  Talk to your healthcare worker if you have any concerns about the risks of this test   What are normal results for this test?  Laboratory test results may vary depending on your age, gender, health history, the method used for the test, and many other factors  If your results are different from the results suggested below, this may not mean that you have a disease  Contact your healthcare worker if you have any questions  The following are considered to be normal results for this test:  Males, ?36years of age: 0-2 ng/mL (0-2 mcg/L) :  Males, >36years of age: 0-4 ng/mL (0-4 mcg/L)  Females: <0 5 ng/mL (<0 5 mcg/L) : What might affect my test results?   Drug Therapy Use:  Some medications may affect the results of the test  These medications include:  Finasteride (Oral route, Tablet)  Indium In 111 Capromab Pendetide (Intravenous route, Kit)  Ejaculation and digital rectal exam can cause an insignificant rise in PSA levels while prostate biopsy and cystoscopy can cause substantial increases; PSA testing should be delayed until 3 to 4 weeks after these procedures  Exercise, infection, and some medications can also cause a rise in PSA levels  Finasteride will lower PSA by approximately 50%   What follow up should I do after this test?  Ask your healthcare worker how you will be informed of the test results  You may be asked to call for results, schedule an appointment to discuss results, or notified of results by mail  Follow up care varies depending on many factors related to your test  Sometimes there is no follow up after you have been notified of test results  At other times follow up may be suggested or necessary  Some examples of follow up care include changes to medication or treatment plans, referral to a specialist, more or less frequent monitoring, and additional tests or procedures  Talk with your healthcare worker about any concerns or questions you have regarding follow up care or instructions  Where can I get more information? Related Companies   American Urological Association - https://www dunne org/  org  National Kidney and Urologic Diseases Information Clearinghouse - http://kidney  niddk nih gov/    CARE AGREEMENT:   You have the right to help plan your care  To help with this plan, you must learn about your health condition and how it may be treated  You can then discuss treatment options with your caregivers  Work with them to decide what care may be used to treat you  You always have the right to refuse treatment  © Copyright [address] 2021  Information is for End User's use only and may not be sold, redistributed or otherwise used for commercial purposes  The above information is an  only  It is not intended as a substitute for medical advice for your individual conditions or treatments   Talk to your doctor, nurse or pharmacist before following any medical regimen to see if it is safe and effective for you

## 2022-11-10 NOTE — PROGRESS NOTES
Assessment and plan:     BPH without urinary obstruction  · AUA 18, was 24  · continue tamsulosin  · Follow up 1 year    Microhematuria  · Send urine microscopic  · Hematuria work up if true microscopic hematuria  · Follow up 1 year    Elevated PSA  · PSA 5 3, was 4 2  · Recheck psa in 2 weeks  · If elevated recommend prostate biopsy +/-MRI of the prostate  · Follow up 6 months with PSA      RICKY Peguero    History of Present Illness     Jason Plunkett is a 69 y o  male with history of BPH and urinary symptoms   He saw Dr Raúl Santana on 11/19/2020  He followed up with me on 05/26/2021 at which point he was planning to resume his tamsulosin  He has not been taking this however  He is not interested in a cysto TRUS for evaluation of his bladder outlet obstruction  He had started and stopped tamsulosin twice and was concerned maybe there was side effects including fatigue   He did notice improvement when he was taking the medication    At his previous visit he was complaining of significant feeling of incomplete bladder emptying, urgency, nocturia   he notices improvement of urinary symptoms when he takes the flomax  He has been taking it consisantly for 1 week  reports slight headache intermittently, he was drinking wine at the time however  He would like to continue it, discussed changing to rapaflo, he is not interested  He urinated prior to coming into the room and uroflow was not completed  His AUA 19  He takes the flomax intermittently  He forgets, he will adjust the time in hopes to remember      His PSA is 4 2, previously 3 7  denies family history of prostate cancer     His in office urine dip was positive for blood at his previous visit however the formal urine microscopic did not have any blood    He denies any gross hematuria      Laboratory     Lab Results   Component Value Date    BUN 11 05/10/2022    CREATININE 0 83 05/10/2022       No components found for: GFR    Lab Results   Component Value Date    CALCIUM 8 9 05/10/2022    K 4 6 05/10/2022    CO2 29 05/10/2022     05/10/2022       Lab Results   Component Value Date    WBC 5 37 05/10/2022    HGB 14 4 05/10/2022    HCT 44 1 05/10/2022    MCV 92 05/10/2022     05/10/2022       Lab Results   Component Value Date    PSA 5 3 (H) 11/08/2022    PSA 4 2 (H) 11/03/2021    PSA 3 7 11/13/2020       Recent Results (from the past 1 hour(s))   POCT urine dip    Collection Time: 11/10/22  9:22 AM   Result Value Ref Range    LEUKOCYTE ESTERASE,UA -     NITRITE,UA -     SL AMB POCT UROBILINOGEN 0 2     POCT URINE PROTEIN -      PH,UA 6 0     BLOOD,UA +     SPECIFIC GRAVITY,UA 1 015     KETONES,UA -     BILIRUBIN,UA -     GLUCOSE, UA -      COLOR,UA yellow     CLARITY,UA clear        @RESULT(URINEMICROSCOPIC)@    @RESULT(URINECULTURE)@    Radiology       Review of Systems     Review of Systems   Constitutional: Negative for activity change, appetite change, chills, fatigue, fever and unexpected weight change  Intermittent fatigue   HENT: Negative for facial swelling  Eyes: Negative for discharge  Respiratory: Negative  Negative for cough and shortness of breath  Cardiovascular: Negative for chest pain and leg swelling  Gastrointestinal: Negative  Negative for abdominal distention, abdominal pain, constipation, diarrhea, nausea and vomiting  Endocrine: Negative  Genitourinary: Positive for frequency and urgency  Negative for decreased urine volume, difficulty urinating, dysuria, enuresis, flank pain, genital sores and hematuria  Musculoskeletal: Positive for arthralgias  Negative for back pain and myalgias  Skin: Negative for pallor and rash  Allergic/Immunologic: Negative  Negative for immunocompromised state  Neurological: Negative for facial asymmetry and speech difficulty  Psychiatric/Behavioral: Negative for agitation and confusion         Allergies     No Known Allergies    Physical Exam     Physical Exam  Vitals reviewed  Constitutional:       General: He is not in acute distress  Appearance: Normal appearance  He is normal weight  He is not ill-appearing, toxic-appearing or diaphoretic  HENT:      Head: Normocephalic and atraumatic  Eyes:      General: No scleral icterus  Cardiovascular:      Rate and Rhythm: Normal rate  Pulmonary:      Effort: Pulmonary effort is normal  No respiratory distress  Abdominal:      General: Abdomen is flat  There is no distension  Palpations: Abdomen is soft  Tenderness: There is no abdominal tenderness  There is no guarding or rebound  Genitourinary:     Comments: Prostate smooth nontender without appreciable nodules or indurations  Musculoskeletal:         General: No swelling  Cervical back: Normal range of motion  Skin:     General: Skin is warm and dry  Coloration: Skin is not jaundiced or pale  Findings: No rash  Neurological:      General: No focal deficit present  Mental Status: He is alert and oriented to person, place, and time  Gait: Gait normal    Psychiatric:         Mood and Affect: Mood normal          Behavior: Behavior normal          Thought Content:  Thought content normal          Judgment: Judgment normal          Vital Signs     Vitals:    11/10/22 0910   BP: 138/76   BP Location: Right arm   Patient Position: Sitting   Cuff Size: Adult   Pulse: 80   Weight: 101 kg (223 lb)   Height: 6' 1" (1 854 m)       Current Medications       Current Outpatient Medications:   •  piroxicam (FELDENE) 20 mg capsule, Take 1 capsule (20 mg total) by mouth daily as needed (Pain), Disp: 30 capsule, Rfl: 3  •  sodium chloride (DIANA 128) 5 % hypertonic ophthalmic ointment, Administer 1 drop to both eyes daily, Disp: , Rfl:   •  tamsulosin (FLOMAX) 0 4 mg, Take 1 capsule (0 4 mg total) by mouth daily with dinner, Disp: 90 capsule, Rfl: 3  •  urea (CARMOL) 40 %, Apply topically daily, Disp: 227 g, Rfl: 2  •  neomycin-bacitracin-polymyxin b (NEOSPORIN) ointment, Apply topically 2 (two) times a day for 7 days, Disp: 15 g, Rfl: 0    Active Problems     Patient Active Problem List   Diagnosis   • BPH without urinary obstruction   • Mild vitamin D deficiency   • Vertigo   • Tinnitus of left ear   • Meningioma (HCC)   • Cervicalgia   • Left knee pain   • Pain in both lower extremities   • Varicose veins of both legs with edema   • Lumbar radiculopathy   • Spinal stenosis of lumbar region with neurogenic claudication   • Primary osteoarthritis of left knee   • Prostate cancer screening   • Dermatitis   • Microhematuria   • Toe pain, right   • Elevated PSA       Past Medical History     Past Medical History:   Diagnosis Date   • Knee pain    • Spinal stenosis    • Varicose veins of lower extremity        Surgical History     Past Surgical History:   Procedure Laterality Date   • COLONOSCOPY     • EXCISIONAL HEMORRHOIDECTOMY     • KNEE SURGERY     • LAMINECTOMY      lumbar       Family History     Family History   Problem Relation Age of Onset   • No Known Problems Mother    • Skin cancer Sister        Social History     Social History     Social History     Tobacco Use   Smoking Status Never Smoker   Smokeless Tobacco Never Used       Past Surgical History:   Procedure Laterality Date   • COLONOSCOPY     • EXCISIONAL HEMORRHOIDECTOMY     • KNEE SURGERY     • LAMINECTOMY      lumbar         The following portions of the patient's history were reviewed and updated as appropriate: allergies, current medications, past family history, past medical history, past social history, past surgical history and problem list    Please note :  Voice dictation software has been used to create this document  There may be inadvertent transcription errors      [id number] [address]

## 2022-11-11 NOTE — RESULT ENCOUNTER NOTE
Please let patient know his urine testing was unremarkable    No signs of microscopic blood or infection

## 2022-11-28 ENCOUNTER — LAB (OUTPATIENT)
Dept: LAB | Facility: MEDICAL CENTER | Age: 67
End: 2022-11-28

## 2022-11-28 DIAGNOSIS — R97.20 ELEVATED PSA: ICD-10-CM

## 2022-11-28 LAB — PSA SERPL-MCNC: 3.6 NG/ML (ref 0–4)

## 2022-11-29 ENCOUNTER — TELEPHONE (OUTPATIENT)
Dept: UROLOGY | Facility: CLINIC | Age: 67
End: 2022-11-29

## 2022-11-29 DIAGNOSIS — R97.20 ELEVATED PSA: Primary | ICD-10-CM

## 2022-11-29 NOTE — TELEPHONE ENCOUNTER
Contacted patient and made him aware PSA returned to baseline  Advised patient to have PSA repeated in 1 year and follow up as scheduled

## 2022-11-29 NOTE — TELEPHONE ENCOUNTER
----- Message from [address] [place] [address] sent at 11/29/2022  9:09 AM EST -----  Please let patient know his PSA returned to his baseline at 3 6    We will recheck him again in 1 year

## 2022-11-29 NOTE — RESULT ENCOUNTER NOTE
Please let patient know his PSA returned to his baseline at 3 6    We will recheck him again in 1 year

## 2023-02-27 ENCOUNTER — OFFICE VISIT (OUTPATIENT)
Dept: FAMILY MEDICINE CLINIC | Facility: CLINIC | Age: 68
End: 2023-02-27

## 2023-02-27 VITALS
OXYGEN SATURATION: 98 % | HEART RATE: 99 BPM | HEIGHT: 73 IN | WEIGHT: 224.6 LBS | SYSTOLIC BLOOD PRESSURE: 124 MMHG | TEMPERATURE: 98.2 F | DIASTOLIC BLOOD PRESSURE: 66 MMHG | BODY MASS INDEX: 29.77 KG/M2

## 2023-02-27 DIAGNOSIS — I83.893 VARICOSE VEINS OF BOTH LEGS WITH EDEMA: ICD-10-CM

## 2023-02-27 DIAGNOSIS — B35.1 ONYCHOMYCOSIS: Primary | ICD-10-CM

## 2023-02-27 RX ORDER — BETAMETHASONE DIPROPIONATE 0.5 MG/G
CREAM TOPICAL
COMMUNITY
Start: 2023-02-08

## 2023-02-27 NOTE — PROGRESS NOTES
Assessment/Plan:       Diagnoses and all orders for this visit:    Onychomycosis    Other orders  -     betamethasone, augmented, (DIPROLENE-AF) 0 05 % cream; APPLY TO LEFT LOWER LEG TWICE DAILY X1WK, DAILY X1WK  THEN USE 2 X PER WK  UNTIL SEEN BACK IN OFFICE            Subjective:        Patient ID: Efren Browne is a 69 y o  male  Pt  Is here with swelling left leg with varicose veins  Pt is seeing podiatry  Pt  Does have compression socks  Pt also with rash left leg  Pt  Is on betamethasone cream          The following portions of the patient's history were reviewed and updated as appropriate: allergies, current medications, past family history, past medical history, past social history, past surgical history and problem list       Review of Systems   Constitutional: Negative  HENT: Negative  Eyes: Negative  Respiratory: Negative  Cardiovascular: Positive for leg swelling  Gastrointestinal: Negative  Endocrine: Negative  Genitourinary: Negative  Musculoskeletal: Negative  Skin: Positive for color change and rash  Allergic/Immunologic: Negative  Neurological: Negative  Hematological: Negative  Psychiatric/Behavioral: Negative  Objective:      BMI Counseling: Body mass index is 29 63 kg/m²  The BMI is above normal  Nutrition recommendations include consuming healthier snacks  Exercise recommendations include exercising 3-5 times per week  Rationale for BMI follow-up plan is due to patient being overweight or obese  /66 (BP Location: Right arm, Patient Position: Sitting, Cuff Size: Standard)   Pulse 99   Temp 98 2 °F (36 8 °C) (Temporal)   Ht 6' 1" (1 854 m)   Wt 102 kg (224 lb 9 6 oz)   SpO2 98%   BMI 29 63 kg/m²          Physical Exam  Constitutional:       Appearance: Normal appearance  Cardiovascular:      Rate and Rhythm: Normal rate and regular rhythm  Pulses: Normal pulses  Heart sounds: Normal heart sounds  Pulmonary:      Effort: Pulmonary effort is normal       Breath sounds: Normal breath sounds  Musculoskeletal:      Right lower leg: Edema present  Left lower leg: Edema present  Skin:     Findings: Rash present  Comments: Varicose veins b/l   Neurological:      Mental Status: He is alert

## 2023-05-09 ENCOUNTER — APPOINTMENT (OUTPATIENT)
Dept: LAB | Facility: MEDICAL CENTER | Age: 68
End: 2023-05-09

## 2023-05-09 DIAGNOSIS — R97.20 ELEVATED PSA: ICD-10-CM

## 2023-05-09 LAB — PSA SERPL-MCNC: 4 NG/ML (ref 0–4)

## 2023-05-11 ENCOUNTER — OFFICE VISIT (OUTPATIENT)
Dept: UROLOGY | Facility: CLINIC | Age: 68
End: 2023-05-11

## 2023-05-11 VITALS
SYSTOLIC BLOOD PRESSURE: 128 MMHG | DIASTOLIC BLOOD PRESSURE: 84 MMHG | HEART RATE: 70 BPM | OXYGEN SATURATION: 96 % | BODY MASS INDEX: 29.82 KG/M2 | WEIGHT: 225 LBS | HEIGHT: 73 IN

## 2023-05-11 DIAGNOSIS — Z12.5 PROSTATE CANCER SCREENING: ICD-10-CM

## 2023-05-11 DIAGNOSIS — R31.29 MICROHEMATURIA: Primary | ICD-10-CM

## 2023-05-11 DIAGNOSIS — R97.20 ELEVATED PSA: ICD-10-CM

## 2023-05-11 DIAGNOSIS — D32.9 MENINGIOMA (HCC): ICD-10-CM

## 2023-05-11 DIAGNOSIS — N40.0 BPH WITHOUT URINARY OBSTRUCTION: ICD-10-CM

## 2023-05-11 LAB
BACTERIA UR QL AUTO: NORMAL /HPF
BILIRUB UR QL STRIP: NEGATIVE
CLARITY UR: CLEAR
COLOR UR: NORMAL
GLUCOSE UR STRIP-MCNC: NEGATIVE MG/DL
HGB UR QL STRIP.AUTO: NEGATIVE
KETONES UR STRIP-MCNC: NEGATIVE MG/DL
LEUKOCYTE ESTERASE UR QL STRIP: NEGATIVE
NITRITE UR QL STRIP: NEGATIVE
NON-SQ EPI CELLS URNS QL MICRO: NORMAL /HPF
PH UR STRIP.AUTO: 6.5 [PH]
PROT UR STRIP-MCNC: NEGATIVE MG/DL
RBC #/AREA URNS AUTO: NORMAL /HPF
SP GR UR STRIP.AUTO: 1.01 (ref 1–1.03)
UROBILINOGEN UR STRIP-ACNC: <2 MG/DL
WBC #/AREA URNS AUTO: NORMAL /HPF

## 2023-05-11 RX ORDER — FINASTERIDE 5 MG/1
5 TABLET, FILM COATED ORAL DAILY
Qty: 30 TABLET | Refills: 3 | Status: CANCELLED | OUTPATIENT
Start: 2023-05-11

## 2023-05-11 NOTE — PATIENT INSTRUCTIONS
BLADDER HEALTH    WHAT IS CONSIDERED NORMAL? The average bladder can hold about 2 cups of urine before it needs to be emptied  The normal range of voiding urine is 6 to 8 times during a 24 hour period  As we get older, our bladder capacity can get smaller and we may need to pass urine more frequently but usually not more than every 2 hours  Urine should flow easily without discomfort in a good, steady stream until the bladder is empty  No pushing or straining is necessary to empty the bladder  An urge is a signal that you feel as the bladder stretches to fill with urine  Urges can be felt even if the bladder is not full  Urges are not commands to go to the toilet, merely a signal and can be controlled  WHAT ARE GOOD BLADDER HABITS? Take your time when emptying your bladder  Don’t strain or push to empty your bladder  Make sure you empty your bladder completely each time you pass urine  Do not rush the process  Consistently ignoring the urge to go (waiting more than 4 hours between toileting) or urinating too infrequently may be convenient but not healthy for your bladder  Avoid going to the toilet “just in case” or more often than every 2 hours  It is usually not necessary to go when you feel the first urge  Try to go only when your bladder is full  Urgency and frequency of urination can be improved by retraining the bladder and spacing your fluid intake throughout the day  Practice good toilet habits  Don’t let your bladder control your life  TIPS TO MAINTAIN GOOD BLADDER HABITS  Maintain a good fluid intake  Depending on your body size and environment, drink 6 -8 cups (8 oz each) of fluid per day unless otherwise advised by your doctor  Not enough fluid creates a foul odor and dark color of the urine  Limit the amount of caffeine (coffee, cola, chocolate or tea) and citrus foods that you consume as these foods can be associated with increased sensation of urinary urgency and frequency  "    Limit the amount of alcohol you drink  Alcohol increases urine production and makes it difficult for the brain to coordinate bladder control  Avoid constipation by maintaining a balanced diet of dietary fiber  Cigarette smoking is also irritating to the bladder surface and is associated with bladder cancer  In addition, the coughing associated with smoking may lead to increased incontinent episodes because of the increased pressure  HOW DIET CAN AFFECT YOUR BLADDER  Although there is no particular \"diet\" that can cure bladder control, there are certain dietary suggestions you can use to help control the problem  There are 2 points to consider when evaluating how your habits and diet may affect your bladder:    Foods and fluids can irritate the bladder  Some foods and beverages are thought to contribute to bladder leakage and irritability  However their effect on the bladder is not completely understood and you may want to see if eliminating one or all of these items improves your bladder control  If you are unable to give them up completely, it is recommended that you use the following items in moderation:  Acidic beverages and foods (orange juice, grapefruit juice, lemonade etc)  Alcoholic beverages  Vinegar  Coffee (regular and decaf)  Tea (regular and decaf)  Caffeinated beverages  Carbonated beverages          Drinking enough and the right kinds of fluids  Many people with bladder control issues decrease their intake of liquids in hope that they will need to urinate less frequently or have less urinary leakage  You should not restrict fluids to control your bladder  While a decrease in liquid intake does result in a decrease in the volume of urine, the smaller amount of urine may be more highly concentrated  Highly concentrated, dark yellow urine is irritating to the bladder surface and may actually cause you to go to the bathroom more frequently        It also encourages the growth " of bacteria, which may lead to infections resulting in incontinence  Substitutions for Bladder Irritants: water is always the best beverage choice  Grape and apple juice are thirst quenchers are good selections and are not as irritating to the bladder    Low acid fruits:  Pears, apricots, papaya, watermelon  For coffee drinkers: KAVA®, Postum®, Consuelo®, Kaffree Dana®  For tea drinkers:  non-citrus or herbal and sun brewed tea

## 2023-05-11 NOTE — ASSESSMENT & PLAN NOTE
• psa 4 0, was 5 3  • If elevated recommend prostate biopsy +/-MRI of the prostate  • Follow up 1 YEAR

## 2023-05-11 NOTE — PROGRESS NOTES
Assessment and plan:     BPH without urinary obstruction  • AUA 17, was 22  • continue tamsulosin 0 4mg daily  • He is not interested in additional medication or increasing dose of flomax  • Avoid bladder irritants  • Follow up 1 year    Microhematuria  • Send urine microscopic  • Hematuria work up if true microscopic hematuria  • Follow up 1 year    Prostate cancer screening  • psa 4 0, was 5 3  • If elevated recommend prostate biopsy +/-MRI of the prostate  • Follow up [address]    History of Present Illness     Jamal Munoz is a 69 y o  male with history of BPH and urinary symptoms   He saw Dr Mookie Ramirez on 11/19/2020   He followed up with me on 05/26/2021 at which point he was planning to resume his tamsulosin  Blanchie Eisenmenger has not been taking this however  Rajeshnchie Eisenmenger is not interested in a cysto TRUS for evaluation of his bladder outlet obstruction  Fayerochelle Maria Gshaniqua had started and stopped tamsulosin twice and was concerned maybe there was side effects including fatigue   He did notice improvement when he was taking the medication    At his previous visit he was complaining of significant feeling of incomplete bladder emptying, urgency, nocturia   he notices improvement of urinary symptoms when he takes the flomax  he drinks wine or beer daily  He takes the flomax intermittently  He forgets      denies family history of prostate cancer     Component PSA, Total   Latest Ref Rng & Units 0 0 - 4 0 ng/mL   5/11/2019 3 7   5/27/2020 3 8   11/13/2020 3 7   11/3/2021 4 2 (H)   11/8/2022 5 3 (H)   11/28/2022 3 6   5/9/2023 4 0     His in office urine dip was positive for blood at his previous visit however the formal urine microscopic did not have any blood   He denies any gross hematuria    Laboratory     Lab Results   Component Value Date    BUN 11 05/10/2022    CREATININE 0 83 05/10/2022       No components found for: GFR    Lab Results   Component Value Date    CALCIUM 8 9 05/10/2022    K 4 6 05/10/2022    CO2 29 05/10/2022     05/10/2022       Lab Results   Component Value Date    WBC 5 37 05/10/2022    HGB 14 4 05/10/2022    HCT 44 1 05/10/2022    MCV 92 05/10/2022     05/10/2022       Lab Results   Component Value Date    PSA 4 0 05/09/2023    PSA 3 6 11/28/2022    PSA 5 3 (H) 11/08/2022       No results found for this or any previous visit (from the past 1 hour(s))  @RESULT(URINEMICROSCOPIC)@    @RESULT(URINECULTURE)@    Radiology       Review of Systems     Review of Systems   Constitutional: Negative for activity change, appetite change, chills, fatigue, fever and unexpected weight change  HENT: Negative for facial swelling  Eyes: Negative for discharge  Respiratory: Negative  Negative for cough and shortness of breath  Cardiovascular: Negative for chest pain and leg swelling  Gastrointestinal: Negative  Negative for abdominal distention, abdominal pain, constipation, diarrhea, nausea and vomiting  Endocrine: Negative  Genitourinary: Positive for frequency  Negative for decreased urine volume, difficulty urinating, dysuria, enuresis, flank pain, genital sores, hematuria and urgency  Feeling of incomplete bladder emptying, weak stream   Musculoskeletal: Positive for arthralgias  Negative for back pain and myalgias  Skin: Negative for pallor and rash  Allergic/Immunologic: Negative  Negative for immunocompromised state  Neurological: Negative for facial asymmetry and speech difficulty  Psychiatric/Behavioral: Negative for agitation and confusion  Allergies     No Known Allergies    Physical Exam     Physical Exam  Vitals reviewed  Constitutional:       General: He is not in acute distress  Appearance: Normal appearance  He is normal weight  He is not ill-appearing, toxic-appearing or diaphoretic  HENT:      Head: Normocephalic and atraumatic  Eyes:      General: No scleral icterus  Cardiovascular:      Rate and Rhythm: Normal rate     Pulmonary: "Effort: Pulmonary effort is normal  No respiratory distress  Abdominal:      General: Abdomen is flat  There is no distension  Musculoskeletal:         General: No swelling  Cervical back: Normal range of motion  Skin:     General: Skin is warm and dry  Coloration: Skin is not jaundiced or pale  Findings: No rash  Neurological:      General: No focal deficit present  Mental Status: He is alert and oriented to person, place, and time  Gait: Gait normal    Psychiatric:         Mood and Affect: Mood normal          Behavior: Behavior normal          Thought Content: Thought content normal          Judgment: Judgment normal          Vital Signs     Vitals:    05/11/23 0920   BP: 128/84   BP Location: Left arm   Patient Position: Sitting   Cuff Size: Adult   Pulse: 70   SpO2: 96%   Weight: 102 kg (225 lb)   Height: 6' 1\" (1 854 m)       Current Medications       Current Outpatient Medications:   •  betamethasone, augmented, (DIPROLENE-AF) 0 05 % cream, APPLY TO LEFT LOWER LEG TWICE DAILY X1WK, DAILY X1WK  THEN USE 2 X PER WK   UNTIL SEEN BACK IN OFFICE, Disp: , Rfl:   •  ciclopirox (PENLAC) 8 % solution, APPLY TO AFFECTED AREA EVERY DAY, Disp: , Rfl:   •  piroxicam (FELDENE) 20 mg capsule, Take 1 capsule (20 mg total) by mouth daily as needed (Pain), Disp: 30 capsule, Rfl: 3  •  sodium chloride (DIANA 128) 5 % hypertonic ophthalmic ointment, Administer 1 drop to both eyes daily, Disp: , Rfl:   •  urea (CARMOL) 40 %, Apply topically daily, Disp: 227 g, Rfl: 2  •  neomycin-bacitracin-polymyxin b (NEOSPORIN) ointment, Apply topically 2 (two) times a day for 7 days, Disp: 15 g, Rfl: 0  •  tamsulosin (FLOMAX) 0 4 mg, Take 1 capsule (0 4 mg total) by mouth daily with dinner, Disp: 90 capsule, Rfl: 3    Active Problems     Patient Active Problem List   Diagnosis   • BPH without urinary obstruction   • Mild vitamin D deficiency   • Vertigo   • Tinnitus of left ear   • Meningioma (HCC)   • " Cervicalgia   • Left knee pain   • Pain in both lower extremities   • Varicose veins of both legs with edema   • Lumbar radiculopathy   • Spinal stenosis of lumbar region with neurogenic claudication   • Primary osteoarthritis of left knee   • Prostate cancer screening   • Dermatitis   • Microhematuria   • Toe pain, right   • Elevated PSA   • Onychomycosis       Past Medical History     Past Medical History:   Diagnosis Date   • Knee pain    • Spinal stenosis    • Varicose veins of lower extremity        Surgical History     Past Surgical History:   Procedure Laterality Date   • COLONOSCOPY     • EXCISIONAL HEMORRHOIDECTOMY     • KNEE SURGERY     • LAMINECTOMY      lumbar       Family History     Family History   Problem Relation Age of Onset   • No Known Problems Mother    • Skin cancer Sister        Social History     Social History     Social History     Tobacco Use   Smoking Status Never   Smokeless Tobacco Never       Past Surgical History:   Procedure Laterality Date   • COLONOSCOPY     • EXCISIONAL HEMORRHOIDECTOMY     • KNEE SURGERY     • LAMINECTOMY      lumbar         The following portions of the patient's history were reviewed and updated as appropriate: allergies, current medications, past family history, past medical history, past social history, past surgical history and problem list    Please note :  Voice dictation software has been used to create this document  There may be inadvertent transcription errors      [id number] [address]

## 2023-05-11 NOTE — ASSESSMENT & PLAN NOTE
• AUA 19, was 22  • continue tamsulosin 0 4mg daily  • He is not interested in additional medication or increasing dose of flomax  • Avoid bladder irritants  • Follow up 1 year

## 2023-08-14 ENCOUNTER — OFFICE VISIT (OUTPATIENT)
Dept: FAMILY MEDICINE CLINIC | Facility: CLINIC | Age: 68
End: 2023-08-14
Payer: COMMERCIAL

## 2023-08-14 VITALS
TEMPERATURE: 98.7 F | SYSTOLIC BLOOD PRESSURE: 128 MMHG | HEART RATE: 81 BPM | OXYGEN SATURATION: 97 % | HEIGHT: 73 IN | WEIGHT: 216 LBS | BODY MASS INDEX: 28.63 KG/M2 | DIASTOLIC BLOOD PRESSURE: 68 MMHG

## 2023-08-14 DIAGNOSIS — D49.2 SKIN NEOPLASM: Primary | ICD-10-CM

## 2023-08-14 PROCEDURE — 99213 OFFICE O/P EST LOW 20 MIN: CPT | Performed by: FAMILY MEDICINE

## 2023-08-14 NOTE — PROGRESS NOTES
Assessment/Plan: Patient will return for removal of lesion on right calf. Diagnoses and all orders for this visit:    Skin neoplasm            Subjective:        Patient ID: Arely Pat is a 77 y.o. male. Patient is here with skin lesion on right calf over the past 3 months. No significant change in size or bleeding or itching associated with this. No pain noted. The following portions of the patient's history were reviewed and updated as appropriate: allergies, current medications, past family history, past medical history, past social history, past surgical history and problem list.      Review of Systems   Constitutional: Negative. HENT: Negative. Eyes: Negative. Respiratory: Negative. Cardiovascular: Negative. Gastrointestinal: Negative. Endocrine: Negative. Genitourinary: Negative. Musculoskeletal: Negative. Skin: Positive for color change. Allergic/Immunologic: Negative. Neurological: Negative. Hematological: Negative. Psychiatric/Behavioral: Negative. Objective:        Depression Screening and Follow-up Plan: Clincally patient does not have depression. No treatment is required. /68 (BP Location: Right arm, Patient Position: Sitting, Cuff Size: Standard)   Pulse 81   Temp 98.7 °F (37.1 °C) (Temporal)   Ht 6' 1" (1.854 m)   Wt 98 kg (216 lb)   SpO2 97%   BMI 28.50 kg/m²          Physical Exam  Vitals reviewed. Constitutional:       General: He is not in acute distress. Appearance: Normal appearance. He is not ill-appearing, toxic-appearing or diaphoretic. Skin:     Comments: Raised irregular lesion on right calf   Neurological:      Mental Status: He is alert.

## 2023-09-21 ENCOUNTER — OFFICE VISIT (OUTPATIENT)
Dept: FAMILY MEDICINE CLINIC | Facility: CLINIC | Age: 68
End: 2023-09-21
Payer: COMMERCIAL

## 2023-09-21 VITALS — WEIGHT: 216 LBS | HEIGHT: 73 IN | BODY MASS INDEX: 28.63 KG/M2

## 2023-09-21 DIAGNOSIS — D49.2 SKIN NEOPLASM: Primary | ICD-10-CM

## 2023-09-21 PROCEDURE — 88341 IMHCHEM/IMCYTCHM EA ADD ANTB: CPT | Performed by: PATHOLOGY

## 2023-09-21 PROCEDURE — 11301 SHAVE SKIN LESION 0.6-1.0 CM: CPT | Performed by: FAMILY MEDICINE

## 2023-09-21 PROCEDURE — 88305 TISSUE EXAM BY PATHOLOGIST: CPT | Performed by: PATHOLOGY

## 2023-09-21 PROCEDURE — 88342 IMHCHEM/IMCYTCHM 1ST ANTB: CPT | Performed by: PATHOLOGY

## 2023-09-21 PROCEDURE — 88313 SPECIAL STAINS GROUP 2: CPT | Performed by: PATHOLOGY

## 2023-09-21 NOTE — PROGRESS NOTES
Assessment/Plan: See procedure note. Patient will follow-up in 2 weeks. Patient keep area clean and dry and use bacitracin on daily basis. Diagnoses and all orders for this visit:    Skin neoplasm            Subjective:        Patient ID: Berta Vo is a 77 y.o. male. Patient is here for removal of skin lesion from the right posterior calf which is increased in size and changed shape. The following portions of the patient's history were reviewed and updated as appropriate: allergies, current medications, past family history, past medical history, past social history, past surgical history and problem list.      Review of Systems   Skin: Positive for color change. Objective:      BMI Counseling: Body mass index is 28.5 kg/m². The BMI is above normal. Nutrition recommendations include encouraging healthy choices of fruits and vegetables. Exercise recommendations include moderate physical activity 150 minutes/week. Rationale for BMI follow-up plan is due to patient being overweight or obese. Ht 6' 1" (1.854 m)   Wt 98 kg (216 lb)   BMI 28.50 kg/m²          Physical Exam  Vitals and nursing note reviewed. Constitutional:       General: He is not in acute distress. Appearance: Normal appearance. He is not ill-appearing, toxic-appearing or diaphoretic. HENT:      Head: Normocephalic and atraumatic. Eyes:      General: No scleral icterus. Right eye: No discharge. Left eye: No discharge. Neck:      Vascular: No carotid bruit. Cardiovascular:      Rate and Rhythm: Normal rate and regular rhythm. Pulses: Normal pulses. Heart sounds: Normal heart sounds. No murmur heard. No friction rub. No gallop. Pulmonary:      Effort: Pulmonary effort is normal. No respiratory distress. Breath sounds: Normal breath sounds. No stridor. No wheezing, rhonchi or rales. Chest:      Chest wall: No tenderness.    Abdominal:      Palpations: Abdomen is soft. Musculoskeletal:         General: No swelling, tenderness, deformity or signs of injury. Normal range of motion. Cervical back: Normal range of motion and neck supple. No rigidity. No muscular tenderness. Right lower leg: No edema. Left lower leg: No edema. Lymphadenopathy:      Cervical: No cervical adenopathy. Skin:     General: Skin is warm and dry. Capillary Refill: Capillary refill takes less than 2 seconds. Coloration: Skin is not jaundiced. Findings: Lesion present. No bruising, erythema or rash. Comments: Irregular raised skin lesion right posterior calf measuring 7 mm. Neurological:      Mental Status: He is alert and oriented to person, place, and time. Mental status is at baseline. Cranial Nerves: No cranial nerve deficit. Sensory: No sensory deficit. Motor: No weakness. Coordination: Coordination normal.      Gait: Gait normal.   Psychiatric:         Mood and Affect: Mood normal.         Behavior: Behavior normal.         Thought Content: Thought content normal.         Judgment: Judgment normal.       Shave lesion    Date/Time: 9/21/2023 1:15 PM    Performed by: Favio Lorenzana DO  Authorized by: Favio Lorenzana DO  Universal Protocol:  Consent: Verbal consent obtained. Written consent obtained. Risks and benefits: risks, benefits and alternatives were discussed  Consent given by: patient  Time out: Immediately prior to procedure a "time out" was called to verify the correct patient, procedure, equipment, support staff and site/side marked as required.   Patient understanding: patient states understanding of the procedure being performed  Patient consent: the patient's understanding of the procedure matches consent given  Procedure consent: procedure consent matches procedure scheduled  Relevant documents: relevant documents present and verified  Patient identity confirmed: verbally with patient      Number of Lesions: 1  Lesion 1:     Body area: lower extremity    Lower extremity location: R lower leg (Calf)    Initial size (mm): 7    Final defect size (mm): 8    Malignancy: malignancy unknown      Destruction method: shave removal      Comments:  Informed consent obtained. Betadine and alcohol use. 2 cc of lidocaine with epinephrine used. Shave excision done at this time. Electrodesiccation done to the base. Bacitracin and dry sterile dressing applied. Specimen sent to pathology. Patient tolerated procedure well.

## 2023-09-28 PROCEDURE — 88305 TISSUE EXAM BY PATHOLOGIST: CPT | Performed by: PATHOLOGY

## 2023-09-28 PROCEDURE — 88342 IMHCHEM/IMCYTCHM 1ST ANTB: CPT | Performed by: PATHOLOGY

## 2023-09-28 PROCEDURE — 88341 IMHCHEM/IMCYTCHM EA ADD ANTB: CPT | Performed by: PATHOLOGY

## 2023-09-28 PROCEDURE — 88313 SPECIAL STAINS GROUP 2: CPT | Performed by: PATHOLOGY

## 2023-09-29 ENCOUNTER — RA CDI HCC (OUTPATIENT)
Dept: OTHER | Facility: HOSPITAL | Age: 68
End: 2023-09-29

## 2023-09-29 NOTE — PROGRESS NOTES
[address] [facility] coding opportunities       Chart reviewed, no opportunity found: [address]        Patients Insurance     Medicare Insurance: [employer]

## 2023-10-03 ENCOUNTER — TELEPHONE (OUTPATIENT)
Dept: FAMILY MEDICINE CLINIC | Facility: CLINIC | Age: 68
End: 2023-10-03

## 2023-10-16 DIAGNOSIS — N40.0 BPH WITHOUT URINARY OBSTRUCTION: ICD-10-CM

## 2023-10-16 RX ORDER — TAMSULOSIN HYDROCHLORIDE 0.4 MG/1
0.4 CAPSULE ORAL
Qty: 90 CAPSULE | Refills: 1 | Status: SHIPPED | OUTPATIENT
Start: 2023-10-16 | End: 2024-04-13

## 2023-11-07 ENCOUNTER — TELEPHONE (OUTPATIENT)
Dept: FAMILY MEDICINE CLINIC | Facility: CLINIC | Age: 68
End: 2023-11-07

## 2023-11-07 NOTE — TELEPHONE ENCOUNTER
Patient had his last booster on 9/27/22 and he is wondering if he can get his next booster or should he wait.

## 2023-11-14 ENCOUNTER — TELEPHONE (OUTPATIENT)
Age: 68
End: 2023-11-14

## 2023-11-14 NOTE — TELEPHONE ENCOUNTER
He is stating the office told him to get a PSA before his next visit. I do not see that in the appt notes or on his chart. He is now requesting to speak to the office.     Romario Anthony  [phone]

## 2023-12-01 ENCOUNTER — OFFICE VISIT (OUTPATIENT)
Dept: FAMILY MEDICINE CLINIC | Facility: CLINIC | Age: 68
End: 2023-12-01
Payer: COMMERCIAL

## 2023-12-01 VITALS
TEMPERATURE: 96.8 F | HEART RATE: 91 BPM | OXYGEN SATURATION: 98 % | HEIGHT: 73 IN | WEIGHT: 213 LBS | SYSTOLIC BLOOD PRESSURE: 120 MMHG | DIASTOLIC BLOOD PRESSURE: 70 MMHG | BODY MASS INDEX: 28.23 KG/M2

## 2023-12-01 DIAGNOSIS — Z13.1 SCREENING FOR DIABETES MELLITUS: ICD-10-CM

## 2023-12-01 DIAGNOSIS — Z00.00 MEDICARE ANNUAL WELLNESS VISIT, SUBSEQUENT: Primary | ICD-10-CM

## 2023-12-01 DIAGNOSIS — Z13.6 SCREENING FOR CARDIOVASCULAR CONDITION: ICD-10-CM

## 2023-12-01 DIAGNOSIS — Z12.5 SCREENING FOR PROSTATE CANCER: ICD-10-CM

## 2023-12-01 DIAGNOSIS — Z11.59 NEED FOR HEPATITIS C SCREENING TEST: ICD-10-CM

## 2023-12-01 DIAGNOSIS — Z23 ENCOUNTER FOR IMMUNIZATION: ICD-10-CM

## 2023-12-01 PROCEDURE — G0008 ADMIN INFLUENZA VIRUS VAC: HCPCS

## 2023-12-01 PROCEDURE — G0439 PPPS, SUBSEQ VISIT: HCPCS | Performed by: FAMILY MEDICINE

## 2023-12-01 PROCEDURE — 90662 IIV NO PRSV INCREASED AG IM: CPT

## 2023-12-01 NOTE — PROGRESS NOTES
Chel Ivy is here for his Subsequent Wellness visit. Health Risk Assessment:   Patient rates overall health as good. Patient feels that their physical health rating is same. Patient is satisfied with their life. Eyesight was rated as slightly worse. Hearing was rated as slightly worse. Patient feels that their emotional and mental health rating is same. Patients states they are never, rarely angry. Patient states they are sometimes unusually tired/fatigued. Pain experienced in the last 7 days has been some. Patient's pain rating has been 2/10. Patient states that he has experienced no weight loss or gain in last 6 months. Depression Screening:   PHQ-2 Score: 2      Fall Risk Screening: In the past year, patient has experienced: history of falling in past year    Number of falls: 1  Injured during fall?: No    Feels unsteady when standing or walking?: No    Worried about falling?: No      Home Safety:  Patient does not have trouble with stairs inside or outside of their home. Patient has working smoke alarms and has working carbon monoxide detector. Medications:   Patient is not currently taking any over-the-counter supplements. Patient is able to manage medications. Activities of Daily Living (ADLs)/Instrumental Activities of Daily Living (IADLs):   Walk and transfer into and out of bed and chair?: Yes  Dress and groom yourself?: Yes    Bathe or shower yourself?: Yes    Feed yourself?  Yes  Do your laundry/housekeeping?: Yes  Manage your money, pay your bills and track your expenses?: Yes  Make your own meals?: Yes    Do your own shopping?: Yes    Previous Hospitalizations:   Any hospitalizations or ED visits within the last 12 months?: No      Advance Care Planning:   Living will: No    Durable POA for healthcare: No    Advanced directive: No    Advanced directive counseling given: No    Five wishes given: No    Patient declined ACP directive: No    End of Life Decisions reviewed with patient: No Cognitive Screening:   Provider or family/friend/caregiver concerned regarding cognition?: No    PREVENTIVE SCREENINGS      Cardiovascular Screening:    General: Risks and Benefits Discussed and Screening Current    Due for: Lipid Panel      Diabetes Screening:     General: Risks and Benefits Discussed and Screening Current    Due for: Blood Glucose      Colorectal Cancer Screening:     General: Screening Current      Prostate Cancer Screening:    General: Screening Current and Risks and Benefits Discussed    Due for: PSA      Osteoporosis Screening:    General: Risks and Benefits Discussed      Abdominal Aortic Aneurysm (AAA) Screening:    Risk factors include: age between 70-75 yo        General: Risks and Benefits Discussed and Screening Not Indicated      Lung Cancer Screening:     General: Screening Not Indicated and Risks and Benefits Discussed      Hepatitis C Screening:    General: Risks and Benefits Discussed    Hep C Screening Accepted: Yes      Other Counseling Topics:   Regular weightbearing exercise and calcium and vitamin D intake.

## 2023-12-01 NOTE — PATIENT INSTRUCTIONS
Medicare Preventive Visit Patient Instructions  Thank you for completing your Welcome to Medicare Visit or Medicare Annual Wellness Visit today. Your next wellness visit will be due in one year (12/1/2024). The screening/preventive services that you may require over the next 5-10 years are detailed below. Some tests may not apply to you based off risk factors and/or age. Screening tests ordered at today's visit but not completed yet may show as past due. Also, please note that scanned in results may not display below. Preventive Screenings:  Service Recommendations Previous Testing/Comments   Colorectal Cancer Screening  Colonoscopy    Fecal Occult Blood Test (FOBT)/Fecal Immunochemical Test (FIT)  Fecal DNA/Cologuard Test  Flexible Sigmoidoscopy Age: 43-73 years old   Colonoscopy: every 10 years (May be performed more frequently if at higher risk)  OR  FOBT/FIT: every 1 year  OR  Cologuard: every 3 years  OR  Sigmoidoscopy: every 5 years  Screening may be recommended earlier than age 39 if at higher risk for colorectal cancer. Also, an individualized decision between you and your healthcare provider will decide whether screening between the ages of 77-80 would be appropriate.  Colonoscopy: 10/20/2020  FOBT/FIT: Not on file  Cologuard: Not on file  Sigmoidoscopy: Not on file    Screening Current     Prostate Cancer Screening Individualized decision between patient and health care provider in men between ages of 53-66   Medicare will cover every 12 months beginning on the day after your 50th birthday PSA: 4.0 ng/mL     Screening Current     Hepatitis C Screening Once for adults born between [address] [place] [address]  More frequently in patients at high risk for Hepatitis C Hep C Antibody: Not on file        Diabetes Screening 1-2 times per year if you're at risk for diabetes or have pre-diabetes Fasting glucose: 91 mg/dL (5/10/2022)  A1C: No results in last 5 years (No results in last 5 years)      Cholesterol Screening Once every 5 years if you don't have a lipid disorder. May order more often based on risk factors. Lipid panel: Not on file         Other Preventive Screenings Covered by Medicare:  Abdominal Aortic Aneurysm (AAA) Screening: covered once if your at risk. You're considered to be at risk if you have a family history of AAA or a male between the age of 70-76 who smoking at least 100 cigarettes in your lifetime. Lung Cancer Screening: covers low dose CT scan once per year if you meet all of the following conditions: (1) Age 48-67; (2) No signs or symptoms of lung cancer; (3) Current smoker or have quit smoking within the last 15 years; (4) You have a tobacco smoking history of at least 20 pack years (packs per day x number of years you smoked); (5) You get a written order from a healthcare provider. Glaucoma Screening: covered annually if you're considered high risk: (1) You have diabetes OR (2) Family history of glaucoma OR (3)  aged 48 and older OR (3)  American aged 72 and older  Osteoporosis Screening: covered every 2 years if you meet one of the following conditions: (1) Have a vertebral abnormality; (2) On glucocorticoid therapy for more than 3 months; (3) Have primary hyperparathyroidism; (4) On osteoporosis medications and need to assess response to drug therapy. HIV Screening: covered annually if you're between the age of 14-79. Also covered annually if you are younger than 13 and older than 72 with risk factors for HIV infection. For pregnant patients, it is covered up to 3 times per pregnancy.     Immunizations:  Immunization Recommendations   Influenza Vaccine Annual influenza vaccination during flu season is recommended for all persons aged >= 6 months who do not have contraindications   Pneumococcal Vaccine   * Pneumococcal conjugate vaccine = PCV13 (Prevnar 13), PCV15 (Vaxneuvance), PCV20 (Prevnar 20)  * Pneumococcal polysaccharide vaccine = PPSV23 (Pneumovax) Adults 20-62 yo with certain risk factors or if 69+ yo  If never received any pneumonia vaccine: recommend Prevnar 20 (PCV20)  Give PCV20 if previously received 1 dose of PCV13 or PPSV23   Hepatitis B Vaccine 3 dose series if at intermediate or high risk (ex: diabetes, end stage renal disease, liver disease)   Respiratory syncytial virus (RSV) Vaccine - COVERED BY MEDICARE PART D  * RSVPreF3 (Arexvy) CDC recommends that adults 61years of age and older may receive a single dose of RSV vaccine using shared clinical decision-making (SCDM)   Tetanus (Td) Vaccine - COST NOT COVERED BY MEDICARE PART B Following completion of primary series, a booster dose should be given every 10 years to maintain immunity against tetanus. Td may also be given as tetanus wound prophylaxis. Tdap Vaccine - COST NOT COVERED BY MEDICARE PART B Recommended at least once for all adults. For pregnant patients, recommended with each pregnancy. Shingles Vaccine (Shingrix) - COST NOT COVERED BY MEDICARE PART B  2 shot series recommended in those 19 years and older who have or will have weakened immune systems or those 50 years and older     Health Maintenance Due:      Topic Date Due   • Colorectal Cancer Screening  10/20/2025   • Hepatitis C Screening  Discontinued     Immunizations Due:      Topic Date Due   • Pneumococcal Vaccine: 65+ Years (1 - PCV) Never done     Advance Directives   What are advance directives? Advance directives are legal documents that state your wishes and plans for medical care. These plans are made ahead of time in case you lose your ability to make decisions for yourself. Advance directives can apply to any medical decision, such as the treatments you want, and if you want to donate organs. What are the types of advance directives? There are many types of advance directives, and each state has rules about how to use them. You may choose a combination of any of the following:  Living will:   This is a written record of the treatment you want. You can also choose which treatments you do not want, which to limit, and which to stop at a certain time. This includes surgery, medicine, IV fluid, and tube feedings. Durable power of  for healthcare [facility]): This is a written record that states who you want to make healthcare choices for you when you are unable to make them for yourself. This person, called a proxy, is usually a family member or a friend. You may choose more than 1 proxy. Do not resuscitate (DNR) order:  A DNR order is used in case your heart stops beating or you stop breathing. It is a request not to have certain forms of treatment, such as CPR. A DNR order may be included in other types of advance directives. Medical directive: This covers the care that you want if you are in a coma, near death, or unable to make decisions for yourself. You can list the treatments you want for each condition. Treatment may include pain medicine, surgery, blood transfusions, dialysis, IV or tube feedings, and a ventilator (breathing machine). Values history: This document has questions about your views, beliefs, and how you feel and think about life. This information can help others choose the care that you would choose. Why are advance directives important? An advance directive helps you control your care. Although spoken wishes may be used, it is better to have your wishes written down. Spoken wishes can be misunderstood, or not followed. Treatments may be given even if you do not want them. An advance directive may make it easier for your family to make difficult choices about your care. Fall Prevention    Fall prevention  includes ways to make your home and other areas safer. It also includes ways you can move more carefully to prevent a fall. Health conditions that cause changes in your blood pressure, vision, or muscle strength and coordination may increase your risk for falls.  Medicines may also increase your risk for falls if they make you dizzy, weak, or sleepy. Fall prevention tips:   Stand or sit up slowly. Use assistive devices as directed. Wear shoes that fit well and have soles that . Wear a personal alarm. Stay active. Manage your medical conditions. Home Safety Tips:  Add items to prevent falls in the bathroom. Keep paths clear. Install bright lights in your home. Keep items you use often on shelves within reach. Paint or place reflective tape on the edges of your stairs. Weight Management   Why it is important to manage your weight:  Being overweight increases your risk of health conditions such as heart disease, high blood pressure, type 2 diabetes, and certain types of cancer. It can also increase your risk for osteoarthritis, sleep apnea, and other respiratory problems. Aim for a slow, steady weight loss. Even a small amount of weight loss can lower your risk of health problems. How to lose weight safely:  A safe and healthy way to lose weight is to eat fewer calories and get regular exercise. You can lose up about 1 pound a week by decreasing the number of calories you eat by 500 calories each day. Healthy meal plan for weight management:  A healthy meal plan includes a variety of foods, contains fewer calories, and helps you stay healthy. A healthy meal plan includes the following:  Eat whole-grain foods more often. A healthy meal plan should contain fiber. Fiber is the part of grains, fruits, and vegetables that is not broken down by your body. Whole-grain foods are healthy and provide extra fiber in your diet. Some examples of whole-grain foods are whole-wheat breads and pastas, oatmeal, brown rice, and bulgur. Eat a variety of vegetables every day. Include dark, leafy greens such as spinach, kale, fiorella greens, and mustard greens. Eat yellow and orange vegetables such as carrots, sweet potatoes, and winter squash. Eat a variety of fruits every day.   Choose fresh or canned fruit (canned in its own juice or light syrup) instead of juice. Fruit juice has very little or no fiber. Eat low-fat dairy foods. Drink fat-free (skim) milk or 1% milk. Eat fat-free yogurt and low-fat cottage cheese. Try low-fat cheeses such as mozzarella and other reduced-fat cheeses. Choose meat and other protein foods that are low in fat. Choose beans or other legumes such as split peas or lentils. Choose fish, skinless poultry (chicken or turkey), or lean cuts of red meat (beef or pork). Before you cook meat or poultry, cut off any visible fat. Use less fat and oil. Try baking foods instead of frying them. Add less fat, such as margarine, sour cream, regular salad dressing and mayonnaise to foods. Eat fewer high-fat foods. Some examples of high-fat foods include french fries, doughnuts, ice cream, and cakes. Eat fewer sweets. Limit foods and drinks that are high in sugar. This includes candy, cookies, regular soda, and sweetened drinks. Exercise:  Exercise at least 30 minutes per day on most days of the week. Some examples of exercise include walking, biking, dancing, and swimming. You can also fit in more physical activity by taking the stairs instead of the elevator or parking farther away from stores. Ask your healthcare provider about the best exercise plan for you. © Copyright [employer] 2018 Information is for End User's use only and may not be sold, redistributed or otherwise used for commercial purposes.  All illustrations and images included in CareNotes® are the copyrighted property of [employer].A[employer]M., Inc. or  [employer]

## 2023-12-04 ENCOUNTER — APPOINTMENT (OUTPATIENT)
Dept: LAB | Facility: MEDICAL CENTER | Age: 68
End: 2023-12-04
Payer: COMMERCIAL

## 2023-12-04 DIAGNOSIS — Z13.1 SCREENING FOR DIABETES MELLITUS: ICD-10-CM

## 2023-12-04 DIAGNOSIS — Z12.5 SCREENING FOR PROSTATE CANCER: ICD-10-CM

## 2023-12-04 DIAGNOSIS — Z13.6 SCREENING FOR CARDIOVASCULAR CONDITION: ICD-10-CM

## 2023-12-04 DIAGNOSIS — Z11.59 NEED FOR HEPATITIS C SCREENING TEST: ICD-10-CM

## 2023-12-04 LAB
CHOLEST SERPL-MCNC: 170 MG/DL
GLUCOSE P FAST SERPL-MCNC: 86 MG/DL (ref 65–99)
HCV AB SER QL: NORMAL
HDLC SERPL-MCNC: 64 MG/DL
LDLC SERPL CALC-MCNC: 93 MG/DL (ref 0–100)
NONHDLC SERPL-MCNC: 106 MG/DL
PSA SERPL-MCNC: 5.67 NG/ML (ref 0–4)
TRIGL SERPL-MCNC: 64 MG/DL

## 2023-12-04 PROCEDURE — G0103 PSA SCREENING: HCPCS

## 2023-12-04 PROCEDURE — 86803 HEPATITIS C AB TEST: CPT

## 2023-12-04 PROCEDURE — 82947 ASSAY GLUCOSE BLOOD QUANT: CPT

## 2023-12-04 PROCEDURE — 36415 COLL VENOUS BLD VENIPUNCTURE: CPT

## 2023-12-04 PROCEDURE — 80061 LIPID PANEL: CPT

## 2023-12-05 ENCOUNTER — TELEPHONE (OUTPATIENT)
Dept: FAMILY MEDICINE CLINIC | Facility: CLINIC | Age: 68
End: 2023-12-05

## 2023-12-05 NOTE — TELEPHONE ENCOUNTER
----- Message from Lolis Sanchez DO sent at 12/4/2023  7:57 PM EST -----  Call patient. Labs normal other than PSA 5.67.   Patient to follow-up with urology

## 2023-12-28 ENCOUNTER — OFFICE VISIT (OUTPATIENT)
Dept: UROLOGY | Facility: MEDICAL CENTER | Age: 68
End: 2023-12-28
Payer: COMMERCIAL

## 2023-12-28 VITALS
HEIGHT: 73 IN | BODY MASS INDEX: 28.89 KG/M2 | DIASTOLIC BLOOD PRESSURE: 78 MMHG | HEART RATE: 86 BPM | SYSTOLIC BLOOD PRESSURE: 118 MMHG | OXYGEN SATURATION: 91 % | WEIGHT: 218 LBS

## 2023-12-28 DIAGNOSIS — R97.20 ELEVATED PSA: Primary | ICD-10-CM

## 2023-12-28 DIAGNOSIS — N40.0 BPH WITHOUT URINARY OBSTRUCTION: ICD-10-CM

## 2023-12-28 DIAGNOSIS — R39.15 URGENCY OF URINATION: ICD-10-CM

## 2023-12-28 LAB
BACTERIA UR QL AUTO: ABNORMAL /HPF
BILIRUB UR QL STRIP: NEGATIVE
CLARITY UR: CLEAR
COLOR UR: ABNORMAL
GLUCOSE UR STRIP-MCNC: NEGATIVE MG/DL
HGB UR QL STRIP.AUTO: ABNORMAL
KETONES UR STRIP-MCNC: NEGATIVE MG/DL
LEUKOCYTE ESTERASE UR QL STRIP: NEGATIVE
MUCOUS THREADS UR QL AUTO: ABNORMAL
NITRITE UR QL STRIP: NEGATIVE
NON-SQ EPI CELLS URNS QL MICRO: ABNORMAL /HPF
PH UR STRIP.AUTO: 6 [PH]
POST-VOID RESIDUAL VOLUME, ML POC: 128 ML
PROT UR STRIP-MCNC: NEGATIVE MG/DL
RBC #/AREA URNS AUTO: ABNORMAL /HPF
SP GR UR STRIP.AUTO: 1.02 (ref 1–1.03)
UROBILINOGEN UR STRIP-ACNC: <2 MG/DL
WBC #/AREA URNS AUTO: ABNORMAL /HPF

## 2023-12-28 PROCEDURE — 81001 URINALYSIS AUTO W/SCOPE: CPT | Performed by: STUDENT IN AN ORGANIZED HEALTH CARE EDUCATION/TRAINING PROGRAM

## 2023-12-28 PROCEDURE — 51798 US URINE CAPACITY MEASURE: CPT | Performed by: STUDENT IN AN ORGANIZED HEALTH CARE EDUCATION/TRAINING PROGRAM

## 2023-12-28 PROCEDURE — 99214 OFFICE O/P EST MOD 30 MIN: CPT | Performed by: STUDENT IN AN ORGANIZED HEALTH CARE EDUCATION/TRAINING PROGRAM

## 2023-12-28 PROCEDURE — 87086 URINE CULTURE/COLONY COUNT: CPT | Performed by: STUDENT IN AN ORGANIZED HEALTH CARE EDUCATION/TRAINING PROGRAM

## 2023-12-28 NOTE — PROGRESS NOTES
"Urology Ambulatory Progress Note  12/28/2023    Magno Mak  1955  [id number]      Assessment/Plan:  Problem List Items Addressed This Visit          Genitourinary    BPH without urinary obstruction    Relevant Orders    POCT Measure PVR    Urine culture    Urinalysis with microscopic       Other    Elevated PSA - Primary    Relevant Orders    MRI prostate multiparametric wo w contrast    BUN    Creatinine, serum    PSA Total, Diagnostic     Other Visit Diagnoses       Urgency of urination        Relevant Orders    Urine culture    Urinalysis with microscopic          Elevated PSA- We discussed the implication of elevated PSA.  I explained that an elevation PSA does not necessarily mean that he has a prostate cancer as PSA as a marker for \"everything prostate.\"  I explained the PSA elevation can be due to but not limited to a urinary tract infection, a prostatic infection, recent ejaculation, urinary retention, or recent catheterization.  I explained that prostate cancer screening consisted of the combination of digital rectal exam and PSA performed annually.  If either one of these is abnormal recommend further evaluation.  The NCCN guidelines recommend obtaining an MRI of the prostate prior to biopsy to have the option of increased specificity with MRI targeting.  I did provide the option of proceeding straight to biopsy, transrectal or transperineal, the patient elects to repeat the PSA and obtain an MRI.  If the MRI demonstrates a PI-RADS lesion of 3 or more we will proceed with a targeted biopsy.  If PI-RADS 1 or 2 was identified we will repeat the PSA in about 6 months.  BPH-patient is doing well on Flomax.  Does not currently need a refill.    Urine sent for analysis with micro and culture, will repeat PSA and obtain multiparametric MRI of the prostate.  Plan for continued PSA surveillance versus proceeding with targeted biopsy based on results.    Chief Complaint: Follow-up for elevated PSA and " BPH    History of Present Illness  Magno Mak is a 68 y.o. male presenting for re-evaluation of BPH and elevated PSA.     He notices a big difference in his urinary symptoms when he doesn't take the medicine.  As long as he takes the medicine his urinary pattern is acceptable.  Baseline symptoms are urgency, nocturia x 3 but goes right back to sleep.  AUA SS is 13 which is improved from 19 6 months ago.  Has noticed significant itching in the scrotum that occurs usually overnight. Improved with itching and applying cold water. Has noticed this over the last year.  He is concerned about his PSA being elevated and inquires about the process of diagnosing prostate cancer.    AUA SYMPTOM SCORE      Flowsheet Row Most Recent Value   AUA SYMPTOM SCORE    How often have you had a sensation of not emptying your bladder completely after you finished urinating? 1   How often have you had to urinate again less than two hours after you finished urinating? 2   How often have you found you stopped and started again several times when you urinate? 2   How often have you found it difficult to postpone urination? 3   How often have you had a weak urinary stream? 2   How often have you had to push or strain to begin urination? 0   How many times did you most typically get up to urinate from the time you went to bed at night until the time you got up in the morning? 3   Quality of Life: If you were to spend the rest of your life with your urinary condition just the way it is now, how would you feel about that? 3   AUA SYMPTOM SCORE 13              Previous PSA values:  Lab Results   Component Value Date    PSA 5.67 (H) 12/04/2023    PSA 4.0 05/09/2023    PSA 3.6 11/28/2022    PSA 5.3 (H) 11/08/2022    PSA 4.2 (H) 11/03/2021    PSA 3.7 11/13/2020       Past Medical History  Past Medical History:   Diagnosis Date    Allergic     Arthritis     Knee pain     Spinal stenosis     Varicose veins of lower extremity     Visual impairment  "       Past Surgical History  Past Surgical History:   Procedure Laterality Date    COLONOSCOPY      EXCISIONAL HEMORRHOIDECTOMY      KNEE SURGERY      LAMINECTOMY      lumbar       Physical Exam  /78 (BP Location: Left arm, Patient Position: Sitting, Cuff Size: Standard)   Pulse 86   Ht 6' 1\" (1.854 m)   Wt 98.9 kg (218 lb)   SpO2 91%   BMI 28.76 kg/m²     General:  Healthy appearing male in no acute distress.   Head:  Normocephalic, atraumatic.   Cardiovascular:  Regular rate  Respiratory:  Patient has unlabored respirations.   GULSHAN: Enlarged prostate, greater than 60 g, smooth, symmetric, without irregularity, nodularity, or induration  Genitourinary: Testes are normal to palpation bilaterally, normal circumcised phallus, large left varicocele, normal right spermatic cord.      Nasreen Sawyer MD  [facility]    Portions of the above record have been created with voice recognition software. Occasional wrong word or \"sound alike\" substitution may have occurred due to the inherent limitations of voice recognition software.  Please read the chart carefully and recognize, using context, where substitution may have occurred.  For further clarification, please contact me directly.   "

## 2023-12-29 LAB — BACTERIA UR CULT: NORMAL

## 2024-01-04 ENCOUNTER — TELEPHONE (OUTPATIENT)
Dept: UROLOGY | Facility: MEDICAL CENTER | Age: 69
End: 2024-01-04

## 2024-01-04 DIAGNOSIS — R31.29 MICROHEMATURIA: Primary | ICD-10-CM

## 2024-01-04 NOTE — TELEPHONE ENCOUNTER
Urinalysis reviewed.  2-4 RBCs per high-power field noted with negative culture.  Please have patient obtain repeat urine testing in the lab prior to next visit when he gets his PSA.

## 2024-01-05 ENCOUNTER — APPOINTMENT (OUTPATIENT)
Dept: LAB | Facility: MEDICAL CENTER | Age: 69
End: 2024-01-05
Payer: COMMERCIAL

## 2024-01-05 DIAGNOSIS — R31.29 MICROHEMATURIA: Primary | ICD-10-CM

## 2024-01-05 DIAGNOSIS — R97.20 ELEVATED PSA: ICD-10-CM

## 2024-01-05 LAB
BACTERIA UR QL AUTO: NORMAL /HPF
BILIRUB UR QL STRIP: NEGATIVE
BUN SERPL-MCNC: 11 MG/DL (ref 5–25)
CLARITY UR: CLEAR
COLOR UR: ABNORMAL
CREAT SERPL-MCNC: 0.74 MG/DL (ref 0.6–1.3)
GFR SERPL CREATININE-BSD FRML MDRD: 93 ML/MIN/1.73SQ M
GLUCOSE UR STRIP-MCNC: NEGATIVE MG/DL
HGB UR QL STRIP.AUTO: ABNORMAL
KETONES UR STRIP-MCNC: NEGATIVE MG/DL
LEUKOCYTE ESTERASE UR QL STRIP: NEGATIVE
NITRITE UR QL STRIP: NEGATIVE
NON-SQ EPI CELLS URNS QL MICRO: NORMAL /HPF
PH UR STRIP.AUTO: 6.5 [PH]
PROT UR STRIP-MCNC: NEGATIVE MG/DL
PSA SERPL-MCNC: 5.64 NG/ML (ref 0–4)
RBC #/AREA URNS AUTO: NORMAL /HPF
SP GR UR STRIP.AUTO: 1.01 (ref 1–1.03)
UROBILINOGEN UR STRIP-ACNC: <2 MG/DL
WBC #/AREA URNS AUTO: NORMAL /HPF

## 2024-01-05 PROCEDURE — 84520 ASSAY OF UREA NITROGEN: CPT

## 2024-01-05 PROCEDURE — 84153 ASSAY OF PSA TOTAL: CPT

## 2024-01-05 PROCEDURE — 36415 COLL VENOUS BLD VENIPUNCTURE: CPT

## 2024-01-05 PROCEDURE — 82565 ASSAY OF CREATININE: CPT

## 2024-01-05 PROCEDURE — 81001 URINALYSIS AUTO W/SCOPE: CPT

## 2024-01-08 NOTE — TELEPHONE ENCOUNTER
Spoke with patient. He just had PSA and urine testing done on 1/5/24. He is going for the MRI this Thursday. Please advise if you want additional urine testing done and also when you would like to see him back in the office.

## 2024-01-09 ENCOUNTER — TELEPHONE (OUTPATIENT)
Dept: UROLOGY | Facility: MEDICAL CENTER | Age: 69
End: 2024-01-09

## 2024-01-09 NOTE — TELEPHONE ENCOUNTER
Patient called for the number to find out the cost of the 3D part of his MRI. I gave him the number for Abigail [phone]

## 2024-01-09 NOTE — TELEPHONE ENCOUNTER
Spoke with patient. I gave him reassurance regarding the urine testing. Pt is scheduled with Dr. Sawyer on 2/27/24 at 4pm. I have informed patient we would monitor for his MRI results to let him know when they are back in. Patient was in agreement to this plan and very thankful for the call back.

## 2024-01-11 ENCOUNTER — HOSPITAL ENCOUNTER (OUTPATIENT)
Facility: MEDICAL CENTER | Age: 69
Discharge: HOME | End: 2024-01-11
Payer: COMMERCIAL

## 2024-01-11 DIAGNOSIS — R97.20 ELEVATED PSA: ICD-10-CM

## 2024-01-11 PROCEDURE — 72197 MRI PELVIS W/O & W/DYE: CPT

## 2024-01-11 PROCEDURE — G1004 CDSM NDSC: HCPCS

## 2024-01-11 PROCEDURE — 76377 3D RENDER W/INTRP POSTPROCES: CPT

## 2024-01-11 PROCEDURE — A9585 GADOBUTROL INJECTION: HCPCS | Performed by: STUDENT IN AN ORGANIZED HEALTH CARE EDUCATION/TRAINING PROGRAM

## 2024-01-11 RX ORDER — GADOBUTROL 604.72 MG/ML
9 INJECTION INTRAVENOUS
Status: COMPLETED | OUTPATIENT
Start: 2024-01-11 | End: 2024-01-11

## 2024-01-11 RX ADMIN — GADOBUTROL 9 ML: 604.72 INJECTION INTRAVENOUS at 10:52

## 2024-01-19 ENCOUNTER — TELEPHONE (OUTPATIENT)
Dept: UROLOGY | Facility: MEDICAL CENTER | Age: 69
End: 2024-01-19

## 2024-01-19 ENCOUNTER — OFFICE VISIT (OUTPATIENT)
Dept: UROLOGY | Facility: MEDICAL CENTER | Age: 69
End: 2024-01-19
Payer: COMMERCIAL

## 2024-01-19 VITALS
HEART RATE: 63 BPM | BODY MASS INDEX: 28.23 KG/M2 | DIASTOLIC BLOOD PRESSURE: 70 MMHG | WEIGHT: 213 LBS | SYSTOLIC BLOOD PRESSURE: 130 MMHG | OXYGEN SATURATION: 98 % | HEIGHT: 73 IN

## 2024-01-19 DIAGNOSIS — N40.0 BPH WITHOUT URINARY OBSTRUCTION: ICD-10-CM

## 2024-01-19 DIAGNOSIS — R97.20 ELEVATED PSA: Primary | ICD-10-CM

## 2024-01-19 PROCEDURE — 99214 OFFICE O/P EST MOD 30 MIN: CPT | Performed by: STUDENT IN AN ORGANIZED HEALTH CARE EDUCATION/TRAINING PROGRAM

## 2024-01-19 NOTE — PROGRESS NOTES
Urology Ambulatory Progress Note  1/19/2024    Magno Mak  1955  [id number]      Assessment:  Problem List Items Addressed This Visit          Genitourinary    BPH without urinary obstruction       Other    Elevated PSA - Primary    Relevant Orders    PSA Total, Diagnostic    MISCELLANEOUS LAB TEST     Patient's lower urinary tract symptoms are well-controlled with Flomax.  We had a long discussion about elevated PSA and enlarged prostate including how we diagnose prostate cancer versus enlarged prostate and what the potential treatment options are.  I am reassured by the stability of his PSA as well as the MRI results.  He is anxious about the possibility of missing cancer.  We did discuss the options which include continued PSA monitoring, proceeding with a biopsy or PSA monitoring plus 4K score.  He would like to proceed with 4K score and repeat PSA testing.  I think this is very reasonable.    Plan:  Obtain 4K score  Return to clinic in 3 months with repeat PSA prior    Chief Complaint: Follow-up for BPH and elevated PSA    History of Present Illness  Magno Mak is a 68 y.o. male presenting for re-evaluation of BPH with LUTs and elevated PSA.  Patient has had fluctuating PSA over the years most recently 5.67.  He had a repeat earlier this month that was stable at 5.64.  The patient is very anxious about PSA elevation.  We obtain multiparametric MRI of the prostate which demonstrated a 71 g prostate, PI-RADS 2 anatomy and no dominant lesion.  His urinary symptoms are well-controlled with Flomax.  He has noticed that if he does not take the medication his symptoms recur.      Previous PSA values:  Lab Results   Component Value Date    PSA 5.64 (H) 01/05/2024    PSA 5.67 (H) 12/04/2023    PSA 4.0 05/09/2023    PSA 3.6 11/28/2022    PSA 5.3 (H) 11/08/2022    PSA 4.2 (H) 11/03/2021    PSA 3.7 11/13/2020       Past Medical History  Past Medical History:   Diagnosis Date    Allergic     Arthritis      "Knee pain     Spinal stenosis     Varicose veins of lower extremity     Visual impairment        Past Surgical History  Past Surgical History:   Procedure Laterality Date    COLONOSCOPY      EXCISIONAL HEMORRHOIDECTOMY      KNEE SURGERY      LAMINECTOMY      lumbar       Physical Exam  /70 (BP Location: Left arm, Patient Position: Sitting, Cuff Size: Standard)   Pulse 63   Ht 6' 1\" (1.854 m)   Wt 96.6 kg (213 lb)   SpO2 98%   BMI 28.10 kg/m²     General:  Healthy appearing male in no acute distress.   Head:  Normocephalic, atraumatic.   Cardiovascular:  Regular rate  Respiratory:  Patient has unlabored respirations.     Results  MULTIPARAMETRIC MRI OF THE PROSTATE WITH AND WITHOUT CONTRAST-WITH 3-D POSTPROCESSING     INDICATION: R97.20: Elevated prostate specific antigen (PSA).     COMPARISON: None.     PSA LEVEL: 5.64 ng/mL on January 5, 2024.  PRIOR BIOPSY DATE: No prior biopsy.  BIOPSY RESULTS: Not applicable.     TECHNIQUE: The following pulse sequences were obtained: Small field-of-view axial T1-weighted and multiplanar T2-weighted images; DWI axial and ADC map; large field of view axial T2 weighted images; T1w in-phase and opposed-phase axials of entire pelvis   and dynamic 3D T1w axial before and during IV contrast injection.        CONTRAST: Gadobutrol (Gadavist) 9 mL of Gadobutrol injection (SINGLE-DOSE)     TECHNICAL LIMITATIONS: None.     FINDINGS:     PROSTATE:  Size: 5.3 x 4.9 x 5.7 cm = 71.1 cc.  Post-biopsy hemorrhage: None.  Central gland enlargement (BPH): Moderate.     Focal lesions - No dominant lesion. Heterogeneous peripheral zone. PI-RADSv2.1 Category 2 - Low (clinically significant cancer unlikely).        SEMINAL VESICLES: Unremarkable     Note: Clinically significant cancer is defined on pathology/histology as [place] score greater than or equal to 7, and/or volume of greater than or equal to 0.5 mL, and/or extraprostatic extension.     URINARY BLADDER: Unremarkable.     LYMPH " "NODES: No pelvic lymphadenopathy.     BONES: No suspicious osseous lesion.           IMPRESSION:     1. PI-RADSv2.1 Category 2 - Low (clinically significant cancer is unlikely to be present).     2. Moderate BPH with calculated prostate volume of 71 cc.      Nasreen Sawyer MD  [facility]    Portions of the above record have been created with voice recognition software. Occasional wrong word or \"sound alike\" substitution may have occurred due to the inherent limitations of voice recognition software.  Please read the chart carefully and recognize, using context, where substitution may have occurred.  For further clarification, please contact me directly.   "

## 2024-01-19 NOTE — TELEPHONE ENCOUNTER
Patient was in office today to see Dr. Sawyer who ordered 4K score for patient.    Paperwork was filled out but did not have order for 4K so left paperwork here.    L/M for pt that he can  the paperwork in the office since we now have order attached to the paperwork.  Per nurse, this should be done about three weeks prior to his appt in April.      Also L/M for pt that his 2/27 MRI results appt was cancelled since he was seen today.

## 2024-02-21 PROBLEM — Z12.5 PROSTATE CANCER SCREENING: Status: RESOLVED | Noted: 2019-05-22 | Resolved: 2024-02-21

## 2024-03-13 ENCOUNTER — APPOINTMENT (OUTPATIENT)
Dept: LAB | Facility: MEDICAL CENTER | Age: 69
End: 2024-03-13
Payer: COMMERCIAL

## 2024-03-13 DIAGNOSIS — R97.20 ELEVATED PSA: ICD-10-CM

## 2024-03-13 LAB — PSA SERPL-MCNC: 5.13 NG/ML (ref 0–4)

## 2024-03-13 PROCEDURE — 84153 ASSAY OF PSA TOTAL: CPT

## 2024-03-26 ENCOUNTER — TELEPHONE (OUTPATIENT)
Age: 69
End: 2024-03-26

## 2024-03-26 NOTE — TELEPHONE ENCOUNTER
Patient called today stating that he is returning a call from Dr Alvarado that he received on Friday.    Pt states that Dr Alvarado the message that was left by provider informed the patient that he'd be in on Tuesday and would be available then to speak with patient.    Call back [phone]

## 2024-03-27 NOTE — TELEPHONE ENCOUNTER
Pt called and was informed of the msg. Pt requested a call back to schedule prostate biopsy.     Please review

## 2024-04-02 NOTE — TELEPHONE ENCOUNTER
I returned the patients call and went straight to voice mail. I again left my direct 3 to call back to schedule.

## 2024-04-03 NOTE — TELEPHONE ENCOUNTER
"I spoke to the patient at 10:10 AM and confirmed scheduling his procedure for 5/30/2024 at [facility] with Dr. Tavarez    -instructions given verbally and mailed  -patient aware to be NPO, needs a  and use an enema 1 hour prior to leaving the house morning of procedure  -CBC, CMP, Urine C&S and EKG 2 weeks prior  -PO/JG 6/14/2024    *please note, patient also called back X2 to confirm the date on my direct line as well as to the call center after I spoke to him as he \"couldn't remember if it was 5/30 or 5/31. When I spoke to the patient he spoke over me the entire time I was explaining the instructions. I am not sure if this was  a phone issue or not. I did call back both times to re advise the date of biopsy is 5/30/2024 on his voice mail.  "

## 2024-04-03 NOTE — TELEPHONE ENCOUNTER
I returned the patients call and again went straight to CARLEY BLOOM holding 5/30/24 at [facility] with Dr. Tavarez and asked that he call back to confirm.

## 2024-04-03 NOTE — TELEPHONE ENCOUNTER
Patient called back while I was on the line with another patient, left a voice mail but did not reply regarding date left. I called back within 2 minutes and it again went straight to .    No other phone# or contact available per communication form.    I again asked that he call back to confirm if 5/30/2024 at [facility] with Dr. Tavarez works for him

## 2024-04-12 ENCOUNTER — OFFICE VISIT (OUTPATIENT)
Dept: UROLOGY | Facility: MEDICAL CENTER | Age: 69
End: 2024-04-12
Payer: COMMERCIAL

## 2024-04-12 VITALS
BODY MASS INDEX: 27.96 KG/M2 | OXYGEN SATURATION: 98 % | HEIGHT: 73 IN | DIASTOLIC BLOOD PRESSURE: 70 MMHG | WEIGHT: 211 LBS | HEART RATE: 81 BPM | SYSTOLIC BLOOD PRESSURE: 108 MMHG

## 2024-04-12 DIAGNOSIS — R31.29 MICROHEMATURIA: Primary | ICD-10-CM

## 2024-04-12 DIAGNOSIS — N40.0 BPH WITHOUT URINARY OBSTRUCTION: ICD-10-CM

## 2024-04-12 DIAGNOSIS — R97.20 ELEVATED PSA: ICD-10-CM

## 2024-04-12 LAB
SL AMB  POCT GLUCOSE, UA: ABNORMAL
SL AMB LEUKOCYTE ESTERASE,UA: ABNORMAL
SL AMB POCT BILIRUBIN,UA: ABNORMAL
SL AMB POCT BLOOD,UA: ABNORMAL
SL AMB POCT CLARITY,UA: CLEAR
SL AMB POCT COLOR,UA: YELLOW
SL AMB POCT KETONES,UA: ABNORMAL
SL AMB POCT NITRITE,UA: ABNORMAL
SL AMB POCT PH,UA: 5.5
SL AMB POCT SPECIFIC GRAVITY,UA: 1.01
SL AMB POCT URINE PROTEIN: ABNORMAL
SL AMB POCT UROBILINOGEN: 0.2

## 2024-04-12 PROCEDURE — 99213 OFFICE O/P EST LOW 20 MIN: CPT | Performed by: UROLOGY

## 2024-04-12 PROCEDURE — 81003 URINALYSIS AUTO W/O SCOPE: CPT | Performed by: UROLOGY

## 2024-04-12 NOTE — PROGRESS NOTES
"   HISTORY:    1.  Further discussion of his mildly elevated PSA.  Patient has been very worried about prostate cancer    Couple months back, he and Dr. Sawyer agreed to do 4K score for further information.  Score was 33.7, which is in the \"high suspicion\" for cancer.    Dr. Rose spoke on the phone with him and set up a transperineal biopsy.  Fusion will not be needed because MRI showed nothing significant.    2.  Significant BPH, nocturia x 2-4, slower flow, has been on tamsulosin 1/day with only minimal results    No hematuria infection stones    Digital exam today unremarkable         ASSESSMENT / PLAN:    1.  Proceed with biopsy as scheduled    2.  We again discussed increasing tamsulosin, he will decide if he wants to do that.    The following portions of the patient's history were reviewed and updated as appropriate: allergies, current medications, past family history, past medical history, past social history, past surgical history, and problem list.    Review of Systems      Objective:     Physical Exam  Genitourinary:     Comments: Testes normal    Prostate minimally enlarged no nodule          0   Lab Value Date/Time    PSA 5.13 (H) 03/13/2024 1019    PSA 5.64 (H) 01/05/2024 0904    PSA 5.67 (H) 12/04/2023 0826    PSA 4.0 05/09/2023 0912    PSA 3.6 11/28/2022 0957    PSA 5.3 (H) 11/08/2022 1020    PSA 4.2 (H) 11/03/2021 0933    PSA 3.7 11/13/2020 0843   ]  BUN   Date Value Ref Range Status   01/05/2024 11 5 - 25 mg/dL Final     Creatinine   Date Value Ref Range Status   01/05/2024 0.74 0.60 - 1.30 mg/dL Final     Comment:     Standardized to IDMS reference method     No components found for: \"CBC\"      Patient Active Problem List   Diagnosis    BPH without urinary obstruction    Mild vitamin D deficiency    Vertigo    Tinnitus of left ear    Meningioma (HCC)    Cervicalgia    Left knee pain    Pain in both lower extremities    Varicose veins of both legs with edema    Lumbar radiculopathy    Spinal " stenosis of lumbar region with neurogenic claudication    Primary osteoarthritis of left knee    Dermatitis    Microhematuria    Toe pain, right    Elevated PSA    Onychomycosis    Skin neoplasm        Diagnoses and all orders for this visit:    Microhematuria  -     POCT urine dip auto non-scope    BPH without urinary obstruction    Elevated PSA           Patient ID: Magno Mak is a 68 y.o. male.      Current Outpatient Medications:     betamethasone, augmented, (DIPROLENE-AF) 0.05 % cream, , Disp: , Rfl:     ciclopirox (PENLAC) 8 % solution, , Disp: , Rfl:     sodium chloride (DIANA 128) 5 % hypertonic ophthalmic ointment, Administer 1 drop to both eyes daily, Disp: , Rfl:     tamsulosin (FLOMAX) 0.4 mg, Take 1 capsule (0.4 mg total) by mouth daily with dinner, Disp: 90 capsule, Rfl: 1    urea (CARMOL) 40 %, Apply topically daily, Disp: 227 g, Rfl: 2    neomycin-bacitracin-polymyxin b (NEOSPORIN) ointment, Apply topically 2 (two) times a day for 7 days (Patient not taking: Reported on 12/28/2023), Disp: 15 g, Rfl: 0    piroxicam (FELDENE) 20 mg capsule, Take 1 capsule (20 mg total) by mouth daily as needed (Pain) (Patient not taking: Reported on 1/19/2024), Disp: 30 capsule, Rfl: 3    Past Medical History:   Diagnosis Date    Allergic     Arthritis     Knee pain     Spinal stenosis     Varicose veins of lower extremity     Visual impairment        Past Surgical History:   Procedure Laterality Date    COLONOSCOPY      EXCISIONAL HEMORRHOIDECTOMY      KNEE SURGERY      LAMINECTOMY      lumbar       Social History

## 2024-04-19 DIAGNOSIS — N40.0 BPH WITHOUT URINARY OBSTRUCTION: ICD-10-CM

## 2024-04-19 RX ORDER — TAMSULOSIN HYDROCHLORIDE 0.4 MG/1
0.4 CAPSULE ORAL
Qty: 90 CAPSULE | Refills: 1 | Status: SHIPPED | OUTPATIENT
Start: 2024-04-19

## 2024-05-14 ENCOUNTER — APPOINTMENT (OUTPATIENT)
Dept: LAB | Facility: HOSPITAL | Age: 69
End: 2024-05-14
Payer: COMMERCIAL

## 2024-05-14 DIAGNOSIS — R97.20 ELEVATED PROSTATE SPECIFIC ANTIGEN (PSA): ICD-10-CM

## 2024-05-14 DIAGNOSIS — Z01.812 PRE-OPERATIVE LABORATORY EXAMINATION: ICD-10-CM

## 2024-05-14 DIAGNOSIS — Z01.810 PRE-OPERATIVE CARDIOVASCULAR EXAMINATION: ICD-10-CM

## 2024-05-14 DIAGNOSIS — R39.89 SUSPECTED UTI: ICD-10-CM

## 2024-05-14 LAB
ALBUMIN SERPL BCP-MCNC: 4 G/DL (ref 3.5–5)
ALP SERPL-CCNC: 55 U/L (ref 34–104)
ALT SERPL W P-5'-P-CCNC: 19 U/L (ref 7–52)
ANION GAP SERPL CALCULATED.3IONS-SCNC: 4 MMOL/L (ref 4–13)
AST SERPL W P-5'-P-CCNC: 20 U/L (ref 13–39)
ATRIAL RATE: 77 BPM
BASOPHILS # BLD AUTO: 0.07 THOUSANDS/ÂΜL (ref 0–0.1)
BASOPHILS NFR BLD AUTO: 1 % (ref 0–1)
BILIRUB SERPL-MCNC: 1.49 MG/DL (ref 0.2–1)
BUN SERPL-MCNC: 13 MG/DL (ref 5–25)
CALCIUM SERPL-MCNC: 8.9 MG/DL (ref 8.4–10.2)
CHLORIDE SERPL-SCNC: 107 MMOL/L (ref 96–108)
CO2 SERPL-SCNC: 28 MMOL/L (ref 21–32)
CREAT SERPL-MCNC: 0.78 MG/DL (ref 0.6–1.3)
EOSINOPHIL # BLD AUTO: 0.27 THOUSAND/ÂΜL (ref 0–0.61)
EOSINOPHIL NFR BLD AUTO: 5 % (ref 0–6)
ERYTHROCYTE [DISTWIDTH] IN BLOOD BY AUTOMATED COUNT: 13.7 % (ref 11.6–15.1)
GFR SERPL CREATININE-BSD FRML MDRD: 91 ML/MIN/1.73SQ M
GLUCOSE P FAST SERPL-MCNC: 90 MG/DL (ref 65–99)
HCT VFR BLD AUTO: 43.9 % (ref 36.5–49.3)
HGB BLD-MCNC: 14.4 G/DL (ref 12–17)
IMM GRANULOCYTES # BLD AUTO: 0.01 THOUSAND/UL (ref 0–0.2)
IMM GRANULOCYTES NFR BLD AUTO: 0 % (ref 0–2)
LYMPHOCYTES # BLD AUTO: 1.47 THOUSANDS/ÂΜL (ref 0.6–4.47)
LYMPHOCYTES NFR BLD AUTO: 30 % (ref 14–44)
MCH RBC QN AUTO: 30.1 PG (ref 26.8–34.3)
MCHC RBC AUTO-ENTMCNC: 32.8 G/DL (ref 31.4–37.4)
MCV RBC AUTO: 92 FL (ref 82–98)
MONOCYTES # BLD AUTO: 0.46 THOUSAND/ÂΜL (ref 0.17–1.22)
MONOCYTES NFR BLD AUTO: 9 % (ref 4–12)
NEUTROPHILS # BLD AUTO: 2.68 THOUSANDS/ÂΜL (ref 1.85–7.62)
NEUTS SEG NFR BLD AUTO: 55 % (ref 43–75)
NRBC BLD AUTO-RTO: 0 /100 WBCS
P AXIS: 48 DEGREES
PLATELET # BLD AUTO: 254 THOUSANDS/UL (ref 149–390)
PMV BLD AUTO: 8.9 FL (ref 8.9–12.7)
POTASSIUM SERPL-SCNC: 4.2 MMOL/L (ref 3.5–5.3)
PR INTERVAL: 122 MS
PROT SERPL-MCNC: 6.3 G/DL (ref 6.4–8.4)
QRS AXIS: 36 DEGREES
QRSD INTERVAL: 98 MS
QT INTERVAL: 380 MS
QTC INTERVAL: 430 MS
RBC # BLD AUTO: 4.79 MILLION/UL (ref 3.88–5.62)
SODIUM SERPL-SCNC: 139 MMOL/L (ref 135–147)
T WAVE AXIS: 26 DEGREES
VENTRICULAR RATE: 77 BPM
WBC # BLD AUTO: 4.96 THOUSAND/UL (ref 4.31–10.16)

## 2024-05-14 PROCEDURE — 36415 COLL VENOUS BLD VENIPUNCTURE: CPT

## 2024-05-14 PROCEDURE — 87086 URINE CULTURE/COLONY COUNT: CPT

## 2024-05-14 PROCEDURE — 85025 COMPLETE CBC W/AUTO DIFF WBC: CPT

## 2024-05-14 PROCEDURE — 80053 COMPREHEN METABOLIC PANEL: CPT

## 2024-05-14 PROCEDURE — 93010 ELECTROCARDIOGRAM REPORT: CPT | Performed by: INTERNAL MEDICINE

## 2024-05-15 LAB — BACTERIA UR CULT: NORMAL

## 2024-05-16 ENCOUNTER — TELEPHONE (OUTPATIENT)
Age: 69
End: 2024-05-16

## 2024-05-21 NOTE — PRE-PROCEDURE INSTRUCTIONS
Pre-Surgery Instructions:   Medication Instructions    betamethasone, augmented, (DIPROLENE-AF) 0.05 % cream Hold day of surgery.    ciclopirox (PENLAC) 8 % solution Hold day of surgery.    piroxicam (FELDENE) 20 mg capsule Stop taking 3 days prior to surgery.    sodium chloride (DIANA 128) 5 % hypertonic ophthalmic ointment Hold day of surgery.    tamsulosin (FLOMAX) 0.4 mg Take night before surgery    urea (CARMOL) 40 % Hold day of surgery.    Medication instructions for day surgery reviewed. Please use only a sip of water to take your instructed medications. Avoid all over the counter vitamins, supplements and NSAIDS for one week prior to surgery per anesthesia guidelines. Tylenol is ok to take as needed.     You will receive a call one business day prior to surgery with an arrival time and hospital directions. If your surgery is scheduled on a Monday, the hospital will be calling you on the Friday prior to your surgery. If you have not heard from anyone by 8pm, please call the hospital supervisor through the hospital  at [phone]. ([place] [phone] or [place] [phone]).    Do not eat or drink anything after midnight the night before your surgery, including candy, mints, lifesavers, or chewing gum. Do not drink alcohol 24hrs before your surgery. Try not to smoke at least 24hrs before your surgery.       Follow the pre surgery showering instructions as listed in the “My Surgical Experience Booklet” or otherwise provided by your surgeon's office. Do not use a blade to shave the surgical area 1 week before surgery. It is okay to use a clean electric clippers up to 24 hours before surgery. Do not apply any lotions, creams, including makeup, cologne, deodorant, or perfumes after showering on the day of your surgery. Do not use dry shampoo, hair spray, hair gel, or any type of hair products.     No contact lenses, eye make-up, or artificial eyelashes. Remove nail polish, including gel polish, and  any artificial, gel, or acrylic nails if possible. Remove all jewelry including rings and body piercing jewelry.     Wear causal clothing that is easy to take on and off. Consider your type of surgery.    Keep any valuables, jewelry, piercings at home. Please bring any specially ordered equipment (sling, braces) if indicated.    Arrange for a responsible person to drive you to and from the hospital on the day of your surgery. Please confirm the visitor policy for the day of your procedure when you receive your phone call with an arrival time.     Call the surgeon's office with any new illnesses, exposures, or additional questions prior to surgery.    Please reference your “My Surgical Experience Booklet” for additional information to prepare for your upcoming surgery.

## 2024-05-29 ENCOUNTER — ANESTHESIA EVENT (OUTPATIENT)
Dept: PERIOP | Facility: HOSPITAL | Age: 69
End: 2024-05-29
Payer: COMMERCIAL

## 2024-05-30 ENCOUNTER — HOSPITAL ENCOUNTER (OUTPATIENT)
Facility: HOSPITAL | Age: 69
Setting detail: OUTPATIENT SURGERY
Discharge: HOME | End: 2024-05-30
Attending: UROLOGY | Admitting: UROLOGY
Payer: COMMERCIAL

## 2024-05-30 ENCOUNTER — ANESTHESIA (OUTPATIENT)
Dept: PERIOP | Facility: HOSPITAL | Age: 69
End: 2024-05-30
Payer: COMMERCIAL

## 2024-05-30 ENCOUNTER — NURSE TRIAGE (OUTPATIENT)
Dept: OTHER | Facility: OTHER | Age: 69
End: 2024-05-30

## 2024-05-30 VITALS
DIASTOLIC BLOOD PRESSURE: 74 MMHG | TEMPERATURE: 97.4 F | WEIGHT: 205.25 LBS | HEIGHT: 73 IN | BODY MASS INDEX: 27.2 KG/M2 | HEART RATE: 75 BPM | RESPIRATION RATE: 18 BRPM | SYSTOLIC BLOOD PRESSURE: 129 MMHG | OXYGEN SATURATION: 96 %

## 2024-05-30 DIAGNOSIS — R97.20 ELEVATED PROSTATE SPECIFIC ANTIGEN (PSA): ICD-10-CM

## 2024-05-30 PROCEDURE — NC001 PR NO CHARGE: Performed by: UROLOGY

## 2024-05-30 PROCEDURE — G0416 PROSTATE BIOPSY, ANY MTHD: HCPCS | Performed by: PATHOLOGY

## 2024-05-30 PROCEDURE — 55700 PR PROSTATE NEEDLE BIOPSY ANY APPROACH: CPT | Performed by: UROLOGY

## 2024-05-30 PROCEDURE — 88344 IMHCHEM/IMCYTCHM EA MLT ANTB: CPT | Performed by: PATHOLOGY

## 2024-05-30 PROCEDURE — 76942 ECHO GUIDE FOR BIOPSY: CPT | Performed by: UROLOGY

## 2024-05-30 RX ORDER — PROPOFOL 10 MG/ML
INJECTION, EMULSION INTRAVENOUS AS NEEDED
Status: DISCONTINUED | OUTPATIENT
Start: 2024-05-30 | End: 2024-05-30

## 2024-05-30 RX ORDER — SODIUM CHLORIDE 9 MG/ML
125 INJECTION, SOLUTION INTRAVENOUS CONTINUOUS
Status: DISCONTINUED | OUTPATIENT
Start: 2024-05-30 | End: 2024-05-30 | Stop reason: HOSPADM

## 2024-05-30 RX ORDER — CEFAZOLIN SODIUM 2 G/50ML
2000 SOLUTION INTRAVENOUS EVERY 8 HOURS
Status: DISCONTINUED | OUTPATIENT
Start: 2024-05-30 | End: 2024-05-30 | Stop reason: HOSPADM

## 2024-05-30 RX ORDER — PROPOFOL 10 MG/ML
INJECTION, EMULSION INTRAVENOUS CONTINUOUS PRN
Status: DISCONTINUED | OUTPATIENT
Start: 2024-05-30 | End: 2024-05-30

## 2024-05-30 RX ORDER — MIDAZOLAM HYDROCHLORIDE 2 MG/2ML
INJECTION, SOLUTION INTRAMUSCULAR; INTRAVENOUS AS NEEDED
Status: DISCONTINUED | OUTPATIENT
Start: 2024-05-30 | End: 2024-05-30

## 2024-05-30 RX ORDER — ONDANSETRON 2 MG/ML
4 INJECTION INTRAMUSCULAR; INTRAVENOUS ONCE AS NEEDED
Status: DISCONTINUED | OUTPATIENT
Start: 2024-05-30 | End: 2024-05-30 | Stop reason: HOSPADM

## 2024-05-30 RX ORDER — FENTANYL CITRATE 50 UG/ML
INJECTION, SOLUTION INTRAMUSCULAR; INTRAVENOUS AS NEEDED
Status: DISCONTINUED | OUTPATIENT
Start: 2024-05-30 | End: 2024-05-30

## 2024-05-30 RX ORDER — BUPIVACAINE HYDROCHLORIDE 5 MG/ML
INJECTION, SOLUTION EPIDURAL; INTRACAUDAL AS NEEDED
Status: DISCONTINUED | OUTPATIENT
Start: 2024-05-30 | End: 2024-05-30 | Stop reason: HOSPADM

## 2024-05-30 RX ORDER — FENTANYL CITRATE/PF 50 MCG/ML
25 SYRINGE (ML) INJECTION
Status: DISCONTINUED | OUTPATIENT
Start: 2024-05-30 | End: 2024-05-30 | Stop reason: HOSPADM

## 2024-05-30 RX ADMIN — SODIUM CHLORIDE 125 ML/HR: 0.9 INJECTION, SOLUTION INTRAVENOUS at 11:39

## 2024-05-30 RX ADMIN — CEFAZOLIN SODIUM 2000 MG: 2 SOLUTION INTRAVENOUS at 13:15

## 2024-05-30 RX ADMIN — PROPOFOL 80 MG: 10 INJECTION, EMULSION INTRAVENOUS at 13:18

## 2024-05-30 RX ADMIN — PROPOFOL 100 MCG/KG/MIN: 10 INJECTION, EMULSION INTRAVENOUS at 13:18

## 2024-05-30 RX ADMIN — FENTANYL CITRATE 50 MCG: 50 INJECTION INTRAMUSCULAR; INTRAVENOUS at 13:15

## 2024-05-30 RX ADMIN — MIDAZOLAM 1 MG: 1 INJECTION INTRAMUSCULAR; INTRAVENOUS at 13:15

## 2024-05-30 RX ADMIN — FENTANYL CITRATE 50 MCG: 50 INJECTION INTRAMUSCULAR; INTRAVENOUS at 13:18

## 2024-05-30 RX ADMIN — MIDAZOLAM 1 MG: 1 INJECTION INTRAMUSCULAR; INTRAVENOUS at 13:12

## 2024-05-30 RX ADMIN — PROPOFOL 30 MG: 10 INJECTION, EMULSION INTRAVENOUS at 13:22

## 2024-05-30 NOTE — ANESTHESIA PREPROCEDURE EVALUATION
Procedure:  TRANSPERINEAL ULTRASOUND GUIDED BIOPSY PROSTATE (Perineum)    Relevant Problems   /RENAL   (+) BPH without urinary obstruction      MUSCULOSKELETAL   (+) Primary osteoarthritis of left knee      Urinary   (+) Elevated PSA      Orthopedic/Musculoskeletal   (+) Spinal stenosis of lumbar region with neurogenic claudication        Physical Exam    Airway    Mallampati score: I  TM Distance: >3 FB  Neck ROM: full     Dental   No notable dental hx     Cardiovascular  Cardiovascular exam normal    Pulmonary  Pulmonary exam normal     Other Findings  post-pubertal.      Anesthesia Plan  ASA Score- 2     Anesthesia Type- IV sedation with anesthesia with ASA Monitors.         Additional Monitors:     Airway Plan:            Plan Factors-Exercise tolerance (METS): >4 METS.    Chart reviewed.   Existing labs reviewed. Patient summary reviewed.    Patient is not a current smoker.              Induction-     Postoperative Plan-         Informed Consent-

## 2024-05-30 NOTE — TELEPHONE ENCOUNTER
"Regarding: post biopsy question  ----- Message from Torrie GALINDO sent at 5/30/2024  7:16 PM EDT -----  \"I had a biopsy done today and I wasn't sure if I was able to take a shower once I got home or not\"    "

## 2024-05-30 NOTE — ANESTHESIA POSTPROCEDURE EVALUATION
Post-Op Assessment Note    CV Status:  Stable  Pain Score: 0    Pain management: adequate       Mental Status:  Awake and sleepy   Hydration Status:  Stable   PONV Controlled:  None   Airway Patency:  Patent     Post Op Vitals Reviewed: Yes    No anethesia notable event occurred.    Staff: CRNA               BP      Temp      Pulse     Resp      SpO2

## 2024-05-30 NOTE — H&P
"Magno is a 68-year-old male with an elevated PSA as high as 5.67.  Recent repeat PSA 5.13.  4K score elevated at 33 suspicious for cancer.  MRI shows no biopsy of the lesion.  PI-RADS 2 scoring was noted.  A 71 g gland was noted.  He presents today for a standard transperineal ultrasound-guided biopsy of the prostate.    /81   Pulse 103   Temp 97.9 °F (36.6 °C) (Temporal)   Resp 16   Ht 6' 1\" (1.854 m)   Wt 93.1 kg (205 lb 4 oz)   SpO2 97%   BMI 27.08 kg/m²     On examination he is in no acute distress.  Cardiac is regular.  Respiratory no distress.  Abdomen is soft nontender nondistended.    Impression: Elevated PSA    Plan: Plan for standard ultrasound-guided transperineal 20 core biopsy of the prostate.  Risk of the procedure including, not limited to bleeding, infection, hematuria, hematospermia, and urinary retention were discussed and reviewed.  Informed consent obtained.  "

## 2024-05-30 NOTE — TELEPHONE ENCOUNTER
Reason for Disposition  • Health Information question, no triage required and triager able to answer question    Protocols used: Information Only Call-ADULT-

## 2024-05-30 NOTE — OP NOTE
OPERATIVE REPORT  PATIENT NAME: Magno Mak    :  1955  MRN: [id number]  Pt Location: [facility] OR ROOM 04    SURGERY DATE: 2024    Surgeons and Role:     * Pavan Tavarez MD - Primary    Preop Diagnosis:  Elevated prostate specific antigen (PSA) [R97.20]    Post-Op Diagnosis Codes:     * Elevated prostate specific antigen (PSA) [R97.20]    Procedure(s):  TRANSPERINEAL ULTRASOUND GUIDED BIOPSY PROSTATE    Specimen(s):  * No specimens in log *    Estimated Blood Loss:   Minimal    Drains:  * No LDAs found *    Anesthesia Type:   IV Sedation with Anesthesia    Operative Indications:  Elevated prostate specific antigen (PSA) [R97.20]      Operative Findings:  Standard 20 core saturation transperineal biopsy, no MRI lesion      Complications:   None    Procedure and Technique:  Procedure was transperineal saturation prostate biopsy utilizing the Precision Point perineal access device utilized to map the standard geographic sites of the prostate.    Magno Mak is a 68 y.o. male with history of abnormal digital exam. Patient has not undergone prior biopsy of the prostate with negative results and continued rising PSA.    Patient did undergo pre biopsy multiparametric MRI of the prostate. There were 0 lesions and therefore the patient was scheduled for standard transperineal saturation of the prostate.    The patient was scheduled for his procedure in an outpatient surgical context with the assistance of the anesthesia team for sedation. He was met in the preoperative holding area by the performing urologist, the anesthesia team and the intraoperative nursing staff. The procedure along with its risks and benefits were discussed and reviewed. Patient signed an informed consent.   Patient was then transferred to procedure suite. Pre-procedural time out was performed with all parties present. He was treated with periprocedural antibiotics, Ancef. He was placed in dorsal lithotomy with care to pad all  pressure points. His perineum and genitalia were shave/prepped with chlorhexidine. The scrotum was elevated a secured aware from the perineum above the rectum.      Side-fire, biplanar transrectal ultrasound was introduced and the prostate was imaged in the sagittal and axial views. Prostate gland measurements 71 g by MRI    With the assistance of the UroNav technician, a survey of the prostate anatomy was performed. The technician then linked the previously obtained MRI images to the real-time ultrasound. The PIRADs lesions seen on MRI were identified on the ultrasound.     In the midportion of the left and right prostate, a lois was denoted in the perineal skin. Skin wheal was elevated with 0.5% Marcaine plain on either side.    The PrecisionPoint access needle was then introduced into the left perineal subcutaneous tissue. We visualized the tip of the needle. Spinal needle was introduced through the subcutaneous fat and into the pelvic floor muscle where a perineal pudendal block was performed with additional local anesthetic. This was repeated on the patient's right side.    Utilizing the Precision Point access needle and stepper, ultrasound guidance was utilized to place the spring-loaded biopsy needle into the prostate for the saturation biopsy.    We now moved to take our standard transperineal samples, which allowed us to carefully map and saturate each of the 10 zones that have subdivided the prostate into zonal anatomy.     The Precision Point access needle and stepper was positioned into the RIGHT perineal space and ultrasound guidance was utilized to place the spring-loaded biopsy needle into the following areas    RIGHT anterior medial: 2 biopsies taken  RIGHT posterior medial: 2 biopsies taken  RIGHT posterior lateral: 2 biopsies taken  RIGHT base: 2 biopsies taken  RIGHT anterior lateral: 2 biopsies taken  The Precision Point access needle and stepper was re-positioned into the LEFT perineal space and  ultrasound guidance was utilized to place the spring-loaded biopsy needle into the following areas  LEFT anterior medial: 2 biopsies taken  LEFT posterior medial: 2 biopsies taken  LEFT posterior lateral: 2 biopsies taken  LEFT base: 2 biopsies taken  LEFT anterior lateral: 2 biopsies taken  LEFT anterior medial: 2 biopsies taken    A total of 20 biopsies (including standard transperineal saturation and additional fusion biopsy):20    Transrectal ultrasound removed. The scrotum was released. Some gentle pressure was placed on the perineum for approximately 5 minutes for hemostasis.    At the completion of the procedure, the patient was taken out of dorsal lithotomy, recovered from their anesthesia, and transferred to PACU in good condition.     Overall, the patient tolerated the procedure and there were no complications.    Plan: The patient will be monitored in recovery, allowed to void prior to discharge and return for biopsy pathology review in the office with their primary urologist.      Patient Disposition:  PACU         SIGNATURE: Pavan Tavarez MD  DATE: May 30, 2024  TIME: 1:00 PM

## 2024-05-30 NOTE — TELEPHONE ENCOUNTER
"Answer Assessment - Initial Assessment Questions  1. REASON FOR CALL or QUESTION: \"What is your reason for calling today?\" or \"How can I best help you?\" or \"What question do you have that I can help answer?\"      Patient had a TRANSPERINEAL ULTRASOUND GUIDED BIOPSY PROSTATE (Perineum) today with Dr. Tavarez + was not instructed whether or not he could bathe/shower post procedure.    Protocols used: Information Only Call-ADULT-      Care advice given, per provider. Patient verbalized understanding.   "

## 2024-06-03 PROCEDURE — 88344 IMHCHEM/IMCYTCHM EA MLT ANTB: CPT | Performed by: PATHOLOGY

## 2024-06-03 PROCEDURE — G0416 PROSTATE BIOPSY, ANY MTHD: HCPCS | Performed by: PATHOLOGY

## 2024-06-14 ENCOUNTER — OFFICE VISIT (OUTPATIENT)
Dept: UROLOGY | Facility: MEDICAL CENTER | Age: 69
End: 2024-06-14
Payer: COMMERCIAL

## 2024-06-14 VITALS
HEIGHT: 73 IN | RESPIRATION RATE: 18 BRPM | BODY MASS INDEX: 27.17 KG/M2 | HEART RATE: 75 BPM | WEIGHT: 205 LBS | OXYGEN SATURATION: 99 % | DIASTOLIC BLOOD PRESSURE: 70 MMHG | SYSTOLIC BLOOD PRESSURE: 120 MMHG

## 2024-06-14 DIAGNOSIS — C61 PROSTATE CANCER (HCC): Primary | ICD-10-CM

## 2024-06-14 DIAGNOSIS — R97.20 ELEVATED PSA: ICD-10-CM

## 2024-06-14 PROCEDURE — 99214 OFFICE O/P EST MOD 30 MIN: CPT | Performed by: UROLOGY

## 2024-06-14 NOTE — ASSESSMENT & PLAN NOTE
He is satisfied with his voiding pattern on tamsulosin.  Pathology is reviewed.  There are 3 areas with [place] 3+3 (grade 1)prostate cancer.  [facility] nomogram's were reviewed.  These reveal an excellent 15-year survival as well as a very low risk of metastatic disease.  His MRI reveals a 71 g PI-RADS 2 prostate and there is no evidence of adenopathy or metastatic disease.  NCCN guidelines were then reviewed.  Based on guidelines he has very low or low risk disease.  Active surveillance was recommended.  I described active surveillance to the patient and recommended he return in 6 months.  We will repeat his PSA at that time.  We will likely plan to repeat his multiparametric MRI and then consider confirmatory biopsies in about 1 year.

## 2024-06-14 NOTE — PROGRESS NOTES
"Ambulatory Visit  Name: Magno Mak      : 1955      MRN: [id number]  Encounter Provider: Neil Rose MD  Encounter Date: 2024   Encounter department: [facility] UROLOGY [place]    Assessment & Plan   Prostate cancer  He is satisfied with his voiding pattern on tamsulosin.  Pathology is reviewed.  There are 3 areas with [place] 3+3 (grade 1)prostate cancer.  [facility] nomogram's were reviewed.  These reveal an excellent 15-year survival as well as a very low risk of metastatic disease.  His MRI reveals a 71 g PI-RADS 2 prostate and there is no evidence of adenopathy or metastatic disease.  NCCN guidelines were then reviewed.  Based on guidelines he has very low or low risk disease.  Active surveillance was recommended.  I described active surveillance to the patient and recommended he return in 6 months.  We will repeat his PSA at that time.  We will likely plan to repeat his multiparametric MRI. and then consider confirmatory biopsies in about 1 year.     History of Present Illness     Magno Mak is a 68 y.o. male who presents post biopsy. He tolerated the procedure well. Voiding is back to baseline. No fever. Urine has cleared.  He returns today to review pathology.    Review of Systems   Constitutional:  Negative for chills, diaphoresis, fatigue and fever.   HENT: Negative.     Eyes: Negative.    Respiratory: Negative.     Cardiovascular: Negative.    Endocrine: Negative.    Genitourinary:         See HPI   Musculoskeletal: Negative.    Skin: Negative.    Allergic/Immunologic: Negative.    Neurological: Negative.    Hematological: Negative.    Psychiatric/Behavioral: Negative.           Objective     /70 (BP Location: Left arm, Patient Position: Sitting, Cuff Size: Adult)   Pulse 75   Resp 18   Ht 6' 1\" (1.854 m)   Wt 93 kg (205 lb)   SpO2 99%   BMI 27.05 kg/m²   Physical Exam  Vitals reviewed.   Constitutional:       General: He is not in acute " distress.     Appearance: Normal appearance. He is well-developed and normal weight. He is not ill-appearing, toxic-appearing or diaphoretic.   HENT:      Head: Normocephalic and atraumatic.   Eyes:      General: No scleral icterus.     Conjunctiva/sclera: Conjunctivae normal.   Cardiovascular:      Rate and Rhythm: Normal rate.   Pulmonary:      Effort: Pulmonary effort is normal.   Abdominal:      General: Abdomen is flat. There is no distension.      Palpations: There is no mass.      Tenderness: There is no abdominal tenderness. There is no right CVA tenderness, left CVA tenderness, guarding or rebound.      Hernia: No hernia is present.   Genitourinary:     Penis: Normal.       Testes: Normal.   Musculoskeletal:      Cervical back: Neck supple.   Skin:     General: Skin is warm and dry.   Neurological:      General: No focal deficit present.      Mental Status: He is alert and oriented to person, place, and time.   Psychiatric:         Mood and Affect: Mood normal.         Behavior: Behavior normal.         Thought Content: Thought content normal.         Judgment: Judgment normal.       Results  Lab Results   Component Value Date    PSA 5.13 (H) 03/13/2024    PSA 5.64 (H) 01/05/2024    PSA 5.67 (H) 12/04/2023     Lab Results   Component Value Date    CALCIUM 8.9 05/14/2024    K 4.2 05/14/2024    CO2 28 05/14/2024     05/14/2024    BUN 13 05/14/2024    CREATININE 0.78 05/14/2024     Lab Results   Component Value Date    WBC 4.96 05/14/2024    HGB 14.4 05/14/2024    HCT 43.9 05/14/2024    MCV 92 05/14/2024     05/14/2024       Office Urine Dip  No results found for this or any previous visit (from the past 1 hour(s)).]    Administrative [facility] Nomogram  Primary Treatment Outcomes  15-YEAR PROSTATE CANCER-SPECIFIC [id number] YR 99%  PROGRESSION-FREE PROBABILITY AFTER RADICAL PROSTATECTOMY5 YR 93%10 YR 87%  Extent of Disease Probability  Each extent-of-disease probability  percentage is an independent prediction. We therefore would not expect these percentages to equal 100.    ORGAN-CONFINED DISEASE 74  EXTRACAPSULAR EXTENSION 26  LYMPH NODE INVOLVEMENT 2  SEMINAL VESICLE INVASION 2

## 2024-09-30 DIAGNOSIS — N40.0 BPH WITHOUT URINARY OBSTRUCTION: ICD-10-CM

## 2024-09-30 RX ORDER — TAMSULOSIN HYDROCHLORIDE 0.4 MG/1
0.4 CAPSULE ORAL
Qty: 90 CAPSULE | Refills: 1 | Status: SHIPPED | OUTPATIENT
Start: 2024-09-30

## 2024-10-09 ENCOUNTER — OFFICE VISIT (OUTPATIENT)
Age: 69
End: 2024-10-09
Payer: COMMERCIAL

## 2024-10-09 VITALS
SYSTOLIC BLOOD PRESSURE: 108 MMHG | HEART RATE: 76 BPM | OXYGEN SATURATION: 98 % | BODY MASS INDEX: 28.1 KG/M2 | HEIGHT: 73 IN | TEMPERATURE: 98.4 F | WEIGHT: 212 LBS | DIASTOLIC BLOOD PRESSURE: 64 MMHG

## 2024-10-09 DIAGNOSIS — Z23 ENCOUNTER FOR IMMUNIZATION: ICD-10-CM

## 2024-10-09 DIAGNOSIS — R73.9 ELEVATED BLOOD SUGAR: ICD-10-CM

## 2024-10-09 DIAGNOSIS — C61 PROSTATE CANCER (HCC): Primary | ICD-10-CM

## 2024-10-09 DIAGNOSIS — D32.9 MENINGIOMA (HCC): ICD-10-CM

## 2024-10-09 DIAGNOSIS — Z83.430 FAMILY HISTORY OF ELEVATED LIPOPROTEIN(A): ICD-10-CM

## 2024-10-09 PROCEDURE — 99213 OFFICE O/P EST LOW 20 MIN: CPT | Performed by: FAMILY MEDICINE

## 2024-10-09 PROCEDURE — G0008 ADMIN INFLUENZA VIRUS VAC: HCPCS

## 2024-10-09 PROCEDURE — 90662 IIV NO PRSV INCREASED AG IM: CPT

## 2024-10-11 PROBLEM — R73.9 ELEVATED BLOOD SUGAR: Status: ACTIVE | Noted: 2024-10-11

## 2024-10-11 NOTE — PROGRESS NOTES
Assessment/Plan:    68 y/o male with: prostate CA and meningioma along with elevated blood sugar. Will continue current treatment, check labs. Discussed supportive care and return parameters. Encourage follow-up with specialists.    No problem-specific Assessment & Plan notes found for this encounter.       Diagnoses and all orders for this visit:    Prostate cancer (HCC)  -     CBC and differential; Future  -     Comprehensive metabolic panel; Future  -     TSH, 3rd generation with Free T4 reflex; Future  -     Lipid Panel with Direct LDL reflex; Future  -     Hemoglobin A1C; Future    Meningioma (HCC)  -     CBC and differential; Future  -     Comprehensive metabolic panel; Future  -     TSH, 3rd generation with Free T4 reflex; Future  -     Lipid Panel with Direct LDL reflex; Future  -     Hemoglobin A1C; Future    Family history of elevated lipoprotein(a)  -     TSH, 3rd generation with Free T4 reflex; Future  -     Lipid Panel with Direct LDL reflex; Future  -     Hemoglobin A1C; Future    Elevated blood sugar  -     Hemoglobin A1C; Future    Encounter for immunization  -     Fluzone High-Dose 0.5 mL IM          Subjective:     Chief Complaint   Patient presents with    Follow-up     6 month follow up appointment. Patient is concerned about possibly being prediabetic due to feeling fatigue, numbness in hands and feet and also frequent urination. No further concerns, ng        Patient ID: Magno Mak is a 69 y.o. male.    Patient is a 68 y/o male who presents for follow-up on h/o prostate CA and meningioma along with elevated blood sugar. Pt currently pursuing watchful waiting through specialists no fevers chills nausea or vomiting.        The following portions of the patient's history were reviewed and updated as appropriate: allergies, current medications, past family history, past medical history, past social history, past surgical history and problem list.    Review of Systems   Constitutional:  "Negative.    HENT: Negative.     Eyes: Negative.    Respiratory: Negative.     Cardiovascular: Negative.    Gastrointestinal: Negative.    Endocrine: Negative.    Genitourinary: Negative.    Musculoskeletal: Negative.    Allergic/Immunologic: Negative.    Neurological: Negative.    Hematological: Negative.    Psychiatric/Behavioral: Negative.     All other systems reviewed and are negative.        Objective:      /64 (BP Location: Left arm, Patient Position: Sitting, Cuff Size: Large)   Pulse 76   Temp 98.4 °F (36.9 °C) (Temporal)   Ht 6' 1\" (1.854 m)   Wt 96.2 kg (212 lb)   SpO2 98%   BMI 27.97 kg/m²          Physical Exam  Constitutional:       Appearance: He is well-developed.   HENT:      Head: Atraumatic.      Right Ear: External ear normal.      Left Ear: External ear normal.   Eyes:      Extraocular Movements: EOM normal.      Conjunctiva/sclera: Conjunctivae normal.      Pupils: Pupils are equal, round, and reactive to light.   Cardiovascular:      Rate and Rhythm: Normal rate and regular rhythm.      Heart sounds: Normal heart sounds.   Pulmonary:      Effort: Pulmonary effort is normal. No respiratory distress.      Breath sounds: Normal breath sounds.   Abdominal:      General: There is no distension.      Palpations: Abdomen is soft.      Tenderness: There is no abdominal tenderness. There is no guarding or rebound.   Musculoskeletal:         General: Normal range of motion.      Cervical back: Normal range of motion.   Skin:     General: Skin is warm and dry.   Neurological:      Mental Status: He is alert and oriented to person, place, and time.      Cranial Nerves: No cranial nerve deficit.   Psychiatric:         Mood and Affect: Mood and affect normal.         Behavior: Behavior normal.         Thought Content: Thought content normal.         Judgment: Judgment normal.         "

## 2024-10-21 ENCOUNTER — TELEPHONE (OUTPATIENT)
Age: 69
End: 2024-10-21

## 2024-10-21 DIAGNOSIS — G56.03 BILATERAL CARPAL TUNNEL SYNDROME: Primary | ICD-10-CM

## 2024-10-21 DIAGNOSIS — G56.00 CARPAL TUNNEL SYNDROME, UNSPECIFIED LATERALITY: Primary | ICD-10-CM

## 2024-10-21 NOTE — TELEPHONE ENCOUNTER
Patient called. States he seen Dr. Xavier 10/9/2024 [facility], states Dr Xavier advised patient of wrist support for Carpel Tunnel. Patient insurance advised patient to  request a Script from Dr. Xavier. Patient request a callback with suggestions, Please advise Patient at [phone], if any further questions.

## 2024-12-02 ENCOUNTER — TELEPHONE (OUTPATIENT)
Age: 69
End: 2024-12-02

## 2024-12-02 NOTE — TELEPHONE ENCOUNTER
Pt called to confirm hours for laboratory at [facility] and if he can do walk in. Reviewed chart to confirm he has active orders and advised pt yes he can do walk in at the lab.

## 2024-12-03 ENCOUNTER — APPOINTMENT (OUTPATIENT)
Age: 69
End: 2024-12-03
Payer: COMMERCIAL

## 2024-12-03 ENCOUNTER — RA CDI HCC (OUTPATIENT)
Dept: OTHER | Facility: HOSPITAL | Age: 69
End: 2024-12-03

## 2024-12-03 DIAGNOSIS — C61 PROSTATE CANCER (HCC): ICD-10-CM

## 2024-12-03 DIAGNOSIS — D32.9 MENINGIOMA (HCC): ICD-10-CM

## 2024-12-03 DIAGNOSIS — R73.9 ELEVATED BLOOD SUGAR: ICD-10-CM

## 2024-12-03 DIAGNOSIS — Z83.430 FAMILY HISTORY OF ELEVATED LIPOPROTEIN(A): ICD-10-CM

## 2024-12-03 LAB
ALBUMIN SERPL BCG-MCNC: 4.1 G/DL (ref 3.5–5)
ALP SERPL-CCNC: 56 U/L (ref 34–104)
ALT SERPL W P-5'-P-CCNC: 16 U/L (ref 7–52)
ANION GAP SERPL CALCULATED.3IONS-SCNC: 7 MMOL/L (ref 4–13)
AST SERPL W P-5'-P-CCNC: 19 U/L (ref 13–39)
BACTERIA UR QL AUTO: ABNORMAL /HPF
BASOPHILS # BLD AUTO: 0.06 THOUSANDS/ΜL (ref 0–0.1)
BASOPHILS NFR BLD AUTO: 1 % (ref 0–1)
BILIRUB SERPL-MCNC: 1.52 MG/DL (ref 0.2–1)
BILIRUB UR QL STRIP: NEGATIVE
BUN SERPL-MCNC: 12 MG/DL (ref 5–25)
CALCIUM SERPL-MCNC: 9.2 MG/DL (ref 8.4–10.2)
CHLORIDE SERPL-SCNC: 105 MMOL/L (ref 96–108)
CHOLEST SERPL-MCNC: 151 MG/DL (ref ?–200)
CLARITY UR: CLEAR
CO2 SERPL-SCNC: 30 MMOL/L (ref 21–32)
COLOR UR: ABNORMAL
CREAT SERPL-MCNC: 0.67 MG/DL (ref 0.6–1.3)
EOSINOPHIL # BLD AUTO: 0.22 THOUSAND/ΜL (ref 0–0.61)
EOSINOPHIL NFR BLD AUTO: 5 % (ref 0–6)
ERYTHROCYTE [DISTWIDTH] IN BLOOD BY AUTOMATED COUNT: 13.7 % (ref 11.6–15.1)
EST. AVERAGE GLUCOSE BLD GHB EST-MCNC: 108 MG/DL
GFR SERPL CREATININE-BSD FRML MDRD: 96 ML/MIN/1.73SQ M
GLUCOSE P FAST SERPL-MCNC: 93 MG/DL (ref 65–99)
GLUCOSE UR STRIP-MCNC: NEGATIVE MG/DL
HBA1C MFR BLD: 5.4 %
HCT VFR BLD AUTO: 43.7 % (ref 36.5–49.3)
HDLC SERPL-MCNC: 61 MG/DL
HGB BLD-MCNC: 14.6 G/DL (ref 12–17)
HGB UR QL STRIP.AUTO: ABNORMAL
IMM GRANULOCYTES # BLD AUTO: 0.01 THOUSAND/UL (ref 0–0.2)
IMM GRANULOCYTES NFR BLD AUTO: 0 % (ref 0–2)
KETONES UR STRIP-MCNC: NEGATIVE MG/DL
LDLC SERPL CALC-MCNC: 77 MG/DL (ref 0–100)
LEUKOCYTE ESTERASE UR QL STRIP: NEGATIVE
LYMPHOCYTES # BLD AUTO: 1.49 THOUSANDS/ΜL (ref 0.6–4.47)
LYMPHOCYTES NFR BLD AUTO: 32 % (ref 14–44)
MCH RBC QN AUTO: 30.9 PG (ref 26.8–34.3)
MCHC RBC AUTO-ENTMCNC: 33.4 G/DL (ref 31.4–37.4)
MCV RBC AUTO: 92 FL (ref 82–98)
MONOCYTES # BLD AUTO: 0.45 THOUSAND/ΜL (ref 0.17–1.22)
MONOCYTES NFR BLD AUTO: 10 % (ref 4–12)
NEUTROPHILS # BLD AUTO: 2.47 THOUSANDS/ΜL (ref 1.85–7.62)
NEUTS SEG NFR BLD AUTO: 52 % (ref 43–75)
NITRITE UR QL STRIP: NEGATIVE
NON-SQ EPI CELLS URNS QL MICRO: ABNORMAL /HPF
NRBC BLD AUTO-RTO: 0 /100 WBCS
PH UR STRIP.AUTO: 6.5 [PH]
PLATELET # BLD AUTO: 286 THOUSANDS/UL (ref 149–390)
PMV BLD AUTO: 9.1 FL (ref 8.9–12.7)
POTASSIUM SERPL-SCNC: 4.3 MMOL/L (ref 3.5–5.3)
PROT SERPL-MCNC: 6.4 G/DL (ref 6.4–8.4)
PROT UR STRIP-MCNC: NEGATIVE MG/DL
PSA SERPL-MCNC: 5.24 NG/ML (ref 0–4)
RBC # BLD AUTO: 4.73 MILLION/UL (ref 3.88–5.62)
RBC #/AREA URNS AUTO: ABNORMAL /HPF
SODIUM SERPL-SCNC: 142 MMOL/L (ref 135–147)
SP GR UR STRIP.AUTO: 1.01 (ref 1–1.03)
TRIGL SERPL-MCNC: 63 MG/DL (ref ?–150)
TSH SERPL DL<=0.05 MIU/L-ACNC: 3.05 UIU/ML (ref 0.45–4.5)
UROBILINOGEN UR STRIP-ACNC: <2 MG/DL
WBC # BLD AUTO: 4.7 THOUSAND/UL (ref 4.31–10.16)
WBC #/AREA URNS AUTO: ABNORMAL /HPF

## 2024-12-03 PROCEDURE — 84153 ASSAY OF PSA TOTAL: CPT

## 2024-12-03 PROCEDURE — 80061 LIPID PANEL: CPT

## 2024-12-03 PROCEDURE — 84443 ASSAY THYROID STIM HORMONE: CPT

## 2024-12-03 PROCEDURE — 80053 COMPREHEN METABOLIC PANEL: CPT

## 2024-12-03 PROCEDURE — 81001 URINALYSIS AUTO W/SCOPE: CPT

## 2024-12-03 PROCEDURE — 36415 COLL VENOUS BLD VENIPUNCTURE: CPT

## 2024-12-03 PROCEDURE — 85025 COMPLETE CBC W/AUTO DIFF WBC: CPT

## 2024-12-03 PROCEDURE — 83036 HEMOGLOBIN GLYCOSYLATED A1C: CPT

## 2024-12-04 ENCOUNTER — RESULTS FOLLOW-UP (OUTPATIENT)
Age: 69
End: 2024-12-04

## 2024-12-04 ENCOUNTER — TELEPHONE (OUTPATIENT)
Age: 69
End: 2024-12-04

## 2024-12-04 NOTE — RESULT ENCOUNTER NOTE
Please call patient. Labs are stable except elevated PSA. Recommend follow-up with urology.. Will discuss more fully at next visit.

## 2024-12-04 NOTE — TELEPHONE ENCOUNTER
Pt called, sts that he has PSA done 12/3/24 and his MD called with the results.  Pt sts that he was informed by his MD to F/U with Urology.  Pt confirmed appt with the AP on 12/13/24.  Pt sts he is calling to notify Urology that he had PSA done, so it can be reviewed prior to his upcoming f/u appt.      Pt call back: [phone]

## 2024-12-04 NOTE — TELEPHONE ENCOUNTER
PSA was 5.243 - this is stable from his past 4 PSA's. Further discussion to be had at his upcoming appointment.

## 2024-12-04 NOTE — RESULT ENCOUNTER NOTE
I spoke to patient directly gave results and he will contact his urologist and see Dr. Aguilar on 12/10/24

## 2024-12-13 ENCOUNTER — OFFICE VISIT (OUTPATIENT)
Dept: UROLOGY | Facility: MEDICAL CENTER | Age: 69
End: 2024-12-13
Payer: COMMERCIAL

## 2024-12-13 VITALS
BODY MASS INDEX: 28.1 KG/M2 | OXYGEN SATURATION: 99 % | HEIGHT: 73 IN | WEIGHT: 212 LBS | SYSTOLIC BLOOD PRESSURE: 124 MMHG | DIASTOLIC BLOOD PRESSURE: 82 MMHG | HEART RATE: 83 BPM

## 2024-12-13 DIAGNOSIS — R97.20 ELEVATED PSA: ICD-10-CM

## 2024-12-13 DIAGNOSIS — B35.6 JOCK ITCH: ICD-10-CM

## 2024-12-13 DIAGNOSIS — N40.0 BPH WITHOUT URINARY OBSTRUCTION: ICD-10-CM

## 2024-12-13 DIAGNOSIS — C61 PROSTATE CANCER (HCC): Primary | ICD-10-CM

## 2024-12-13 PROCEDURE — 99214 OFFICE O/P EST MOD 30 MIN: CPT

## 2024-12-13 RX ORDER — TAMSULOSIN HYDROCHLORIDE 0.4 MG/1
0.8 CAPSULE ORAL
Qty: 120 CAPSULE | Refills: 3 | Status: SHIPPED | OUTPATIENT
Start: 2024-12-13

## 2024-12-13 RX ORDER — CLOTRIMAZOLE 1 %
CREAM (GRAM) TOPICAL 2 TIMES DAILY
Qty: 28 G | Refills: 1 | Status: SHIPPED | OUTPATIENT
Start: 2024-12-13 | End: 2024-12-16

## 2024-12-13 NOTE — ASSESSMENT & PLAN NOTE
History of [place] 3+3 equal 6, grade group 1 prostate cancer on active surveillance  [facility] nomogram revealed an excellent 15-year survival as well as low risk for metastatic disease.  Patient's last PSA was performed 12/3/2024 and found to be 5.243, which is relatively stable near his baseline.  Refer to PSA trend below.  GULSHAN performed today.  Refer to physical exam findings.  We discussed that the patient's PSA is overall stable and that we can repeat it in 6 months with follow-up in the office that time to undergo GULSHAN.  However, after discussion the patient would prefer it be retested at 3-month intervals.  The patient will retest his PSA in 3 months and again in 3 months thereafter and return to the office in 6 months.  The patient will be required to undergo a GULSHAN at that time.  Additionally, we should consider repeating the patient's MRI of the prostate.    Lab Results   Component Value Date    PSA 5.243 (H) 12/03/2024    PSA 5.13 (H) 03/13/2024    PSA 5.64 (H) 01/05/2024

## 2024-12-13 NOTE — ASSESSMENT & PLAN NOTE
I recommended trialing a separate soap in the shower, as the patient showers at night and appears that he experiences the scrotal itching after he has done showering prior to bed.  Additionally, we discussed ensuring that he is still using the same laundry detergent and underwear as he may be experiencing a contact dermatitis from these agents as well.  We discussed trying Lotrimin cream for his scrotal itching.  We discussed that he should wash the area well in the shower and then dry well prior to applying the cream.  We discussed that he should do this up to 2 times daily for a month.

## 2024-12-13 NOTE — ASSESSMENT & PLAN NOTE
History of [place] 3+3 equal 6, grade group 1 prostate cancer on active surveillance  [facility] nomogram revealed an excellent 15-year survival as well as low risk for metastatic disease.  Patient's last PSA was performed 12/3/2024 and found to be 5.243, which is relatively stable near his baseline.  Refer to PSA trend below.  GULSHAN performed today.  Refer to physical exam findings.  We discussed that the patient's PSA is overall stable and that we can repeat it in 6 months with follow-up in the office that time to undergo GULSHAN.  However, after discussion the patient would prefer it be retested at 3-month intervals.  The patient will retest his PSA in 3 months and again in 3 months thereafter and return to the office in 6 months.  The patient will be required to undergo a GUSLHAN at that time.  Additionally, we should consider repeating the patient's MRI of the prostate.    Lab Results   Component Value Date    PSA 5.243 (H) 12/03/2024    PSA 5.13 (H) 03/13/2024    PSA 5.64 (H) 01/05/2024

## 2024-12-13 NOTE — PROGRESS NOTES
12/13/2024      Assessment and Plan    69 y.o. male managed by Dr. Rose    Prostate cancer (HCC)  History of [place] 3+3 equal 6, grade group 1 prostate cancer on active surveillance  [facility] nomogram revealed an excellent 15-year survival as well as low risk for metastatic disease.  Patient's last PSA was performed 12/3/2024 and found to be 5.243, which is relatively stable near his baseline.  Refer to PSA trend below.  GULSHAN performed today.  Refer to physical exam findings.  We discussed that the patient's PSA is overall stable and that we can repeat it in 6 months with follow-up in the office that time to undergo GULSHAN.  However, after discussion the patient would prefer it be retested at 3-month intervals.  The patient will retest his PSA in 3 months and again in 3 months thereafter and return to the office in 6 months.  The patient will be required to undergo a GULSHAN at that time.  Additionally, we should consider repeating the patient's MRI of the prostate.    Lab Results   Component Value Date    PSA 5.243 (H) 12/03/2024    PSA 5.13 (H) 03/13/2024    PSA 5.64 (H) 01/05/2024        BPH without urinary obstruction  Patient currently controlled on Flomax 0.8 mg daily  We discussed conservative measures with decreasing his fluid intake 2 to 3 hours prior to bedtime to assist with decreasing his nocturia.  Patient will return in 6 months and if his nocturia persist then we should consider a combination of pharmacotherapy or pursuing a cystoscopy in the office to evaluate for bladder outlet obstruction    Mayo knight  I recommended trialing a separate soap in the shower, as the patient showers at night and appears that he experiences the scrotal itching after he has done showering prior to bed.  Additionally, we discussed ensuring that he is still using the same laundry detergent and underwear as he may be experiencing a contact dermatitis from these agents as well.  We discussed trying Lotrimin cream for his  scrotal itching.  We discussed that he should wash the area well in the shower and then dry well prior to applying the cream.  We discussed that he should do this up to 2 times daily for a month.        History of Present Illness  Magno Mak is a 69 y.o. male here for evaluation of history of prostate cancer.  Patient was last seen in the office on 6/14/2024.  Patient underwent prostate biopsy on 5/30/2024 and returned to the office thereafter for a formal pathology review.  Patient was found to have 3 areas of [place] 3+3 equal 6 grade group 1 prostate cancer.  Patient underwent [facility] nomograms and revealed an excellent 15-year survival as well as a very low risk of metastatic disease.  Patient's MRI of the prostate revealed a 71 g PI-RADS 2 prostate and there was no evidence of adenopathy or metastatic disease.  Patient is currently on active surveillance.  Patient's last PSA was performed 12/3/2024 and found to be 5.243, which is relatively stable near his baseline.  Today, the patient reports some degree of erectile dysfunction.  Patient notes that he is able to achieve an erection, but it does not last nearly as long as it once previously did.  Furthermore, the patient notes that recently he has been experiencing itchiness around his testicles and scrotum only at nighttime.  Patient notes that he will take home and scratch the itch and then place some cold water and then he is good the rest of the night.  Patient has not seen any changes in his skin color or abnormal discharge around the scrotal area.  Additionally, the patient reports that he has experienced some soreness/fatigue within his legs and is curious if this is prostate cancer spreading to the bone.  Patient notes that he does still frequently wake up to urinate 3-4 times a night, but has noticed an improvement in his voiding pattern since increasing his Flomax to 0.8 mg daily.        Review of Systems   Constitutional:  Negative for  "chills and fever.   HENT:  Negative for ear pain and sore throat.    Eyes:  Negative for pain and visual disturbance.   Respiratory:  Negative for cough and shortness of breath.    Cardiovascular:  Negative for chest pain and palpitations.   Gastrointestinal:  Negative for abdominal pain and vomiting.   Genitourinary:  Positive for frequency. Negative for decreased urine volume, difficulty urinating, dysuria, flank pain, hematuria and urgency.   Musculoskeletal:  Negative for arthralgias and back pain.   Skin:  Negative for color change and rash.   Neurological:  Negative for seizures and syncope.   All other systems reviewed and are negative.               Vitals  Vitals:    12/13/24 0927   BP: 124/82   Pulse: 83   SpO2: 99%   Weight: 96.2 kg (212 lb)   Height: 6' 1\" (1.854 m)       Physical Exam  Vitals reviewed.   Constitutional:       General: He is not in acute distress.     Appearance: Normal appearance. He is not ill-appearing.   HENT:      Head: Normocephalic and atraumatic.      Nose: Nose normal.   Eyes:      General: No scleral icterus.  Pulmonary:      Effort: No respiratory distress.   Abdominal:      General: Abdomen is flat. There is no distension.      Palpations: Abdomen is soft.      Tenderness: There is no abdominal tenderness.   Genitourinary:     Comments: GLUSHAN performed today.  Prostate estimated to be about 70 g and smooth bilaterally without nodularity or induration  Musculoskeletal:         General: Normal range of motion.      Cervical back: Normal range of motion.   Skin:     General: Skin is warm.      Coloration: Skin is not jaundiced.   Neurological:      Mental Status: He is alert and oriented to person, place, and time.      Gait: Gait normal.   Psychiatric:         Mood and Affect: Mood normal.         Behavior: Behavior normal.           Past History  Past Medical History:   Diagnosis Date    Allergic     Arthritis     Knee pain     Spinal stenosis     Varicose veins of lower " extremity     Visual impairment      Social History     Socioeconomic History    Marital status: Single     Spouse name: Not on file    Number of children: Not on file    Years of education: Not on file    Highest education level: Not on file   Occupational History    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    Occupation: retired   Tobacco Use    Smoking status: Never    Smokeless tobacco: Never   Vaping Use    Vaping status: Never Used   Substance and Sexual Activity    Alcohol use: Yes     Comment: socially    Drug use: Never    Sexual activity: Not Currently   Other Topics Concern    Not on file   Social History Narrative    Not on file     Social Drivers of Health     Financial Resource Strain: Low Risk  (12/1/2023)    Overall Financial Resource Strain (CARDIA)     Difficulty of Paying Living Expenses: Not very hard   Food Insecurity: Not on file   Transportation Needs: No Transportation Needs (12/1/2023)    PRAPARE - Transportation     Lack of Transportation (Medical): No     Lack of Transportation (Non-Medical): No   Physical Activity: Not on file   Stress: Not on file   Social Connections: Not on file   Intimate Partner Violence: Not on file   Housing Stability: Not on file     Social History     Tobacco Use   Smoking Status Never   Smokeless Tobacco Never     Family History   Problem Relation Age of Onset    No Known Problems Mother     Skin cancer Sister        The following portions of the patient's history were reviewed and updated as appropriate: allergies, current medications, past medical history, past social history, past surgical history and problem list.    Results  No results found for this or any previous visit (from the past hour).]  Lab Results   Component Value Date    PSA 5.243 (H) 12/03/2024    PSA 5.13 (H) 03/13/2024    PSA 5.64 (H) 01/05/2024    PSA 5.67 (H) 12/04/2023     Lab Results   Component Value Date    CALCIUM 9.2 12/03/2024    K 4.3 12/03/2024    CO2 30 12/03/2024      12/03/2024    BUN 12 12/03/2024    CREATININE 0.67 12/03/2024     Lab Results   Component Value Date    WBC 4.70 12/03/2024    HGB 14.6 12/03/2024    HCT 43.7 12/03/2024    MCV 92 12/03/2024     12/03/2024

## 2024-12-13 NOTE — ASSESSMENT & PLAN NOTE
Patient currently controlled on Flomax 0.8 mg daily  We discussed conservative measures with decreasing his fluid intake 2 to 3 hours prior to bedtime to assist with decreasing his nocturia.  Patient will return in 6 months and if his nocturia persist then we should consider a combination of pharmacotherapy or pursuing a cystoscopy in the office to evaluate for bladder outlet obstruction

## 2024-12-16 ENCOUNTER — OFFICE VISIT (OUTPATIENT)
Age: 69
End: 2024-12-16
Payer: COMMERCIAL

## 2024-12-16 VITALS
TEMPERATURE: 97.6 F | HEIGHT: 73 IN | HEART RATE: 76 BPM | WEIGHT: 215.6 LBS | DIASTOLIC BLOOD PRESSURE: 80 MMHG | OXYGEN SATURATION: 97 % | SYSTOLIC BLOOD PRESSURE: 120 MMHG | BODY MASS INDEX: 28.57 KG/M2

## 2024-12-16 DIAGNOSIS — Z00.00 MEDICARE ANNUAL WELLNESS VISIT, SUBSEQUENT: Primary | ICD-10-CM

## 2024-12-16 DIAGNOSIS — M25.562 ACUTE PAIN OF LEFT KNEE: ICD-10-CM

## 2024-12-16 DIAGNOSIS — M54.2 CERVICALGIA: ICD-10-CM

## 2024-12-16 PROCEDURE — G0439 PPPS, SUBSEQ VISIT: HCPCS | Performed by: FAMILY MEDICINE

## 2024-12-16 NOTE — ASSESSMENT & PLAN NOTE
Orders:    piroxicam (FELDENE) 20 mg capsule; Take 1 capsule (20 mg total) by mouth daily as needed (Pain)

## 2024-12-16 NOTE — PROGRESS NOTES
Name: Magno Mak      : 1955      MRN: [id number]  Encounter Provider: Saleem Aguilar DO  Encounter Date: 2024   Encounter department: [facility] PRIMARY CARE    Assessment & Plan  Medicare annual wellness visit, subsequent         Cervicalgia    Orders:    piroxicam (FELDENE) 20 mg capsule; Take 1 capsule (20 mg total) by mouth daily as needed (Pain)    Acute pain of left knee    Orders:    piroxicam (FELDENE) 20 mg capsule; Take 1 capsule (20 mg total) by mouth daily as needed (Pain)       Preventive health issues were discussed with patient, and age appropriate screening tests were ordered as noted in patient's After Visit Summary. Personalized health advice and appropriate referrals for health education or preventive services given if needed, as noted in patient's After Visit Summary.    History of Present Illness     HPI   Patient Care Team:  Gerardo Aguilar DO as PCP - General    Review of Systems  Medical History Reviewed by provider this encounter:  Tobacco  Allergies  Meds  Problems  Med Hx  Surg Hx  Fam Hx       Annual Wellness Visit Questionnaire   Magno is here for his Subsequent Wellness visit.     Health Risk Assessment:   Patient rates overall health as good. Patient feels that their physical health rating is same. Patient is satisfied with their life. Eyesight was rated as same. Hearing was rated as same. Patient feels that their emotional and mental health rating is same. Patients states they are never, rarely angry. Patient states they are sometimes unusually tired/fatigued. Pain experienced in the last 7 days has been some. Patient's pain rating has been 3/10. Patient states that he has experienced no weight loss or gain in last 6 months.     Depression Screening:   PHQ-2 Score: 0      Fall Risk Screening:   In the past year, patient has experienced: no history of falling in past year      Home Safety:  Patient does not have trouble with stairs inside or outside of  their home. Patient has working smoke alarms and has working carbon monoxide detector. Home safety hazards include: none.     Nutrition:   Current diet is Regular and Limited junk food.     Medications:   Patient is not currently taking any over-the-counter supplements. Patient is able to manage medications.     Activities of Daily Living (ADLs)/Instrumental Activities of Daily Living (IADLs):   Walk and transfer into and out of bed and chair?: Yes  Dress and groom yourself?: Yes    Bathe or shower yourself?: Yes    Feed yourself? Yes  Do your laundry/housekeeping?: Yes  Manage your money, pay your bills and track your expenses?: Yes  Make your own meals?: Yes    Do your own shopping?: Yes    Previous Hospitalizations:   Any hospitalizations or ED visits within the last 12 months?: No      Advance Care Planning:   Living will: No    Durable POA for healthcare: No    Advanced directive: No      Cognitive Screening:   Provider or family/friend/caregiver concerned regarding cognition?: No    PREVENTIVE SCREENINGS      Cardiovascular Screening:    General: Screening Current and Risks and Benefits Discussed      Diabetes Screening:     General: Screening Current and Risks and Benefits Discussed      Colorectal Cancer Screening:     General: Screening Current      Prostate Cancer Screening:    General: History Prostate Cancer, Risks and Benefits Discussed and Screening Current      Osteoporosis Screening:    General: Risks and Benefits Discussed      Abdominal Aortic Aneurysm (AAA) Screening:    Risk factors include: age between 65-76 yo        General: Risks and Benefits Discussed and Screening Not Indicated      Lung Cancer Screening:     General: Screening Not Indicated and Risks and Benefits Discussed      Hepatitis C Screening:    General: Risks and Benefits Discussed and Screening Current    Other Counseling Topics:   Regular weightbearing exercise.     Social Drivers of Health     Financial Resource Strain: Low  "Risk  (12/1/2023)    Overall Financial Resource Strain (CARDIA)     Difficulty of Paying Living Expenses: Not very hard   Food Insecurity: No Food Insecurity (12/16/2024)    Hunger Vital Sign     Worried About Running Out of Food in the Last Year: Never true     Ran Out of Food in the Last Year: Never true   Transportation Needs: No Transportation Needs (12/16/2024)    PRAPARE - Transportation     Lack of Transportation (Medical): No     Lack of Transportation (Non-Medical): No   Housing Stability: Low Risk  (12/16/2024)    Housing Stability Vital Sign     Unable to Pay for Housing in the Last Year: No     Number of Times Moved in the Last Year: 1     Homeless in the Last Year: No   Utilities: Not At Risk (12/16/2024)    [facility] Utilities     Threatened with loss of utilities: No     No results found.    Objective   /80 (BP Location: Right arm, Patient Position: Sitting, Cuff Size: Large)   Pulse 76   Temp 97.6 °F (36.4 °C) (Temporal)   Ht 6' 1\" (1.854 m)   Wt 97.8 kg (215 lb 9.6 oz)   SpO2 97%   BMI 28.44 kg/m²     Physical Exam    "

## 2024-12-16 NOTE — PATIENT INSTRUCTIONS
Medicare Preventive Visit Patient Instructions  Thank you for completing your Welcome to Medicare Visit or Medicare Annual Wellness Visit today. Your next wellness visit will be due in one year (12/17/2025).  The screening/preventive services that you may require over the next 5-10 years are detailed below. Some tests may not apply to you based off risk factors and/or age. Screening tests ordered at today's visit but not completed yet may show as past due. Also, please note that scanned in results may not display below.  Preventive Screenings:  Service Recommendations Previous Testing/Comments   Colorectal Cancer Screening  Colonoscopy    Fecal Occult Blood Test (FOBT)/Fecal Immunochemical Test (FIT)  Fecal DNA/Cologuard Test  Flexible Sigmoidoscopy Age: 45-75 years old   Colonoscopy: every 10 years (May be performed more frequently if at higher risk)  OR  FOBT/FIT: every 1 year  OR  Cologuard: every 3 years  OR  Sigmoidoscopy: every 5 years  Screening may be recommended earlier than age 45 if at higher risk for colorectal cancer. Also, an individualized decision between you and your healthcare provider will decide whether screening between the ages of 76-85 would be appropriate. Colonoscopy: 10/20/2020  FOBT/FIT: Not on file  Cologuard: Not on file  Sigmoidoscopy: Not on file    Screening Current     Prostate Cancer Screening Individualized decision between patient and health care provider in men between ages of 55-69   Medicare will cover every 12 months beginning on the day after your 50th birthday PSA: 5.243 ng/mL     History Prostate Cancer     Hepatitis C Screening Once for adults born between 1945 and 1965  More frequently in patients at high risk for Hepatitis C Hep C Antibody: 12/04/2023        Diabetes Screening 1-2 times per year if you're at risk for diabetes or have pre-diabetes Fasting glucose: 93 mg/dL (12/3/2024)  A1C: 5.4 % (12/3/2024)  Screening Current   Cholesterol Screening Once every 5 years if  you don't have a lipid disorder. May order more often based on risk factors. Lipid panel: 12/03/2024  Screening Current      Other Preventive Screenings Covered by Medicare:  Abdominal Aortic Aneurysm (AAA) Screening: covered once if your at risk. You're considered to be at risk if you have a family history of AAA or a male between the age of 65-75 who smoking at least 100 cigarettes in your lifetime.  Lung Cancer Screening: covers low dose CT scan once per year if you meet all of the following conditions: (1) Age 55-77; (2) No signs or symptoms of lung cancer; (3) Current smoker or have quit smoking within the last 15 years; (4) You have a tobacco smoking history of at least 20 pack years (packs per day x number of years you smoked); (5) You get a written order from a healthcare provider.  Glaucoma Screening: covered annually if you're considered high risk: (1) You have diabetes OR (2) Family history of glaucoma OR (3)  aged 50 and older OR (4)  American aged 65 and older  Osteoporosis Screening: covered every 2 years if you meet one of the following conditions: (1) Have a vertebral abnormality; (2) On glucocorticoid therapy for more than 3 months; (3) Have primary hyperparathyroidism; (4) On osteoporosis medications and need to assess response to drug therapy.  HIV Screening: covered annually if you're between the age of 15-65. Also covered annually if you are younger than 15 and older than 65 with risk factors for HIV infection. For pregnant patients, it is covered up to 3 times per pregnancy.    Immunizations:  Immunization Recommendations   Influenza Vaccine Annual influenza vaccination during flu season is recommended for all persons aged >= 6 months who do not have contraindications   Pneumococcal Vaccine   * Pneumococcal conjugate vaccine = PCV13 (Prevnar 13), PCV15 (Vaxneuvance), PCV20 (Prevnar 20)  * Pneumococcal polysaccharide vaccine = PPSV23 (Pneumovax) Adults 19-63 yo with  certain risk factors or if 65+ yo  If never received any pneumonia vaccine: recommend Prevnar 20 (PCV20)  Give PCV20 if previously received 1 dose of PCV13 or PPSV23   Hepatitis B Vaccine 3 dose series if at intermediate or high risk (ex: diabetes, end stage renal disease, liver disease)   Respiratory syncytial virus (RSV) Vaccine - COVERED BY MEDICARE PART D  * RSVPreF3 (Arexvy) CDC recommends that adults 60 years of age and older may receive a single dose of RSV vaccine using shared clinical decision-making (SCDM)   Tetanus (Td) Vaccine - COST NOT COVERED BY MEDICARE PART B Following completion of primary series, a booster dose should be given every 10 years to maintain immunity against tetanus. Td may also be given as tetanus wound prophylaxis.   Tdap Vaccine - COST NOT COVERED BY MEDICARE PART B Recommended at least once for all adults. For pregnant patients, recommended with each pregnancy.   Shingles Vaccine (Shingrix) - COST NOT COVERED BY MEDICARE PART B  2 shot series recommended in those 19 years and older who have or will have weakened immune systems or those 50 years and older     Health Maintenance Due:      Topic Date Due   • Colorectal Cancer Screening  10/20/2025   • Hepatitis C Screening  Discontinued     Immunizations Due:      Topic Date Due   • Pneumococcal Vaccine: 65+ Years (1 of 1 - PCV) Never done     Advance Directives   What are advance directives?  Advance directives are legal documents that state your wishes and plans for medical care. These plans are made ahead of time in case you lose your ability to make decisions for yourself. Advance directives can apply to any medical decision, such as the treatments you want, and if you want to donate organs.   What are the types of advance directives?  There are many types of advance directives, and each state has rules about how to use them. You may choose a combination of any of the following:  Living will:  This is a written record of the  treatment you want. You can also choose which treatments you do not want, which to limit, and which to stop at a certain time. This includes surgery, medicine, IV fluid, and tube feedings.   Durable power of  for healthcare (DPAHC):  This is a written record that states who you want to make healthcare choices for you when you are unable to make them for yourself. This person, called a proxy, is usually a family member or a friend. You may choose more than 1 proxy.  Do not resuscitate (DNR) order:  A DNR order is used in case your heart stops beating or you stop breathing. It is a request not to have certain forms of treatment, such as CPR. A DNR order may be included in other types of advance directives.  Medical directive:  This covers the care that you want if you are in a coma, near death, or unable to make decisions for yourself. You can list the treatments you want for each condition. Treatment may include pain medicine, surgery, blood transfusions, dialysis, IV or tube feedings, and a ventilator (breathing machine).  Values history:  This document has questions about your views, beliefs, and how you feel and think about life. This information can help others choose the care that you would choose.  Why are advance directives important?  An advance directive helps you control your care. Although spoken wishes may be used, it is better to have your wishes written down. Spoken wishes can be misunderstood, or not followed. Treatments may be given even if you do not want them. An advance directive may make it easier for your family to make difficult choices about your care.   Weight Management   Why it is important to manage your weight:  Being overweight increases your risk of health conditions such as heart disease, high blood pressure, type 2 diabetes, and certain types of cancer. It can also increase your risk for osteoarthritis, sleep apnea, and other respiratory problems. Aim for a slow, steady weight  loss. Even a small amount of weight loss can lower your risk of health problems.  How to lose weight safely:  A safe and healthy way to lose weight is to eat fewer calories and get regular exercise. You can lose up about 1 pound a week by decreasing the number of calories you eat by 500 calories each day.   Healthy meal plan for weight management:  A healthy meal plan includes a variety of foods, contains fewer calories, and helps you stay healthy. A healthy meal plan includes the following:  Eat whole-grain foods more often.  A healthy meal plan should contain fiber. Fiber is the part of grains, fruits, and vegetables that is not broken down by your body. Whole-grain foods are healthy and provide extra fiber in your diet. Some examples of whole-grain foods are whole-wheat breads and pastas, oatmeal, brown rice, and bulgur.  Eat a variety of vegetables every day.  Include dark, leafy greens such as spinach, kale, fiorella greens, and mustard greens. Eat yellow and orange vegetables such as carrots, sweet potatoes, and winter squash.   Eat a variety of fruits every day.  Choose fresh or canned fruit (canned in its own juice or light syrup) instead of juice. Fruit juice has very little or no fiber.  Eat low-fat dairy foods.  Drink fat-free (skim) milk or 1% milk. Eat fat-free yogurt and low-fat cottage cheese. Try low-fat cheeses such as mozzarella and other reduced-fat cheeses.  Choose meat and other protein foods that are low in fat.  Choose beans or other legumes such as split peas or lentils. Choose fish, skinless poultry (chicken or turkey), or lean cuts of red meat (beef or pork). Before you cook meat or poultry, cut off any visible fat.   Use less fat and oil.  Try baking foods instead of frying them. Add less fat, such as margarine, sour cream, regular salad dressing and mayonnaise to foods. Eat fewer high-fat foods. Some examples of high-fat foods include french fries, doughnuts, ice cream, and cakes.  Eat  fewer sweets.  Limit foods and drinks that are high in sugar. This includes candy, cookies, regular soda, and sweetened drinks.  Exercise:  Exercise at least 30 minutes per day on most days of the week. Some examples of exercise include walking, biking, dancing, and swimming. You can also fit in more physical activity by taking the stairs instead of the elevator or parking farther away from stores. Ask your healthcare provider about the best exercise plan for you.      © Copyright [employer] 2018 Information is for End User's use only and may not be sold, redistributed or otherwise used for commercial purposes. All illustrations and images included in CareNotes® are the copyrighted property of [employer] Inc[employer] or [employer]

## 2025-01-06 ENCOUNTER — TELEPHONE (OUTPATIENT)
Age: 70
End: 2025-01-06

## 2025-01-06 NOTE — TELEPHONE ENCOUNTER
Pt called and is scheduled for 10am appt [place]. However, he is experiencing leg swelling for 3 days as of today and pain btw his knee and ankle.    Please follow up with pt as necessary.

## 2025-01-07 ENCOUNTER — OFFICE VISIT (OUTPATIENT)
Age: 70
End: 2025-01-07
Payer: COMMERCIAL

## 2025-01-07 ENCOUNTER — APPOINTMENT (OUTPATIENT)
Age: 70
End: 2025-01-07
Payer: COMMERCIAL

## 2025-01-07 ENCOUNTER — HOSPITAL ENCOUNTER (OUTPATIENT)
Dept: NON INVASIVE DIAGNOSTICS | Facility: HOSPITAL | Age: 70
Discharge: HOME | End: 2025-01-07
Payer: COMMERCIAL

## 2025-01-07 VITALS
BODY MASS INDEX: 30.95 KG/M2 | DIASTOLIC BLOOD PRESSURE: 72 MMHG | TEMPERATURE: 98.1 F | RESPIRATION RATE: 16 BRPM | HEIGHT: 70 IN | WEIGHT: 216.2 LBS | OXYGEN SATURATION: 98 % | SYSTOLIC BLOOD PRESSURE: 116 MMHG | HEART RATE: 90 BPM

## 2025-01-07 DIAGNOSIS — M17.11 PRIMARY OSTEOARTHRITIS OF RIGHT KNEE: ICD-10-CM

## 2025-01-07 DIAGNOSIS — M79.89 CALF SWELLING: Primary | ICD-10-CM

## 2025-01-07 DIAGNOSIS — M79.89 CALF SWELLING: ICD-10-CM

## 2025-01-07 DIAGNOSIS — I83.811 VARICOSE VEINS OF RIGHT LOWER EXTREMITY WITH PAIN: ICD-10-CM

## 2025-01-07 DIAGNOSIS — C61 PROSTATE CANCER (HCC): ICD-10-CM

## 2025-01-07 PROCEDURE — G2211 COMPLEX E/M VISIT ADD ON: HCPCS | Performed by: PHYSICIAN ASSISTANT

## 2025-01-07 PROCEDURE — 73562 X-RAY EXAM OF KNEE 3: CPT

## 2025-01-07 PROCEDURE — 93971 EXTREMITY STUDY: CPT

## 2025-01-07 PROCEDURE — 99214 OFFICE O/P EST MOD 30 MIN: CPT | Performed by: PHYSICIAN ASSISTANT

## 2025-01-07 NOTE — PROGRESS NOTES
"Name: Magno Mak      : 1955      MRN: [id number]  Encounter Provider: Romelia Hillman PA-C  Encounter Date: 2025   Encounter department: [facility] PRIMARY CARE  :  Assessment & Plan  Calf swelling  Marybel is going to help him get scheduled for a stat venous Doppler of his right lower extremity  - Treatment pending results  - I did advise him to call [phone] if he does experience any chest pain or shortness of breath  Orders:    VAS VENOUS DUPLEX -LOWER LIMB UNILATERAL; Future    Primary osteoarthritis of right knee  -I did order a weightbearing x-ray view of his right knee.  - Further treatment pending x-ray results  Orders:    XR knee 3 vw right non injury; Future    Prostate cancer (HCC)  -He does continue with urology for his prostate cancer       Varicose veins of right lower extremity with pain  -Stat venous Doppler ordered  Orders:    VAS VENOUS DUPLEX -LOWER LIMB UNILATERAL; Future      M*Neuralieve software was used to dictate this note. It may contain errors with dictating incorrect words/spelling. Please contact provider directly for any questions.        History of Present Illness     Patient presents today for an acute visit for evaluation of right lower leg swelling and pain behind his knee that started over the last several days.  He denies any chest pain, shortness of breath.  He is does have significant varicose veins.  He does notice some pain with weightbearing activities.  He did take Feldene which he has at home for inflammation which did help.      Review of Systems   Musculoskeletal:         As stated in HPI       Objective   /72 (BP Location: Left arm, Patient Position: Sitting, Cuff Size: Large)   Pulse 90   Temp 98.1 °F (36.7 °C) (Temporal)   Resp 16   Ht 5' 10.25\" (1.784 m)   Wt 98.1 kg (216 lb 3.2 oz)   SpO2 98%   BMI 30.80 kg/m²      Physical Exam  Vitals reviewed.   Constitutional:       General: He is not in acute distress.     Appearance: Normal " appearance. He is not ill-appearing, toxic-appearing or diaphoretic.   Musculoskeletal:      Cervical back: Neck supple.      Comments: Right knee: He does have a mild effusion.  He does have a mild flexion contracture.  There is tenderness over the medial joint line.  He does have pain with flexion.    He does have 2+ edema right lower extremity compared to the left.  He does have calf tenderness but a negative Homans' sign.  He does have significant varicosities of both lower extremities.   Neurological:      Mental Status: He is alert.

## 2025-01-08 PROCEDURE — 93971 EXTREMITY STUDY: CPT | Performed by: SURGERY

## 2025-01-09 ENCOUNTER — RESULTS FOLLOW-UP (OUTPATIENT)
Age: 70
End: 2025-01-09

## 2025-01-09 ENCOUNTER — TELEPHONE (OUTPATIENT)
Age: 70
End: 2025-01-09

## 2025-01-09 NOTE — TELEPHONE ENCOUNTER
Pt called, was seen in office 1/7, R leg swelling pain behind knee  Pt had the following studies.   VAS VENOUS DUPLEX -LOWER LIMB UNILATERAL  XR knee 3 vw right non injury    Was advised no signs of blood clot - what are the results of the xray?    Pt does recall getting a sharp pain behind the knee right before the swelling started  He just bent his R knee, felt like he had an   extended slava horse, then got a sharp pain.Pt stated for years, he has had pain in his L knee - maybe over compensating, using R more often, caused something to tear in his R knee?    Pt wants to know since he still has pain, what is the next step?  What should he be doing at home, elevating his leg, etc.    Does provider recommend additional testing - MRI or should he see an orthopedic?  Please advise.

## 2025-01-10 ENCOUNTER — OFFICE VISIT (OUTPATIENT)
Age: 70
End: 2025-01-10
Payer: COMMERCIAL

## 2025-01-10 VITALS
WEIGHT: 216 LBS | OXYGEN SATURATION: 98 % | SYSTOLIC BLOOD PRESSURE: 112 MMHG | DIASTOLIC BLOOD PRESSURE: 60 MMHG | HEART RATE: 86 BPM | BODY MASS INDEX: 30.92 KG/M2 | RESPIRATION RATE: 18 BRPM | TEMPERATURE: 97.8 F | HEIGHT: 70 IN

## 2025-01-10 DIAGNOSIS — M25.561 ACUTE PAIN OF RIGHT KNEE: Primary | ICD-10-CM

## 2025-01-10 PROCEDURE — 99213 OFFICE O/P EST LOW 20 MIN: CPT | Performed by: PHYSICIAN ASSISTANT

## 2025-01-10 NOTE — PROGRESS NOTES
"Name: Magno Mak      : 1955      MRN: [id number]  Encounter Provider: Romelia Hillman PA-C  Encounter Date: 1/10/2025   Encounter department: [facility] PRIMARY CARE  :  Assessment & Plan  Acute pain of right knee  Doppler and x-ray results have been reviewed with him  -I did recommend he make an appointment with orthopedic surgery.  He will go upstairs and make that appointment as soon as possible.  Orders:    Ambulatory Referral to Orthopedic Surgery; Future           History of Present Illness     Patient presents today with persistent knee pain.  He states the pain is off and on.  He states he never got the message that I sent yesterday that he should see orthopedic surgery.  He has been trying to elevate as needed.  He states he notices more discomfort when he is on his leg for a period of time.      Review of Systems   Musculoskeletal:         As stated in HPI       Objective   /60 (BP Location: Left arm, Patient Position: Sitting, Cuff Size: Adult)   Pulse 86   Temp 97.8 °F (36.6 °C) (Temporal)   Resp 18   Ht 5' 10.25\" (1.784 m)   Wt 98 kg (216 lb)   SpO2 98%   BMI 30.77 kg/m²      Physical Exam  Vitals reviewed.   Constitutional:       Appearance: Normal appearance.   HENT:      Head: Normocephalic and atraumatic.   Neurological:      General: No focal deficit present.      Mental Status: He is alert.   Psychiatric:         Mood and Affect: Mood normal.         Behavior: Behavior normal.         Thought Content: Thought content normal.         Judgment: Judgment normal.         "

## 2025-01-13 ENCOUNTER — OFFICE VISIT (OUTPATIENT)
Age: 70
End: 2025-01-13
Payer: COMMERCIAL

## 2025-01-13 ENCOUNTER — APPOINTMENT (OUTPATIENT)
Age: 70
End: 2025-01-13
Payer: COMMERCIAL

## 2025-01-13 DIAGNOSIS — M25.561 RIGHT KNEE PAIN, UNSPECIFIED CHRONICITY: ICD-10-CM

## 2025-01-13 DIAGNOSIS — Z01.89 ENCOUNTER FOR LOWER EXTREMITY COMPARISON IMAGING STUDY: ICD-10-CM

## 2025-01-13 DIAGNOSIS — M25.561 ACUTE PAIN OF RIGHT KNEE: ICD-10-CM

## 2025-01-13 DIAGNOSIS — M25.561 RIGHT KNEE PAIN, UNSPECIFIED CHRONICITY: Primary | ICD-10-CM

## 2025-01-13 PROCEDURE — 73564 X-RAY EXAM KNEE 4 OR MORE: CPT

## 2025-01-13 PROCEDURE — 99204 OFFICE O/P NEW MOD 45 MIN: CPT | Performed by: ORTHOPAEDIC SURGERY

## 2025-01-13 PROCEDURE — 73560 X-RAY EXAM OF KNEE 1 OR 2: CPT

## 2025-01-13 RX ORDER — MELOXICAM 15 MG/1
15 TABLET ORAL DAILY
Qty: 25 TABLET | Refills: 0 | Status: SHIPPED | OUTPATIENT
Start: 2025-01-13 | End: 2025-01-21 | Stop reason: SDUPTHER

## 2025-01-13 NOTE — PROGRESS NOTES
ASSESSMENT:  RIGHT knee ruptured Baker's cyst with calf swelling    PLAN:  Discussed treatment options.  Imaging was reviewed today in the office and explained to the patient.  Educated patient about Baker's cyst.   We also discussed the ultrasound findings: no dvt, likely ruptured Baker's cyst  Can expect swelling to decrease in a few months   Script sent for Physical Therapy, to work on ROM and strength .  Try to minimize high impact activity. Can continue low impact exercises, such as swimming, stationary bike, and walking.   Prescription for Meloxicam 15mg sent to the pharmacy for pain control  Follow up as needed for further management of symptoms  Call the office with any questions or concerns.      REASON FOR VISIT:  Chief Complaint   Patient presents with    Right Knee - Pain       HISTORY OF PRESENT ILLNESS:  RIGHT knee pain for about 1-2 months  Felt a pull in the posterior R knee x4 weeks ago   Had some associated swelling x1 week   Pain is primarily posterior calf   Has some associated muscle spasms  Complete Duplex on 1/7/25 for calf pain, negative result  Pain primarily with ambulation   Takes piroxicam PRN for pain   No instability or mechanical symptoms. No locking or catching  No formal PT, injections or surgeries       Past Medical History:   Diagnosis Date    Allergic     Arthritis     Knee pain     Spinal stenosis     Varicose veins of lower extremity     Visual impairment         Past Surgical History:   Procedure Laterality Date    COLONOSCOPY      EXCISIONAL HEMORRHOIDECTOMY      KNEE SURGERY      LAMINECTOMY      lumbar    MA PROSTATE NEEDLE BIOPSY ANY APPROACH N/A 5/30/2024    Procedure: TRANSPERINEAL ULTRASOUND GUIDED BIOPSY PROSTATE;  Surgeon: Pavan Tavarez MD;  Location: [facility] OR;  Service: Urology       Social History     Socioeconomic History    Marital status: Single     Spouse name: Not on file    Number of children: Not on file    Years of education: Not on file    Highest  education level: Not on file   Occupational History    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    Occupation: retired   Tobacco Use    Smoking status: Never    Smokeless tobacco: Never   Vaping Use    Vaping status: Never Used   Substance and Sexual Activity    Alcohol use: Yes     Comment: socially    Drug use: Never    Sexual activity: Not Currently   Other Topics Concern    Not on file   Social History Narrative    Not on file     Social Drivers of Health     Financial Resource Strain: Low Risk  (12/1/2023)    Overall Financial Resource Strain (CARDIA)     Difficulty of Paying Living Expenses: Not very hard   Food Insecurity: No Food Insecurity (12/16/2024)    Hunger Vital Sign     Worried About Running Out of Food in the Last Year: Never true     Ran Out of Food in the Last Year: Never true   Transportation Needs: No Transportation Needs (12/16/2024)    PRAPARE - Transportation     Lack of Transportation (Medical): No     Lack of Transportation (Non-Medical): No   Physical Activity: Not on file   Stress: Not on file   Social Connections: Not on file   Intimate Partner Violence: Not on file   Housing Stability: Low Risk  (12/16/2024)    Housing Stability Vital Sign     Unable to Pay for Housing in the Last Year: No     Number of Times Moved in the Last Year: 1     Homeless in the Last Year: No       MEDICATIONS:    Current Outpatient Medications:     betamethasone, augmented, (DIPROLENE-AF) 0.05 % cream, , Disp: , Rfl:     ciclopirox (PENLAC) 8 % solution, , Disp: , Rfl:     piroxicam (FELDENE) 20 mg capsule, Take 1 capsule (20 mg total) by mouth daily as needed (Pain), Disp: 30 capsule, Rfl: 3    sodium chloride (DIANA 128) 5 % hypertonic ophthalmic ointment, Administer 1 drop to both eyes daily, Disp: , Rfl:     tamsulosin (FLOMAX) 0.4 mg, Take 2 capsules (0.8 mg total) by mouth daily with dinner, Disp: 120 capsule, Rfl: 3    urea (CARMOL) 40 %, Apply topically daily, Disp: 227 g, Rfl: 2    ALLERGIES:  No  Known Allergies    MAJOR FINDINGS:  Magno Mak is pleasant, healthy appearing, and in no acute distress.     RIGHT knee  Skin intact, ROM 0-0-100   Small effusion and moderate swelling throughout RLE  Pain primarily in the posterior calf   Normal patella tracking   No patella apprehension  Joint line tenderness: none, Ca test: negative  Anterior drawer: negative, Lachman: stable, Posterior drawer: negative   Stable to varus/valgus  Sensation intact sp/dp/t  Strength intact 5/5 dorsiflexion/plantarflexion/SLR   Extremity wwp      LEFT knee  Skin intact, ROM 0-0-115   Trace effusion   Normal patella tracking   No patella apprehension  Joint line tenderness: none, Ca test: deferred due to known advanced arthritis on this side  Anterior drawer: negative, Lachman: stable, Posterior drawer: negative   Stable to varus/valgus  Sensation intact sp/dp/t  Strength intact 5/5 dorsiflexion/plantarflexion/SLR   Extremity wwp  No calf pain    IMAGING  I personally reviewed and interpreted the imaging studies  X-rays performed on the  BILATERAL  knees  shows mild signs of arthritis in the R knee with preserved joint space. Severe arthritis of the L knee without preserved joint space.      CC:  DO Kady Andres Tania, PA-C

## 2025-01-21 ENCOUNTER — OFFICE VISIT (OUTPATIENT)
Age: 70
End: 2025-01-21
Payer: COMMERCIAL

## 2025-01-21 ENCOUNTER — EVALUATION (OUTPATIENT)
Age: 70
End: 2025-01-21
Payer: COMMERCIAL

## 2025-01-21 VITALS
TEMPERATURE: 98.2 F | BODY MASS INDEX: 31.5 KG/M2 | OXYGEN SATURATION: 97 % | WEIGHT: 220 LBS | HEIGHT: 70 IN | SYSTOLIC BLOOD PRESSURE: 124 MMHG | HEART RATE: 89 BPM | DIASTOLIC BLOOD PRESSURE: 70 MMHG

## 2025-01-21 DIAGNOSIS — R60.0 LEG EDEMA, RIGHT: ICD-10-CM

## 2025-01-21 DIAGNOSIS — I83.893 VARICOSE VEINS OF BOTH LEGS WITH EDEMA: ICD-10-CM

## 2025-01-21 DIAGNOSIS — M25.561 RIGHT KNEE PAIN, UNSPECIFIED CHRONICITY: ICD-10-CM

## 2025-01-21 DIAGNOSIS — M79.604 PAIN IN BOTH LOWER EXTREMITIES: ICD-10-CM

## 2025-01-21 DIAGNOSIS — R60.0 LEG EDEMA, RIGHT: Primary | ICD-10-CM

## 2025-01-21 DIAGNOSIS — M25.561 RIGHT KNEE PAIN, UNSPECIFIED CHRONICITY: Primary | ICD-10-CM

## 2025-01-21 DIAGNOSIS — M79.605 PAIN IN BOTH LOWER EXTREMITIES: ICD-10-CM

## 2025-01-21 DIAGNOSIS — M17.12 PRIMARY OSTEOARTHRITIS OF LEFT KNEE: ICD-10-CM

## 2025-01-21 PROCEDURE — G2211 COMPLEX E/M VISIT ADD ON: HCPCS | Performed by: FAMILY MEDICINE

## 2025-01-21 PROCEDURE — 99214 OFFICE O/P EST MOD 30 MIN: CPT | Performed by: FAMILY MEDICINE

## 2025-01-21 PROCEDURE — 97163 PT EVAL HIGH COMPLEX 45 MIN: CPT | Performed by: PHYSICAL THERAPIST

## 2025-01-21 RX ORDER — MELOXICAM 15 MG/1
15 TABLET ORAL DAILY
Qty: 30 TABLET | Refills: 5 | Status: SHIPPED | OUTPATIENT
Start: 2025-01-21

## 2025-01-21 RX ORDER — MELOXICAM 15 MG/1
15 TABLET ORAL DAILY
Qty: 25 TABLET | Refills: 1 | Status: SHIPPED | OUTPATIENT
Start: 2025-01-21 | End: 2025-01-21 | Stop reason: SDUPTHER

## 2025-01-21 NOTE — PROGRESS NOTES
Assessment/Plan:       Diagnoses and all orders for this visit:    Leg edema, right  Comments:  Labs, x-rays, Doppler study reviewed at this time.  Patient will elevate legs and continue with PT and start Jobst stockings  Orders:  -     Elastic Bandages & Supports (Jobst For Men 30-40mmHg Lg) MISC; Use daily Diagnosis lower extremity edema bilaterally    Primary osteoarthritis of left knee  Comments:  Continue with meloxicam    Varicose veins of both legs with edema  Comments:  To consider PT for lymphedema treatment.  Orders:  -     Elastic Bandages & Supports (Jobst For Men 30-40mmHg Lg) MISC; Use daily Diagnosis lower extremity edema bilaterally    Pain in both lower extremities  Comments:  Refill on meloxicam at this time.    Right knee pain, unspecified chronicity  -     meloxicam (Mobic) 15 mg tablet; Take 1 tablet (15 mg total) by mouth daily            Subjective:        Patient ID: Magno Mak is a 69 y.o. male.      Patient is here with swelling of the right leg greater than left.  Patient was having pain bilateral legs.  Patient did see orthopedics.  Patient on meloxicam at this time.  Some improvement noted.  Patient had x-rays bilateral knees as well as Doppler right lower extremity which were reviewed at this time.  Patient with Baker's cyst on the right.  Normal urination.  No chest pain or shortness of breath.  Labs reviewed.          The following portions of the patient's history were reviewed and updated as appropriate: allergies, current medications, past family history, past medical history, past social history, past surgical history and problem list.      Review of Systems   Constitutional: Negative.    HENT: Negative.     Eyes: Negative.    Respiratory: Negative.     Cardiovascular:  Positive for leg swelling.   Gastrointestinal: Negative.    Endocrine: Negative.    Genitourinary: Negative.    Musculoskeletal:  Positive for arthralgias, gait problem and joint swelling.   Skin: Negative.   "  Allergic/Immunologic: Negative.    Hematological: Negative.    Psychiatric/Behavioral: Negative.             Objective:               /70 (BP Location: Right arm, Patient Position: Sitting, Cuff Size: Large)   Pulse 89   Temp 98.2 °F (36.8 °C) (Temporal)   Ht 5' 10.25\" (1.784 m)   Wt 99.8 kg (220 lb)   SpO2 97%   BMI 31.34 kg/m²          Physical Exam  Vitals and nursing note reviewed.   Constitutional:       General: He is not in acute distress.     Appearance: Normal appearance. He is not ill-appearing, toxic-appearing or diaphoretic.   HENT:      Head: Normocephalic and atraumatic.      Right Ear: Tympanic membrane, ear canal and external ear normal. There is no impacted cerumen.      Left Ear: Tympanic membrane, ear canal and external ear normal. There is no impacted cerumen.      Nose: Nose normal. No congestion or rhinorrhea.   Eyes:      General: No scleral icterus.        Right eye: No discharge.         Left eye: No discharge.   Neck:      Vascular: No carotid bruit.   Cardiovascular:      Rate and Rhythm: Normal rate and regular rhythm.      Pulses: Normal pulses.      Heart sounds: Normal heart sounds. No murmur heard.     No friction rub. No gallop.   Pulmonary:      Effort: Pulmonary effort is normal. No respiratory distress.      Breath sounds: Normal breath sounds. No stridor. No wheezing, rhonchi or rales.   Chest:      Chest wall: No tenderness.   Musculoskeletal:         General: No swelling, tenderness, deformity or signs of injury. Normal range of motion.      Cervical back: Normal range of motion and neck supple. No rigidity. No muscular tenderness.      Right lower leg: Edema present.      Left lower leg: Edema present.      Comments: Right lower extremity edema 3/4, left leg 2/4   Lymphadenopathy:      Cervical: No cervical adenopathy.   Skin:     General: Skin is warm and dry.      Capillary Refill: Capillary refill takes less than 2 seconds.      Coloration: Skin is not " jaundiced.      Findings: Lesion present. No bruising, erythema or rash.      Comments: Varicose veins bilaterally.   Neurological:      Mental Status: He is alert and oriented to person, place, and time. Mental status is at baseline.      Cranial Nerves: No cranial nerve deficit.      Sensory: No sensory deficit.      Motor: No weakness.      Coordination: Coordination normal.      Gait: Gait abnormal.   Psychiatric:         Mood and Affect: Mood normal.         Behavior: Behavior normal.         Thought Content: Thought content normal.         Judgment: Judgment normal.

## 2025-01-21 NOTE — PROGRESS NOTES
PT Evaluation   Today's date: 2025  Patient name: Magno Mak  : 1955  MRN: [id number]  Referring provider: Michelle Simons PA-C  Dx:   Encounter Diagnosis     ICD-10-CM    1. Right knee pain, unspecified chronicity  M25.561 Ambulatory Referral to Physical Therapy        Start Time: 1130  Stop Time: 1215  Total time in clinic (min): 45 minutes    Assessment  Impairments: abnormal coordination, abnormal muscle firing, abnormal or restricted ROM, activity intolerance, impaired physical strength, lacks appropriate home exercise program, pain with function and poor body mechanics  Symptom irritability: moderate    Assessment details: Magno Mak is a 69 y.o. male who presents with complaints of right knee pain, unspecified chronicity  (primary encounter diagnosis).  No further referral appears necessary at this time based upon examination results.  Patient presents to PT with impaired strength, impaired ROM, decreased flexibility and impaired ability to complete IADLs.  Prognosis is good given HEP compliance and PT 2-3x/wk.  Positive prognostic indicators include positive attitude toward recovery.   Please contact me if you have any questions or recommendations.  Thank you for the opportunity to share in Don care.       Barriers to therapy: Complex PMH including prostate cancer, BPH, microhematuria as well as skin neoplasm and low back pain  Understanding of Dx/Px/POC: good     Prognosis: good    Goals  Short Term:  Pt will report decreased levels of pain by at least 2 subjective ratings in 4 weeks  Pt will demonstrate improved ROM by at least 10 degrees in 4 weeks  Pt will demonstrate improved strength by 1/2 grade  Long Term:   Pt will be independent in their HEP in 8 weeks  Pt will be be pain free with IADL's  Pt will demonstrate improved FOTO, > 80         Plan  Patient would benefit from: skilled physical therapy  Planned modality interventions: thermotherapy: hydrocollator packs,  "cryotherapy, electrical stimulation/Russian stimulation and low level laser therapy    Planned therapy interventions: activity modification, joint mobilization, manual therapy, motor coordination training, neuromuscular re-education, patient education, self care, therapeutic activities, therapeutic exercise, graded activity, home exercise program, abdominal trunk stabilization, IASTM, balance, flexibility, functional ROM exercises, strengthening and stretching    Frequency: 2x week  Plan of Care beginning date: 1/21/2025  Plan of Care expiration date: 4/21/2025  Treatment plan discussed with: patient  Plan details: Prognosis above is given PT services 2x/week tapering to 1x/week over the next 3 months and home program adherence.        Subjective Evaluation    History of Present Illness  Mechanism of injury: Per MD visit on 1/13/25:   HISTORY OF PRESENT ILLNESS:  RIGHT knee pain for about 1-2 months  · Felt a pull in the posterior R knee x4 weeks ago   · Had some associated swelling x1 week   · Pain is primarily posterior calf   · Has some associated muscle spasms  · Complete Duplex on 1/7/25 for calf pain, negative result  · Pain primarily with ambulation   · Takes piroxicam PRN for pain   · No instability or mechanical symptoms. No locking or catching  · No formal PT, injections or surgeries   Patient presents to PT per MD referral.  Patient reports he has a history of L knee pain (arthritis).  PT had [facility] PTA take a look.  He believes patient has venous insufficiency.  Patient will be seeing his PCP in order to get a script for MLD.  [facility] PTA also recommended compression socks.    Pain   R Knee   Currently 0/10  At Best 0/10  At Worst 6-7/10  Patient denies pain at this time but he is concerned about the swelling in his RLE.    AGGS: N/A   EASES: NA   Patient's Goal: \"I want to get my swelling under control and I want to be able to do physical activity without any issue.\"          Objective     General " Comments:      Knee Comments  LQS: Not Tested Today    Palpation: tenderness noted in the popliteal fossa     Observation: RLE appears larger than LLE; varicose veins are present in bilateral LE except more noticeable on the L than the R     Volumetric Measurements:   Midfoot L 26 cm R 28 cm   Malleoli L 30 cm R 32 cm   Calf (20 cm up from malleoli) L 39 cm R 41 cm   Calf (40 cm up from malleoli) L 39.5 cm R 41 cm     ROM   Flexion L 110* R 120*   Extension L 8* R 8*     Strength   Not Tested Today     Joint Mobility   Hypomobile Tibial Glides AP/ML   Hypomobile Prox Tib Fib Joint      POC expires Unit limit Auth Expiration date PT/OT/ST + Visit Limit?   4/21/25 4 7/19/25 9                             Visit/Unit Tracking  AUTH Status:  Date 1/21 1/21/25-7/19/25 Used 1               Remaining  8                Precautions: Complex PMH including prostate cancer, BPH, microhematuria as well as skin neoplasm and low back pain    Daily Treatment Log  Manuals             MLD                                                     Neuro Re-Ed                                                                                                        Ther Ex                                                                                                                     Ther Activity                                       Gait Training                                       Modalities

## 2025-01-21 NOTE — TELEPHONE ENCOUNTER
Reason for call: : Gerardo Aguilar, manage this medication! Not a Duplicate Patient wanted needed th Pharmacy changed! Repeated the medication and dosage to Patient so no confusion! Patient confirmed medication and dosage!!    [x] Refill   [] Prior Auth  [] Other:     Office:   [x] PCP/Provider -   [] Specialty/Provider -     Medication:     meloxicam (Mobic) 15 mg tablet       Dose/Frequency: Take 1 tablet (15 mg total) by mouth daily,     Quantity:  25 table     Pharmacy: [place] [id number] [place] [facility] - [address] [phone]     Does the patient have enough for 3 days?   [] Yes   [x] No - Send as HP to POD

## 2025-01-22 ENCOUNTER — TELEPHONE (OUTPATIENT)
Age: 70
End: 2025-01-22

## 2025-01-22 DIAGNOSIS — I83.893 VARICOSE VEINS OF BOTH LEGS WITH EDEMA: Primary | ICD-10-CM

## 2025-01-22 DIAGNOSIS — I83.893 VARICOSE VEINS OF BOTH LEGS WITH EDEMA: ICD-10-CM

## 2025-01-22 DIAGNOSIS — R60.0 LEG EDEMA, RIGHT: ICD-10-CM

## 2025-01-22 RX ORDER — COMPRESS.STOCKING,KNEE,REG,MED
1 EACH MISCELLANEOUS DAILY
Qty: 2 EACH | Refills: 1 | Status: SHIPPED | OUTPATIENT
Start: 2025-01-22

## 2025-01-22 RX ORDER — COMPRESS.STOCKING,KNEE,REG,MED
1 EACH MISCELLANEOUS DAILY
Qty: 2 EACH | Refills: 1 | Status: SHIPPED | OUTPATIENT
Start: 2025-01-22 | End: 2025-01-22 | Stop reason: SDUPTHER

## 2025-01-22 NOTE — PROGRESS NOTES
Left message for [facility] to call back to see if they have LILIYA stockings.  Their # is: [phone]  I left message with pt, [facility] does not carry LILIYA stockings.  He will need to go to a DME supply place such ast [facility].  He will need an Rx.  He can also try [facility] -

## 2025-01-22 NOTE — TELEPHONE ENCOUNTER
I resent this to Dr. Aguilar to print for LILIYA stockings, he needs to take this to a DME place such as [facility]

## 2025-01-22 NOTE — TELEPHONE ENCOUNTER
Patient of Dr. Gerardo Aguilar  At office: [place]  Calling about : Returning call to (Merle) regarding LILIYA stockings, contacted practice/clinical , advised (Merle) would return patient call  Began on : 1/22/2025  Action Requested   Preferred method of contact: Please advise Patient at [phone], if any further questions.

## 2025-01-27 DIAGNOSIS — N40.0 BPH WITHOUT URINARY OBSTRUCTION: ICD-10-CM

## 2025-01-27 NOTE — TELEPHONE ENCOUNTER
This is not a duplicate      Reason for call:   [x] Refill   [] Prior Auth  [] Other:     Office:   [] PCP/Provider -   [x] Specialty/Provider - Errol Haynes [facility] [place]    Medication: Tamsulosin     Dose/Frequency: 0.4mg  two caps daily    Quantity: 180/ 90 days supply    Pharmacy: [facility] [id number] [facility] [employer] [address]    Does the patient have enough for 3 days?   [x] Yes   [] No - Send as HP to POD

## 2025-01-28 ENCOUNTER — OFFICE VISIT (OUTPATIENT)
Age: 70
End: 2025-01-28
Payer: COMMERCIAL

## 2025-01-28 DIAGNOSIS — M25.561 RIGHT KNEE PAIN, UNSPECIFIED CHRONICITY: Primary | ICD-10-CM

## 2025-01-28 PROCEDURE — 97140 MANUAL THERAPY 1/> REGIONS: CPT

## 2025-01-28 RX ORDER — TAMSULOSIN HYDROCHLORIDE 0.4 MG/1
0.8 CAPSULE ORAL
Qty: 180 CAPSULE | Refills: 1 | Status: SHIPPED | OUTPATIENT
Start: 2025-01-28

## 2025-01-28 NOTE — PROGRESS NOTES
Daily Note     Today's date: 2025  Patient name: Magno Mak  : 1955  MRN: [id number]  Referring provider: Michelle Simons PA-C  Dx:   Encounter Diagnosis     ICD-10-CM    1. Right knee pain, unspecified chronicity  M25.561                      Subjective: Patient reports he has some compression socks he brought with him. He needs new ones.       Objective: See treatment diary below      Assessment: Magno Mak tolerated MLD to R LE crossing mid sagittal and R transverse  anastomosis to L inguinal and R axillary nodes. Good hydration of skin with no scar. No signs or symptoms of skin infection noted. Continued treatment should benefit.       Plan: Continue per plan of care.        POC expires Unit limit Auth Expiration date PT/OT/ST + Visit Limit?   25 4 25 9                             Visit/Unit Tracking  AUTH Status:  Date 25-25 Used 1 2              Remaining  8 7               Precautions: Complex PMH including prostate cancer, BPH, microhematuria as well as skin neoplasm and low back pain    Daily Treatment Log  Manuals             MLD   SJ           measurements  SJ                                     Neuro Re-Ed                                                                                                        Ther Ex                                                                                                                     Ther Activity                                       Gait Training                                       Modalities

## 2025-02-03 ENCOUNTER — OFFICE VISIT (OUTPATIENT)
Age: 70
End: 2025-02-03
Payer: COMMERCIAL

## 2025-02-03 DIAGNOSIS — M25.561 RIGHT KNEE PAIN, UNSPECIFIED CHRONICITY: Primary | ICD-10-CM

## 2025-02-03 PROCEDURE — 97140 MANUAL THERAPY 1/> REGIONS: CPT

## 2025-02-03 NOTE — PROGRESS NOTES
Daily Note     Today's date: 2/3/2025  Patient name: Magno Mak  : 1955  MRN: [id number]  Referring provider: Michelle Simons PA-C  Dx:   Encounter Diagnosis     ICD-10-CM    1. Right knee pain, unspecified chronicity  M25.561                      Subjective: Patient reports he notes decreased swelling. Many questions regarding treatment and compression.       Objective: See treatment diary below      Assessment: Magno Mak tolerated MLD to R LE crossing mid sagittal and R transverse  anastomosis to L inguinal and R axillary nodes. Good hydration of skin with no scar. No signs or symptoms of skin infection noted. Discussed compression and reviewed use of cream. Continued treatment should benefit.       Plan: Continue per plan of care.        POC expires Unit limit Auth Expiration date PT/OT/ST + Visit Limit?   25 4 25 9                             Visit/Unit Tracking  AUTH Status:  Date  2/3            1/21/25-25 Used 1 2 3             Remaining  8 7 6              Precautions: Complex PMH including prostate cancer, BPH, microhematuria as well as skin neoplasm and low back pain    Daily Treatment Log  Manuals  1/28 2/3          MLD   SJ SJ          measurements  SJ           Education   SJ          garments             Neuro Re-Ed                                                                                                        Ther Ex                                                                                                                     Ther Activity                                       Gait Training                                       Modalities

## 2025-02-07 ENCOUNTER — OFFICE VISIT (OUTPATIENT)
Age: 70
End: 2025-02-07
Payer: COMMERCIAL

## 2025-02-07 DIAGNOSIS — M25.561 RIGHT KNEE PAIN, UNSPECIFIED CHRONICITY: Primary | ICD-10-CM

## 2025-02-07 PROCEDURE — 97140 MANUAL THERAPY 1/> REGIONS: CPT

## 2025-02-07 NOTE — PROGRESS NOTES
Daily Note     Today's date: 2025  Patient name: Magno Mak  : 1955  MRN: [id number]  Referring provider: Michelle Simons PA-C  Dx:   Encounter Diagnosis     ICD-10-CM    1. Right knee pain, unspecified chronicity  M25.561                      Subjective: Patient reports has been elevating his leg at home. Reports overall feeling lighter.       Objective: See treatment diary below      Assessment: Magno Mak tolerated MLD to R LE crossing mid sagittal and R transverse  anastomosis to L inguinal and R axillary nodes. Good hydration of skin with no scar. No signs or symptoms of skin infection noted. Discussed compression and reviewed use of cream. Continued treatment should benefit.       Plan: Continue per plan of care.        POC expires Unit limit Auth Expiration date PT/OT/ST + Visit Limit?   25 4 25 9                             Visit/Unit Tracking  AUTH Status:  Date 1/21 1/28 2/3 2/7           1/21/25-25 Used 1 2 3 4            Remaining  8 7 6 5             Precautions: Complex PMH including prostate cancer, BPH, microhematuria as well as skin neoplasm and low back pain    Daily Treatment Log  Manuals  1/28 2/3 2/7         MLD   SJ SJ SJ         measurements  SJ           Education   SJ          garments             Neuro Re-Ed                                                                                                        Ther Ex                                                                                                                     Ther Activity                                       Gait Training                                       Modalities

## 2025-02-17 ENCOUNTER — OFFICE VISIT (OUTPATIENT)
Age: 70
End: 2025-02-17
Payer: COMMERCIAL

## 2025-02-17 DIAGNOSIS — M25.561 RIGHT KNEE PAIN, UNSPECIFIED CHRONICITY: Primary | ICD-10-CM

## 2025-02-17 PROCEDURE — 97140 MANUAL THERAPY 1/> REGIONS: CPT

## 2025-02-17 NOTE — PROGRESS NOTES
Daily Note     Today's date: 2025  Patient name: Magno Mak  : 1955  MRN: [id number]  Referring provider: Michelle Simons PA-C  Dx:   Encounter Diagnosis     ICD-10-CM    1. Right knee pain, unspecified chronicity  M25.561                      Subjective: Patient reports swelling is decreasing steadily.       Objective: See treatment diary below      Assessment: Magno Mak tolerated MLD to R LE crossing mid sagittal and R transverse  anastomosis to L inguinal and R axillary nodes. Good hydration of skin with no scar. No signs or symptoms of skin infection noted. Continued treatment should benefit.       Plan: Continue per plan of care.        POC expires Unit limit Auth Expiration date PT/OT/ST + Visit Limit?   25 4 25 9                             Visit/Unit Tracking  AUTH Status:  Date 1/21 1/28 2/3 2/7 2/17          1/21/25-25 Used 1 2 3 4 5           Remaining  8 7 6 5 4            Precautions: Complex PMH including prostate cancer, BPH, microhematuria as well as skin neoplasm and low back pain    Daily Treatment Log  Manuals  1/28 2/3 2/7 2/17        MLD   SJ SJ SJ SJ        measurements  SJ           Education   SJ          garments             Neuro Re-Ed                                                                                                        Ther Ex                                                                                                                     Ther Activity                                       Gait Training                                       Modalities

## 2025-02-20 ENCOUNTER — OFFICE VISIT (OUTPATIENT)
Age: 70
End: 2025-02-20
Payer: COMMERCIAL

## 2025-02-20 DIAGNOSIS — M25.561 RIGHT KNEE PAIN, UNSPECIFIED CHRONICITY: Primary | ICD-10-CM

## 2025-02-20 PROCEDURE — 97140 MANUAL THERAPY 1/> REGIONS: CPT

## 2025-02-20 NOTE — PROGRESS NOTES
Daily Note     Today's date: 2025  Patient name: Magno Mak  : 1955  MRN: [id number]  Referring provider: Michelle Simons PA-C  Dx:   Encounter Diagnosis     ICD-10-CM    1. Right knee pain, unspecified chronicity  M25.561                      Subjective: Patient reports he has noticed decreased swelling. Very happy with progress to this point.       Objective: See treatment diary below      Assessment: Magno Mak tolerated MLD to R LE crossing mid sagittal and R transverse  anastomosis to L inguinal and R axillary nodes. Good hydration of skin with no scar. No signs or symptoms of skin infection noted. Continued treatment should benefit.       Plan: Continue per plan of care.        POC expires Unit limit Auth Expiration date PT/OT/ST + Visit Limit?   25 4 25 9                             Visit/Unit Tracking  AUTH Status:  Date 1/21 1/28 2/3 2/7 2/17 2/20         1/21/25-25 Used 1 2 3 4 5 6          Remaining  8 7 6 5 4 3           Precautions: Complex PMH including prostate cancer, BPH, microhematuria as well as skin neoplasm and low back pain    Daily Treatment Log  Manuals   2/3 2/7 2/17 2/20       MLD   SJ SJ SJ SJ SJ       measurements  SJ           Education   SJ          garments             Neuro Re-Ed                                                                                                        Ther Ex                                                                                                                     Ther Activity                                       Gait Training                                       Modalities

## 2025-02-21 ENCOUNTER — OFFICE VISIT (OUTPATIENT)
Age: 70
End: 2025-02-21
Payer: COMMERCIAL

## 2025-02-21 VITALS
BODY MASS INDEX: 31.07 KG/M2 | TEMPERATURE: 98.3 F | OXYGEN SATURATION: 99 % | HEIGHT: 70 IN | HEART RATE: 70 BPM | SYSTOLIC BLOOD PRESSURE: 126 MMHG | WEIGHT: 217 LBS | DIASTOLIC BLOOD PRESSURE: 70 MMHG

## 2025-02-21 DIAGNOSIS — M79.604 PAIN IN BOTH LOWER EXTREMITIES: Primary | ICD-10-CM

## 2025-02-21 DIAGNOSIS — I83.893 VARICOSE VEINS OF BOTH LEGS WITH EDEMA: ICD-10-CM

## 2025-02-21 DIAGNOSIS — M17.12 PRIMARY OSTEOARTHRITIS OF LEFT KNEE: ICD-10-CM

## 2025-02-21 DIAGNOSIS — I83.891 VARICOSE VEINS OF RIGHT LOWER EXTREMITY WITH EDEMA: ICD-10-CM

## 2025-02-21 DIAGNOSIS — M79.605 PAIN IN BOTH LOWER EXTREMITIES: Primary | ICD-10-CM

## 2025-02-21 PROCEDURE — G2211 COMPLEX E/M VISIT ADD ON: HCPCS | Performed by: FAMILY MEDICINE

## 2025-02-21 PROCEDURE — 99214 OFFICE O/P EST MOD 30 MIN: CPT | Performed by: FAMILY MEDICINE

## 2025-02-21 NOTE — PROGRESS NOTES
"Assessment/Plan:       Diagnoses and all orders for this visit:    Pain in both lower extremities  Comments:  Meloxicam as needed.    Primary osteoarthritis of left knee  Comments:  Meloxicam as needed.  Ice as needed.  X-ray reviewed    Varicose veins of right lower extremity with edema  Comments:  Continue compression socks.  Orders:  -     Ambulatory Referral to Vascular Surgery; Future    Varicose veins of both legs with edema  Comments:  Patient will be referred to vascular surgery for evaluation  Orders:  -     Ambulatory Referral to Vascular Surgery; Future            Subjective:        Patient ID: Magno Mak is a 69 y.o. male.      Patient is here to follow-up bilateral lower extremity edema.  Patient still has swelling right leg greater than left.  Patient is wearing compression socks.  No chest pain or shortness of breath.          The following portions of the patient's history were reviewed and updated as appropriate: allergies, current medications, past family history, past medical history, past social history, past surgical history and problem list.      Review of Systems   Constitutional: Negative.    HENT: Negative.     Eyes: Negative.    Respiratory: Negative.     Cardiovascular:  Positive for leg swelling.   Gastrointestinal: Negative.    Endocrine: Negative.    Genitourinary: Negative.    Musculoskeletal: Negative.    Skin: Negative.    Allergic/Immunologic: Negative.    Neurological: Negative.    Hematological: Negative.    Psychiatric/Behavioral: Negative.             Objective:        Depression Screening and Follow-up Plan: Patient was screened for depression during today's encounter. They screened negative with a PHQ-2 score of 0.              /70 (BP Location: Left arm, Patient Position: Sitting, Cuff Size: Large)   Pulse 70   Temp 98.3 °F (36.8 °C) (Temporal)   Ht 5' 10.25\" (1.784 m)   Wt 98.4 kg (217 lb)   SpO2 99%   BMI 30.92 kg/m²          Physical Exam  Vitals and " nursing note reviewed.   Constitutional:       General: He is not in acute distress.     Appearance: Normal appearance. He is not ill-appearing, toxic-appearing or diaphoretic.   Cardiovascular:      Rate and Rhythm: Normal rate and regular rhythm.      Pulses: Normal pulses.      Heart sounds: Normal heart sounds.   Pulmonary:      Effort: Pulmonary effort is normal.      Breath sounds: Normal breath sounds.   Musculoskeletal:      Right lower leg: Edema present.      Left lower leg: Edema present.      Comments: Significant varicose veins bilaterally.  Right leg more swollen than left   Neurological:      Mental Status: He is alert.

## 2025-02-26 ENCOUNTER — OFFICE VISIT (OUTPATIENT)
Age: 70
End: 2025-02-26
Payer: COMMERCIAL

## 2025-02-26 DIAGNOSIS — M25.561 RIGHT KNEE PAIN, UNSPECIFIED CHRONICITY: Primary | ICD-10-CM

## 2025-02-26 PROCEDURE — 97140 MANUAL THERAPY 1/> REGIONS: CPT

## 2025-02-26 NOTE — PROGRESS NOTES
Daily Note     Today's date: 2025  Patient name: Magno Mak  : 1955  MRN: [id number]  Referring provider: Michelle Simons PA-C  Dx:   Encounter Diagnosis     ICD-10-CM    1. Right knee pain, unspecified chronicity  M25.561                      Subjective: Patient reports he is happy how the shape of his leg is coming along.       Objective: See treatment diary below      Assessment: Magno Mak tolerated MLD to R LE crossing mid sagittal and R transverse  anastomosis to L inguinal and R axillary nodes. Good hydration of skin with no scar. No signs or symptoms of skin infection noted. Continued treatment should benefit.       Plan: Continue per plan of care.        POC expires Unit limit Auth Expiration date PT/OT/ST + Visit Limit?   25 4 25 9                             Visit/Unit Tracking  AUTH Status:  Date  2/3 2/7 2/17 2/20 2/26        1/21/25-25 Used 1 2 3 4 5 6 7         Remaining  8 7 6 5 4 3 2          Precautions: Complex PMH including prostate cancer, BPH, microhematuria as well as skin neoplasm and low back pain    Daily Treatment Log  Manuals   2/3 2      MLD   SJ SJ SJ SJ SJ SJ      measurements  SJ           Education   SJ          garments             Neuro Re-Ed                                                                                                        Ther Ex                                                                                                                     Ther Activity                                       Gait Training                                       Modalities

## 2025-03-01 ENCOUNTER — APPOINTMENT (EMERGENCY)
Dept: CT IMAGING | Facility: HOSPITAL | Age: 70
End: 2025-03-01
Payer: COMMERCIAL

## 2025-03-01 ENCOUNTER — HOSPITAL ENCOUNTER (EMERGENCY)
Facility: HOSPITAL | Age: 70
Discharge: HOME | End: 2025-03-01
Attending: EMERGENCY MEDICINE | Admitting: EMERGENCY MEDICINE
Payer: COMMERCIAL

## 2025-03-01 VITALS
SYSTOLIC BLOOD PRESSURE: 168 MMHG | WEIGHT: 221.12 LBS | TEMPERATURE: 97.8 F | HEART RATE: 74 BPM | BODY MASS INDEX: 31.5 KG/M2 | OXYGEN SATURATION: 97 % | DIASTOLIC BLOOD PRESSURE: 80 MMHG | RESPIRATION RATE: 18 BRPM

## 2025-03-01 DIAGNOSIS — Z86.011 HX OF MENINGIOMA OF THE BRAIN: ICD-10-CM

## 2025-03-01 DIAGNOSIS — R20.2 FACIAL TINGLING SENSATION: Primary | ICD-10-CM

## 2025-03-01 LAB
ALBUMIN SERPL BCG-MCNC: 4.1 G/DL (ref 3.5–5)
ALP SERPL-CCNC: 53 U/L (ref 34–104)
ALT SERPL W P-5'-P-CCNC: 21 U/L (ref 7–52)
ANION GAP SERPL CALCULATED.3IONS-SCNC: 4 MMOL/L (ref 4–13)
AST SERPL W P-5'-P-CCNC: 26 U/L (ref 13–39)
BASOPHILS # BLD AUTO: 0.05 THOUSANDS/ÂΜL (ref 0–0.1)
BASOPHILS NFR BLD AUTO: 1 % (ref 0–1)
BILIRUB SERPL-MCNC: 1.72 MG/DL (ref 0.2–1)
BUN SERPL-MCNC: 14 MG/DL (ref 5–25)
CALCIUM SERPL-MCNC: 8.7 MG/DL (ref 8.4–10.2)
CARDIAC TROPONIN I PNL SERPL HS: 4 NG/L (ref ?–50)
CHLORIDE SERPL-SCNC: 106 MMOL/L (ref 96–108)
CO2 SERPL-SCNC: 27 MMOL/L (ref 21–32)
CREAT SERPL-MCNC: 0.76 MG/DL (ref 0.6–1.3)
EOSINOPHIL # BLD AUTO: 0.2 THOUSAND/ÂΜL (ref 0–0.61)
EOSINOPHIL NFR BLD AUTO: 4 % (ref 0–6)
ERYTHROCYTE [DISTWIDTH] IN BLOOD BY AUTOMATED COUNT: 13.5 % (ref 11.6–15.1)
GFR SERPL CREATININE-BSD FRML MDRD: 91 ML/MIN/1.73SQ M
GLUCOSE SERPL-MCNC: 88 MG/DL (ref 65–140)
HCT VFR BLD AUTO: 42.3 % (ref 36.5–49.3)
HGB BLD-MCNC: 14 G/DL (ref 12–17)
IMM GRANULOCYTES # BLD AUTO: 0.01 THOUSAND/UL (ref 0–0.2)
IMM GRANULOCYTES NFR BLD AUTO: 0 % (ref 0–2)
LYMPHOCYTES # BLD AUTO: 1.48 THOUSANDS/ÂΜL (ref 0.6–4.47)
LYMPHOCYTES NFR BLD AUTO: 26 % (ref 14–44)
MAGNESIUM SERPL-MCNC: 1.9 MG/DL (ref 1.9–2.7)
MCH RBC QN AUTO: 29.4 PG (ref 26.8–34.3)
MCHC RBC AUTO-ENTMCNC: 33.1 G/DL (ref 31.4–37.4)
MCV RBC AUTO: 89 FL (ref 82–98)
MONOCYTES # BLD AUTO: 0.5 THOUSAND/ÂΜL (ref 0.17–1.22)
MONOCYTES NFR BLD AUTO: 9 % (ref 4–12)
NEUTROPHILS # BLD AUTO: 3.36 THOUSANDS/ÂΜL (ref 1.85–7.62)
NEUTS SEG NFR BLD AUTO: 60 % (ref 43–75)
NRBC BLD AUTO-RTO: 0 /100 WBCS
PHOSPHATE SERPL-MCNC: 3.9 MG/DL (ref 2.3–4.1)
PLATELET # BLD AUTO: 241 THOUSANDS/UL (ref 149–390)
PMV BLD AUTO: 8.8 FL (ref 8.9–12.7)
POTASSIUM SERPL-SCNC: 4.9 MMOL/L (ref 3.5–5.3)
PROT SERPL-MCNC: 6.4 G/DL (ref 6.4–8.4)
RBC # BLD AUTO: 4.77 MILLION/UL (ref 3.88–5.62)
SODIUM SERPL-SCNC: 137 MMOL/L (ref 135–147)
WBC # BLD AUTO: 5.6 THOUSAND/UL (ref 4.31–10.16)

## 2025-03-01 PROCEDURE — 84100 ASSAY OF PHOSPHORUS: CPT | Performed by: EMERGENCY MEDICINE

## 2025-03-01 PROCEDURE — 36415 COLL VENOUS BLD VENIPUNCTURE: CPT | Performed by: EMERGENCY MEDICINE

## 2025-03-01 PROCEDURE — 99285 EMERGENCY DEPT VISIT HI MDM: CPT

## 2025-03-01 PROCEDURE — 93005 ELECTROCARDIOGRAM TRACING: CPT

## 2025-03-01 PROCEDURE — 99284 EMERGENCY DEPT VISIT MOD MDM: CPT | Performed by: EMERGENCY MEDICINE

## 2025-03-01 PROCEDURE — 70450 CT HEAD/BRAIN W/O DYE: CPT

## 2025-03-01 PROCEDURE — 83735 ASSAY OF MAGNESIUM: CPT | Performed by: EMERGENCY MEDICINE

## 2025-03-01 PROCEDURE — 85025 COMPLETE CBC W/AUTO DIFF WBC: CPT | Performed by: EMERGENCY MEDICINE

## 2025-03-01 PROCEDURE — 84484 ASSAY OF TROPONIN QUANT: CPT | Performed by: EMERGENCY MEDICINE

## 2025-03-01 PROCEDURE — 80053 COMPREHEN METABOLIC PANEL: CPT | Performed by: EMERGENCY MEDICINE

## 2025-03-01 NOTE — ED PROVIDER NOTES
Time reflects when diagnosis was documented in both MDM as applicable and the Disposition within this note       Time User Action Codes Description Comment    3/1/2025  5:32 PM Sandra Markham Add [R20.2] Facial tingling sensation     3/1/2025  5:32 PM WasSandra serna Add [Z98.890,  Z86.03] Hx of resection of meningioma     3/1/2025  5:32 PM Sandra Markham Remove [Z98.890,  Z86.03] Hx of resection of meningioma     3/1/2025  5:32 PM Sandra Markham Add [Z86.011] Hx of meningioma of the brain           ED Disposition       ED Disposition   Discharge    Condition   Stable    Date/Time   Sat Mar 1, 2025  5:32 PM    Comment   Magno Mak discharge to home/self care.                   Assessment & Plan       Medical Decision Making  Amount and/or Complexity of Data Reviewed  Labs: ordered.  Radiology: ordered.      Amount and/or Complexity of Data Reviewed  Clinical lab tests: ordered   Reviewed past medical records: yes    History Provided by patient    Differential considered: change in known meningioma, electrolyte abnormalities    Consideration of tests: CBC for anemia, CMP for electrolyte abnormalities, urine for ketones for dehydration   CT head for any acute changes to known meningioma on left, where patient has tingling    Labs wnl. Imaging and EKG shows no acute findings.    The patient was instructed to follow up as documented. Return precautions were provided and the patient was instructed to return to the ER immediately if symptoms worsen. The patient and/or family member acknowledged and was in agreement with the plan.           Medications - No data to display    ED Risk Strat Scores         Stroke Assessment       Row Name 03/01/25 1943             NIH Stroke Scale    Interval Baseline      Level of Consciousness (1a.) 0      LOC Questions (1b.) 0      LOC Commands (1c.) 0      Best Gaze (2.) 0      Visual (3.) 0      Facial Palsy (4.) 0      Motor Arm, Left (5a.) 0      Motor Arm, Right (5b.) 0      Motor Leg,  Left (6a.) 0      Motor Leg, Right (6b.) 0      Limb Ataxia (7.) 0      Sensory (8.) 0      Best Language (9.) 0      Dysarthria (10.) 0      Extinction and Inattention (11.) (Formerly Neglect) 0      Total 0                                                          History of Present Illness       Chief Complaint   Patient presents with    Facial Numbness     Left sided facial tingling x2 days. Also reporting b/l arm pain. RLE swelling, however improving. Hx lymphedema.      68 yo M hx of spinal stenosis, hx of R sided lymphedema (chronic), presents to ed for eval of tingling on left sided facial / ear tingling. No hearing changes. No pain. No loss of sensation. Feels like a fuzzy sensation. Patient states it feels like it is fuzzy.   Thinks he does have a meningioma on the left side  No hx of CVA  Did also have months of intermittent b/l arm pain. No chest pain. No sob. No other neuro complaints.     MRI Brain 2019 2021  Stable size of left lateral temporal region extra-axial mass compatible with meningioma.  No adjacent parenchymal edema or significant mass effect.     Past Medical History:   Diagnosis Date    Allergic     Arthritis     Knee pain     Spinal stenosis     Varicose veins of lower extremity     Visual impairment       Past Surgical History:   Procedure Laterality Date    COLONOSCOPY      EXCISIONAL HEMORRHOIDECTOMY      KNEE SURGERY      LAMINECTOMY      lumbar    AK PROSTATE NEEDLE BIOPSY ANY APPROACH N/A 5/30/2024    Procedure: TRANSPERINEAL ULTRASOUND GUIDED BIOPSY PROSTATE;  Surgeon: Pavan Tavarez MD;  Location: [facility] OR;  Service: Urology      Family History   Problem Relation Age of Onset    No Known Problems Mother     Skin cancer Sister       Social History     Tobacco Use    Smoking status: Never    Smokeless tobacco: Never   Vaping Use    Vaping status: Never Used   Substance Use Topics    Alcohol use: Yes     Comment: socially    Drug use: Never      E-Cigarette/Vaping     E-Cigarette Use Never User       E-Cigarette/Vaping Substances    Nicotine No     THC No     CBD No     Flavoring No     Other No     Unknown No       I have reviewed and agree with the history as documented.     HPI    Review of Systems   Constitutional:  Negative for chills, fatigue and fever.   HENT:  Negative for nosebleeds and sore throat.    Eyes:  Negative for redness and visual disturbance.   Respiratory:  Negative for shortness of breath and wheezing.    Cardiovascular:  Negative for chest pain and palpitations.   Gastrointestinal:  Negative for abdominal pain and diarrhea.   Endocrine: Negative for polyuria.   Genitourinary:  Negative for difficulty urinating and testicular pain.   Musculoskeletal:  Negative for back pain and neck stiffness.   Skin:  Negative for rash and wound.   Neurological:  Positive for numbness. Negative for seizures, speech difficulty and headaches.   Psychiatric/Behavioral:  Negative for dysphoric mood and hallucinations.    All other systems reviewed and are negative.          Objective       ED Triage Vitals [03/01/25 1543]   Temperature Pulse Blood Pressure Respirations SpO2 Patient Position - Orthostatic VS   97.8 °F (36.6 °C) 74 168/80 18 97 % --      Temp src Heart Rate Source BP Location FiO2 (%) Pain Score    -- -- -- -- --      Vitals      Date and Time Temp Pulse SpO2 Resp BP Pain Score FACES Pain Rating User   03/01/25 1543 97.8 °F (36.6 °C) 74 97 % 18 168/80 -- -- TRIHS            Physical Exam  Vitals and nursing note reviewed.   HENT:      Head: Normocephalic and atraumatic.      Right Ear: External ear normal.      Left Ear: External ear normal.   Eyes:      Conjunctiva/sclera: Conjunctivae normal.   Cardiovascular:      Rate and Rhythm: Normal rate and regular rhythm.      Heart sounds: Normal heart sounds.   Pulmonary:      Effort: Pulmonary effort is normal.      Breath sounds: Normal breath sounds.   Abdominal:      General: There is no distension.      Tenderness:  There is no guarding.   Musculoskeletal:         General: Normal range of motion.      Cervical back: Normal range of motion.   Skin:     General: Skin is warm and dry.      Findings: No rash.   Neurological:      Mental Status: He is alert and oriented to person, place, and time.      Cranial Nerves: No cranial nerve deficit.      Sensory: No sensory deficit.      Motor: No abnormal muscle tone.      Coordination: Coordination normal.      Comments: Mental Status: Alert and oriented to person place time and situation, language fluent with good comprehension and repetition.   CN: PERRLA, visual fields full to finger counting bilaterally, extraocular muscles intact without nystagmus. Face symmetrical with full sensation. Hearing in tact to bilateral finger rub. Tongue protrudes midline and palate elevates symmetrically. Sternocleidomastoid and trapezius muscle have full strength bilaterally.  Motor: Normal muscle bulk and tone throughout 5/5 strength in upper and lower extremities throughout. No clonus present.   Sensory: Sensation intact to light touch.   Coordination: Able to perform finger to nose to finger  Gait at baseline             Results Reviewed       Procedure Component Value Units Date/Time    Comprehensive metabolic panel [[id number]]  (Abnormal) Collected: 03/01/25 1640    Lab Status: Final result Specimen: Blood from Arm, Right Updated: 03/01/25 1715     Sodium 137 mmol/L      Potassium 4.9 mmol/L      Chloride 106 mmol/L      CO2 27 mmol/L      ANION GAP 4 mmol/L      BUN 14 mg/dL      Creatinine 0.76 mg/dL      Glucose 88 mg/dL      Calcium 8.7 mg/dL      AST 26 U/L      ALT 21 U/L      Alkaline Phosphatase 53 U/L      Total Protein 6.4 g/dL      Albumin 4.1 g/dL      Total Bilirubin 1.72 mg/dL      eGFR 91 ml/min/1.73sq m     Narrative:      National Kidney Disease Foundation guidelines for Chronic Kidney Disease (CKD):     Stage 1 with normal or high GFR (GFR > 90 mL/min/1.73 square meters)     Stage 2 Mild CKD (GFR = 60-89 mL/min/1.73 square meters)    Stage 3A Moderate CKD (GFR = 45-59 mL/min/1.73 square meters)    Stage 3B Moderate CKD (GFR = 30-44 mL/min/1.73 square meters)    Stage 4 Severe CKD (GFR = 15-29 mL/min/1.73 square meters)    Stage 5 End Stage CKD (GFR <15 mL/min/1.73 square meters)  Note: GFR calculation is accurate only with a steady state creatinine    Phosphorus [[id number]]  (Normal) Collected: 03/01/25 1640    Lab Status: Final result Specimen: Blood from Arm, Right Updated: 03/01/25 1715     Phosphorus 3.9 mg/dL     Magnesium [[id number]]  (Normal) Collected: 03/01/25 1640    Lab Status: Final result Specimen: Blood from Arm, Right Updated: 03/01/25 1715     Magnesium 1.9 mg/dL     HS Troponin 0hr (reflex protocol) [[id number]]  (Normal) Collected: 03/01/25 1640    Lab Status: Final result Specimen: Blood from Arm, Right Updated: 03/01/25 1707     hs TnI 0hr 4 ng/L     CBC and differential [[id number]]  (Abnormal) Collected: 03/01/25 1640    Lab Status: Final result Specimen: Blood from Arm, Right Updated: 03/01/25 1646     WBC 5.60 Thousand/uL      RBC 4.77 Million/uL      Hemoglobin 14.0 g/dL      Hematocrit 42.3 %      MCV 89 fL      MCH 29.4 pg      MCHC 33.1 g/dL      RDW 13.5 %      MPV 8.8 fL      Platelets 241 Thousands/uL      nRBC 0 /100 WBCs      Segmented % 60 %      Immature Grans % 0 %      Lymphocytes % 26 %      Monocytes % 9 %      Eosinophils Relative 4 %      Basophils Relative 1 %      Absolute Neutrophils 3.36 Thousands/µL      Absolute Immature Grans 0.01 Thousand/uL      Absolute Lymphocytes 1.48 Thousands/µL      Absolute Monocytes 0.50 Thousand/µL      Eosinophils Absolute 0.20 Thousand/µL      Basophils Absolute 0.05 Thousands/µL             CT head without contrast   Final Interpretation by Magno De Jesus MD (03/01 1720)      No acute intracranial abnormality.   Grossly stable left temporal meningioma               Workstation performed: [id number]              Procedures    ED Medication and Procedure Management   Prior to Admission Medications   Prescriptions Last Dose Informant Patient Reported? Taking?   Elastic Bandages & Supports (T.E.D. Anti-Embolism Stockings) MISC   No No   Sig: Use 1 Application daily   betamethasone, augmented, (DIPROLENE-AF) 0.05 % cream  Self Yes No   ciclopirox (PENLAC) 8 % solution  Self Yes No   meloxicam (Mobic) 15 mg tablet   No No   Sig: Take 1 tablet (15 mg total) by mouth daily   sodium chloride (DIANA 128) 5 % hypertonic ophthalmic ointment  Self Yes No   Sig: Administer 1 drop to both eyes daily   tamsulosin (FLOMAX) 0.4 mg   No No   Sig: Take 2 capsules (0.8 mg total) by mouth daily with dinner   urea (CARMOL) 40 %  Self No No   Sig: Apply topically daily      Facility-Administered Medications: None     Discharge Medication List as of 3/1/2025  5:33 PM        CONTINUE these medications which have NOT CHANGED    Details   betamethasone, augmented, (DIPROLENE-AF) 0.05 % cream Historical Med      ciclopirox (PENLAC) 8 % solution Historical Med      Elastic Bandages & Supports (T.E.D. Anti-Embolism Stockings) MISC Use 1 Application daily, Starting Wed 1/22/2025, Print      meloxicam (Mobic) 15 mg tablet Take 1 tablet (15 mg total) by mouth daily, Starting Tue 1/21/2025, Normal      sodium chloride (DIANA 128) 5 % hypertonic ophthalmic ointment Administer 1 drop to both eyes daily, Historical Med      tamsulosin (FLOMAX) 0.4 mg Take 2 capsules (0.8 mg total) by mouth daily with dinner, Starting Tue 1/28/2025, Normal      urea (CARMOL) 40 % Apply topically daily, Starting Tue 8/16/2022, Normal           No discharge procedures on file.  ED SEPSIS DOCUMENTATION   Time reflects when diagnosis was documented in both MDM as applicable and the Disposition within this note       Time User Action Codes Description Comment    3/1/2025  5:32 PM Sandra Markham [R20.2] Facial tingling sensation     3/1/2025  5:32 PM Sandra Markham [Z98.890,   Z86.03] Hx of resection of meningioma     3/1/2025  5:32 PM Sandra Markham Remove [Z98.890,  Z86.03] Hx of resection of meningioma     3/1/2025  5:32 PM Sandra Markham Add [Z86.011] Hx of meningioma of the brain                  Sandra Markham MD  03/01/25 1947

## 2025-03-03 ENCOUNTER — TELEPHONE (OUTPATIENT)
Age: 70
End: 2025-03-03

## 2025-03-03 ENCOUNTER — VBI (OUTPATIENT)
Age: 70
End: 2025-03-03

## 2025-03-03 LAB
ATRIAL RATE: 66 BPM
P AXIS: 76 DEGREES
PR INTERVAL: 138 MS
QRS AXIS: 61 DEGREES
QRSD INTERVAL: 98 MS
QT INTERVAL: 382 MS
QTC INTERVAL: 400 MS
T WAVE AXIS: 52 DEGREES
VENTRICULAR RATE: 66 BPM

## 2025-03-03 PROCEDURE — 93010 ELECTROCARDIOGRAM REPORT: CPT | Performed by: STUDENT IN AN ORGANIZED HEALTH CARE EDUCATION/TRAINING PROGRAM

## 2025-03-03 NOTE — TELEPHONE ENCOUNTER
Appointment Information   Name: Magno Mak MRN: [id number]   Date: 4/3/2025 Status: [facility]   Time: 1:00 PM Length: 60   Visit Type: NEW PATIENT PG [[id number]] Copay: $25.00   Provider: RICKY Fairbanks

## 2025-03-03 NOTE — TELEPHONE ENCOUNTER
03/03/25 9:37 AM    Patient contacted post ED visit, [facility] department spoke with patient/caregiver and outreach was successful.    Thank you.  Shannon Haque MA  [facility]

## 2025-03-04 ENCOUNTER — OFFICE VISIT (OUTPATIENT)
Age: 70
End: 2025-03-04
Payer: COMMERCIAL

## 2025-03-04 DIAGNOSIS — M25.561 RIGHT KNEE PAIN, UNSPECIFIED CHRONICITY: Primary | ICD-10-CM

## 2025-03-04 PROCEDURE — 97140 MANUAL THERAPY 1/> REGIONS: CPT

## 2025-03-04 NOTE — PROGRESS NOTES
Daily Note     Today's date: 3/4/2025  Patient name: Magno Mak  : 1955  MRN: [id number]  Referring provider: Michelle Simons PA-C  Dx:   Encounter Diagnosis     ICD-10-CM    1. Right knee pain, unspecified chronicity  M25.561                      Subjective: Patient reports doing well with moving around. His leg is taking good shape.       Objective: See treatment diary below      Assessment: Magno Mak tolerated MLD to R LE crossing mid sagittal and R transverse  anastomosis to L inguinal and R axillary nodes. Good hydration of skin with no scar. No signs or symptoms of skin infection noted. Continued treatment should benefit.       Plan: Continue per plan of care.        POC expires Unit limit Auth Expiration date PT/OT/ST + Visit Limit?   25 4 25 9                             Visit/Unit Tracking  AUTH Status:  Date  2/3 2 3/4       25-25 Used 1 2 3 4 5 6 7 8        Remaining  8 7 6 5 4 3 2 1         Precautions: Complex PMH including prostate cancer, BPH, microhematuria as well as skin neoplasm and low back pain    Daily Treatment Log  Manuals   2/3 2/ 3/4     MLD   SJ SJ SJ SJ SJ SJ SJ     measurements  SJ           Education   SJ          garments             Neuro Re-Ed                                                                                                        Ther Ex                                                                                                                     Ther Activity                                       Gait Training                                       Modalities

## 2025-03-07 ENCOUNTER — TELEPHONE (OUTPATIENT)
Age: 70
End: 2025-03-07

## 2025-03-07 NOTE — TELEPHONE ENCOUNTER
Patient was calling because he was almost out of his medication. Advised it was sent in for a 90 day supply back on 1/28, it should last him til the end of April before he needs a refill.     Patient stated he was unsure if the pharmacy gave him 1 bottle of 2. He will call them to double check.     Milagro assisted with looking up hours for the [facility] labs on [address].

## 2025-03-08 ENCOUNTER — APPOINTMENT (OUTPATIENT)
Age: 70
End: 2025-03-08
Payer: COMMERCIAL

## 2025-03-08 DIAGNOSIS — C61 PROSTATE CANCER (HCC): ICD-10-CM

## 2025-03-08 LAB — PSA SERPL-MCNC: 5.39 NG/ML (ref 0–4)

## 2025-03-08 PROCEDURE — 36415 COLL VENOUS BLD VENIPUNCTURE: CPT

## 2025-03-08 PROCEDURE — 84153 ASSAY OF PSA TOTAL: CPT

## 2025-03-10 ENCOUNTER — RESULTS FOLLOW-UP (OUTPATIENT)
Dept: UROLOGY | Facility: AMBULATORY SURGERY CENTER | Age: 70
End: 2025-03-10

## 2025-03-11 ENCOUNTER — APPOINTMENT (OUTPATIENT)
Age: 70
End: 2025-03-11
Payer: COMMERCIAL

## 2025-03-12 ENCOUNTER — EVALUATION (OUTPATIENT)
Age: 70
End: 2025-03-12
Payer: COMMERCIAL

## 2025-03-12 DIAGNOSIS — M25.561 RIGHT KNEE PAIN, UNSPECIFIED CHRONICITY: Primary | ICD-10-CM

## 2025-03-13 NOTE — PROGRESS NOTES
PT Re-Evaluation    Today's date: 3/13/2025  Patient name: Magno Mak  : 1955  MRN: [id number]  Referring provider: Michelle Simons PA-C  Dx:   Encounter Diagnosis     ICD-10-CM    1. Right knee pain, unspecified chronicity  M25.561                      Subjective: Patient reports he is happy with shape leg is now.       Objective: See treatment diary below    LOWER EXTREMITY RIGHT CALCULATIONS      Flowsheet Row Most Recent Value   Volume LE (mL) 6912   Difference from last visit (mL)  342   Difference from first visit (mL)  342   Difference from last visit (%)  5   Difference from first visit (%)  5              Assessment: Magno Mak has had a good reduction in swelling. Lymphedema has receded and is currently located in ankle area. Continued treatment indicated.        Plan: Continue per plan of care.        POC expires Unit limit Auth Expiration date PT/OT/ST + Visit Limit?   25 4 25 9                             Visit/Unit Tracking  AUTH Status:  Date 1/21 1/28 2/3 2/7 2/17 2/20 2/26 3/4 3/12      1/21/25-25 Used 1 2 3 4 5 6 7 8 9       Remaining  8 7 6 5 4 3 2 1 RE        Precautions: Complex PMH including prostate cancer, BPH, microhematuria as well as skin neoplasm and low back pain    Daily Treatment Log  Manuals   2/3 2/ 3/4 3/12    MLD   SJ SJ SJ SJ SJ SJ SJ SJ    measurements  SJ       SJ    Education   SJ          garments             Neuro Re-Ed                                                                                                        Ther Ex                                                                                                                     Ther Activity                                       Gait Training                                       Modalities

## 2025-03-14 NOTE — ASSESSMENT & PLAN NOTE
Follow up and surveillance of his lateral temporal region extra-axial mengioma  Lost to follow up, last seen in 2021 with plans for a 2 year follow up.  Recently in the ED for intermittent left ear tingling (pinna) that sometimes radiates to the jaw/neck  Exam: non-focal     Plan:  Given no recent MRI, recommend updated MRI brain w/wo   For right ear itching, would see dermatology  Would keep appointment with neurology for peripheral neuropathy.  Can consider ENT evaluation for his left ear  Follow-up once imaging completed to see AP.  Call sooner with any questions or concerns.    Orders:    BUN; Future    Creatinine, serum; Future    MRI brain w wo contrast; Future

## 2025-03-17 ENCOUNTER — OFFICE VISIT (OUTPATIENT)
Dept: NEUROSURGERY | Facility: CLINIC | Age: 70
End: 2025-03-17
Payer: COMMERCIAL

## 2025-03-17 VITALS
DIASTOLIC BLOOD PRESSURE: 66 MMHG | WEIGHT: 219 LBS | SYSTOLIC BLOOD PRESSURE: 126 MMHG | HEART RATE: 82 BPM | BODY MASS INDEX: 29.03 KG/M2 | HEIGHT: 73 IN | OXYGEN SATURATION: 97 % | TEMPERATURE: 98.3 F | RESPIRATION RATE: 16 BRPM

## 2025-03-17 DIAGNOSIS — D32.9 MENINGIOMA (HCC): Primary | ICD-10-CM

## 2025-03-17 PROCEDURE — 99203 OFFICE O/P NEW LOW 30 MIN: CPT | Performed by: PHYSICIAN ASSISTANT

## 2025-03-17 NOTE — PROGRESS NOTES
Name: Magno Mak      : 1955      MRN: [id number]  Encounter Provider: Gloria Menard PA-C  Encounter Date: 3/17/2025   Encounter department: [facility]  :  Assessment & Plan  Meningioma (HCC)  Follow up and surveillance of his lateral temporal region extra-axial mengioma  Lost to follow up, last seen in  with plans for a 2 year follow up.  Recently in the ED for intermittent left ear tingling (pinna) that sometimes radiates to the jaw/neck  Exam: non-focal     Plan:  Given no recent MRI, recommend updated MRI brain w/wo   For right ear itching, would see dermatology  Would keep appointment with neurology for peripheral neuropathy.  Can consider ENT evaluation for his left ear  Follow-up once imaging completed to see AP.  Call sooner with any questions or concerns.    Orders:    BUN; Future    Creatinine, serum; Future    MRI brain w wo contrast; Future        History of Present Illness     Magno Mak is a 69 y.o. male who presents for follow up of a meningioma. Last seen in  with plans for a 2 year follow up, though he was lost to follow up. He was recently in the ED for tingling on the outside of the left ear. It isn't there constantly. It seems to happen more at night when sitting upright in the chair. It runs down into the jaw and into the neck. There is also a numbness sensation. Not as often in the neck. Notes itching in the right ear and a low rumbling in his ear intermittently as well as random pulsing sound that is rare. Notes occasional issues with word finding. No headache, seizures, vision changes.    HPI     Review of Systems   HENT:  Positive for tinnitus (right ear whooshing).         Right ear itching   Eyes:  Negative for visual disturbance.   Respiratory:  Negative for chest tightness and shortness of breath.    Gastrointestinal: Negative.    Genitourinary: Negative.    Musculoskeletal:  Negative for gait problem.        Jumpy legs  "  Neurological:  Positive for speech difficulty (occasional trouble word finding) and numbness (left face tingling). Negative for dizziness, seizures, weakness, light-headedness and headaches.   Hematological:  Does not bruise/bleed easily.   Psychiatric/Behavioral:  Negative for confusion and decreased concentration.      I have personally reviewed the MA's review of systems and made changes as necessary.    Past Medical History   Past Medical History:   Diagnosis Date    Allergic     Arthritis     Knee pain     Spinal stenosis     Varicose veins of lower extremity     Visual impairment      Past Surgical History:   Procedure Laterality Date    COLONOSCOPY      EXCISIONAL HEMORRHOIDECTOMY      KNEE SURGERY      LAMINECTOMY      lumbar    MS PROSTATE NEEDLE BIOPSY ANY APPROACH N/A 5/30/2024    Procedure: TRANSPERINEAL ULTRASOUND GUIDED BIOPSY PROSTATE;  Surgeon: Pavan Tavarez MD;  Location: [facility] OR;  Service: Urology     Family History   Problem Relation Age of Onset    No Known Problems Mother     Skin cancer Sister      he reports that he has never smoked. He has never used smokeless tobacco. He reports current alcohol use. He reports that he does not use drugs.  Current Outpatient Medications   Medication Instructions    betamethasone, augmented, (DIPROLENE-AF) 0.05 % cream     ciclopirox (PENLAC) 8 % solution     Elastic Bandages & Supports (T.E.D. Anti-Embolism Stockings) MISC 1 Application, Does not apply, Daily    meloxicam (MOBIC) 15 mg, Oral, Daily    sodium chloride (DIANA 128) 5 % hypertonic ophthalmic ointment 1 drop, Daily    tamsulosin (FLOMAX) 0.8 mg, Oral, Daily with dinner    urea (CARMOL) 40 % Topical, Daily   No Known Allergies   Objective   /66 (BP Location: Left arm, Patient Position: Sitting, Cuff Size: Standard)   Pulse 82   Temp 98.3 °F (36.8 °C) (Temporal)   Resp 16   Ht 6' 1\" (1.854 m)   Wt 99.3 kg (219 lb)   SpO2 97%   BMI 28.89 kg/m²     Physical Exam  Vitals " reviewed.   Constitutional:       General: He is awake.      Appearance: Normal appearance.   HENT:      Head: Normocephalic and atraumatic.   Eyes:      Extraocular Movements: Extraocular movements intact.      Conjunctiva/sclera: Conjunctivae normal.      Pupils: Pupils are equal, round, and reactive to light.   Cardiovascular:      Rate and Rhythm: Normal rate.   Pulmonary:      Effort: Pulmonary effort is normal.   Skin:     General: Skin is warm and dry.   Neurological:      Mental Status: He is alert.      Deep Tendon Reflexes:      Reflex Scores:       Bicep reflexes are 2+ on the right side and 2+ on the left side.       Brachioradialis reflexes are 2+ on the right side and 2+ on the left side.       Patellar reflexes are 1+ on the right side and 2+ on the left side.  Psychiatric:         Attention and Perception: Attention and perception normal.         Mood and Affect: Mood and affect normal.         Speech: Speech normal.         Behavior: Behavior normal. Behavior is cooperative.         Thought Content: Thought content normal.         Cognition and Memory: Cognition and memory normal.         Judgment: Judgment normal.       Neurological Exam  Mental Status  Awake and alert. Memory is normal. Recent and remote memory are intact. Speech is normal. Language is fluent with no aphasia. Attention and concentration are normal. Fund of knowledge is appropriate for level of education.    Cranial Nerves  CN III, IV, VI: Extraocular movements intact bilaterally. Pupils equal round and reactive to light bilaterally.  CN V:  Right: Facial sensation is normal.  Left: Facial sensation is normal on the left.  CN VII: Full and symmetric facial movement.  CN VIII: Hearing is normal.  CN XI:  Right: Trapezius strength is normal.  Left: Trapezius strength is normal.  CN XII: Tongue midline without atrophy or fasciculations.    Motor  Normal muscle bulk throughout. Normal muscle tone. No pronator drift.                                              Right                     Left  Hip flexion                              5                          5  Plantarflexion                         5                          5  Dorsiflexion                            5                          5                                             Right                     Left   Biceps                                   5                          5   Triceps                                  5                          5   5/5 bilaterally .    Sensory  Light touch is normal in upper and lower extremities.     Reflexes                                            Right                      Left  Brachioradialis                    2+                         2+  Biceps                                 2+                         2+  Patellar                                1+                         2+    Right pathological reflexes: Willi's absent.  Left pathological reflexes: Willi's absent.    Coordination  Right: Finger-to-nose normal.Left: Finger-to-nose normal.    Gait  Casual gait is normal including stance, stride, and arm swing.

## 2025-03-18 ENCOUNTER — OFFICE VISIT (OUTPATIENT)
Age: 70
End: 2025-03-18
Payer: COMMERCIAL

## 2025-03-18 DIAGNOSIS — M25.561 RIGHT KNEE PAIN, UNSPECIFIED CHRONICITY: Primary | ICD-10-CM

## 2025-03-18 PROCEDURE — 97140 MANUAL THERAPY 1/> REGIONS: CPT

## 2025-03-18 NOTE — PROGRESS NOTES
Daily Note     Today's date: 3/18/2025  Patient name: Magno Mak  : 1955  MRN: [id number]  Referring provider: Michelle Simons PA-C  Dx:   Encounter Diagnosis     ICD-10-CM    1. Right knee pain, unspecified chronicity  M25.561                      Subjective: Patient reports ankle getting better everyday.      Objective: See treatment diary below      Assessment: Magno Mak tolerated MLD to R LE crossing mid sagittal and R transverse  anastomosis to L inguinal and R axillary nodes. Good hydration of skin with no scar. No signs or symptoms of skin infection noted. Continued treatment should benefit.       Plan: Continue per plan of care.        POC expires Unit limit Auth Expiration date PT/OT/ST + Visit Limit?   25 4 25 9                             Visit/Unit Tracking  AUTH Status:  Date  2/3 2 3/4 3/12 3/18     1/21/25-25 Used 1 2 3 4 5 6 7 8 9 10      Remaining  8 7 6 5 4 3 2 1 RE 10       Precautions: Complex PMH including prostate cancer, BPH, microhematuria as well as skin neoplasm and low back pain    Daily Treatment Log  Manuals   2/3 2 3/4 3/12 3/18   MLD   SJ SJ SJ SJ SJ SJ SJ SJ SJ   measurements  SJ       SJ    Education   SJ          garments             Neuro Re-Ed                                                                                                        Ther Ex                                                                                                                     Ther Activity                                       Gait Training                                       Modalities

## 2025-03-24 ENCOUNTER — APPOINTMENT (OUTPATIENT)
Age: 70
End: 2025-03-24
Payer: COMMERCIAL

## 2025-03-27 ENCOUNTER — OFFICE VISIT (OUTPATIENT)
Age: 70
End: 2025-03-27
Payer: COMMERCIAL

## 2025-03-27 DIAGNOSIS — M25.561 RIGHT KNEE PAIN, UNSPECIFIED CHRONICITY: Primary | ICD-10-CM

## 2025-03-27 PROCEDURE — 97140 MANUAL THERAPY 1/> REGIONS: CPT

## 2025-03-27 NOTE — PROGRESS NOTES
Daily Note     Today's date: 3/27/2025  Patient name: Magno Mak  : 1955  MRN: [id number]  Referring provider: Michelle Simons PA-C  Dx:   Encounter Diagnosis     ICD-10-CM    1. Right knee pain, unspecified chronicity  M25.561                      Subjective: Patient reports he has noted legs are pretty close in size.      Objective: See treatment diary below      Assessment: Magno Mak tolerated MLD to R LE crossing mid sagittal and R transverse  anastomosis to L inguinal and R axillary nodes. Good hydration of skin with no scar. No signs or symptoms of skin infection noted. Anticipate D/C in 2 visits to maintenance program.       Plan:  Conitnue 2 more visits then D/C to maintenance program.        POC expires Unit limit Auth Expiration date PT/OT/ST + Visit Limit?   25 4 25 9                             Visit/Unit Tracking  AUTH Status:  Date 1/21 1/28 2/3 2/7 2/17 2/20 2/26 3/4 3/12 3/18 3/27    1/21/25-25 Used 1 2 3 4 5 6 7 8 9 10 11     Remaining  8 7 6 5 4 3 2 1 RE 10 9      Precautions: Complex PMH including prostate cancer, BPH, microhematuria as well as skin neoplasm and low back pain    Daily Treatment Log  Manuals 3/27 1/28 2/3 2 3/4 3/12 3/18   MLD  SJ SJ SJ SJ SJ SJ SJ SJ SJ SJ   measurements  SJ       SJ    Education   SJ          garments             Neuro Re-Ed                                                                                                        Ther Ex                                                                                                                     Ther Activity                                       Gait Training                                       Modalities

## 2025-04-01 ENCOUNTER — HOSPITAL ENCOUNTER (OUTPATIENT)
Dept: MRI IMAGING | Facility: HOSPITAL | Age: 70
Discharge: HOME | End: 2025-04-01
Payer: COMMERCIAL

## 2025-04-01 ENCOUNTER — OFFICE VISIT (OUTPATIENT)
Age: 70
End: 2025-04-01
Payer: COMMERCIAL

## 2025-04-01 DIAGNOSIS — M25.561 RIGHT KNEE PAIN, UNSPECIFIED CHRONICITY: Primary | ICD-10-CM

## 2025-04-01 DIAGNOSIS — D32.9 MENINGIOMA (HCC): ICD-10-CM

## 2025-04-01 PROCEDURE — 70553 MRI BRAIN STEM W/O & W/DYE: CPT

## 2025-04-01 PROCEDURE — 97140 MANUAL THERAPY 1/> REGIONS: CPT

## 2025-04-01 PROCEDURE — A9585 GADOBUTROL INJECTION: HCPCS | Performed by: PHYSICIAN ASSISTANT

## 2025-04-01 RX ORDER — GADOBUTROL 604.72 MG/ML
10 INJECTION INTRAVENOUS
Status: COMPLETED | OUTPATIENT
Start: 2025-04-01 | End: 2025-04-01

## 2025-04-01 RX ADMIN — GADOBUTROL 10 ML: 604.72 INJECTION INTRAVENOUS at 16:28

## 2025-04-01 NOTE — PROGRESS NOTES
Daily Note     Today's date: 2025  Patient name: Magno Mak  : 1955  MRN: [id number]  Referring provider: Michelle iSmons PA-C  Dx:   Encounter Diagnosis     ICD-10-CM    1. Right knee pain, unspecified chronicity  M25.561                      Subjective: Patient reports he is feeling less swelling and is lasting longer.      Objective: See treatment diary below      Assessment: Magno Mak tolerated MLD to R LE crossing mid sagittal and R transverse  anastomosis to L inguinal and R axillary nodes. Good hydration of skin with no scar. No signs or symptoms of skin infection noted. Anticipate D/C in 1 visit to maintenance program.       Plan: Continue per plan of care.        POC expires Unit limit Auth Expiration date PT/OT/ST + Visit Limit?   25 4 25 9                             Visit/Unit Tracking  AUTH Status:  Date 1/21 1/28 2/3 2/7 2/17 2/20 2/26 3/4 3/12 3/18 3/27 4/1   1/21/25-25 Used 1 2 3 4 5 6 7 8 9 10 11 12    Remaining  8 7 6 5 4 3 2 1 RE 10 9 8     Precautions: Complex PMH including prostate cancer, BPH, microhematuria as well as skin neoplasm and low back pain    Daily Treatment Log  Manuals 3/27 4/1 2/3 2/7 2/17 2/20 2/26 3/4 3/12 3/18   MLD  SJ SJ SJ SJ SJ SJ SJ SJ SJ SJ   measurements         SJ    Education   SJ          garments             Neuro Re-Ed                                                                                                        Ther Ex                                                                                                                     Ther Activity                                       Gait Training                                       Modalities

## 2025-04-03 ENCOUNTER — OFFICE VISIT (OUTPATIENT)
Dept: NEUROLOGY | Facility: CLINIC | Age: 70
End: 2025-04-03
Payer: COMMERCIAL

## 2025-04-03 VITALS
SYSTOLIC BLOOD PRESSURE: 128 MMHG | HEART RATE: 75 BPM | TEMPERATURE: 98.1 F | HEIGHT: 73 IN | WEIGHT: 218 LBS | DIASTOLIC BLOOD PRESSURE: 80 MMHG | BODY MASS INDEX: 28.89 KG/M2

## 2025-04-03 DIAGNOSIS — G60.9 HEREDITARY AND IDIOPATHIC NEUROPATHY, UNSPECIFIED: ICD-10-CM

## 2025-04-03 DIAGNOSIS — R20.0 FACIAL NUMBNESS: Primary | ICD-10-CM

## 2025-04-03 PROCEDURE — 99204 OFFICE O/P NEW MOD 45 MIN: CPT | Performed by: NURSE PRACTITIONER

## 2025-04-03 NOTE — PROGRESS NOTES
"Name: Magno Mak      : 1955      MRN: [id number]  Encounter Provider: RICKY Fairbanks  Encounter Date: 4/3/2025   Encounter department: NEUROLOGY [facility]  :  Assessment & Plan          History of Present Illness   HPI   Review of Systems   Constitutional:  Negative for appetite change and fever.   HENT: Negative.  Negative for hearing loss, tinnitus, trouble swallowing and voice change.    Eyes: Negative.  Negative for photophobia and pain.   Respiratory: Negative.  Negative for shortness of breath.    Cardiovascular: Negative.  Negative for palpitations.   Gastrointestinal: Negative.  Negative for nausea and vomiting.   Endocrine: Negative.  Negative for cold intolerance.   Genitourinary: Negative.  Negative for dysuria, frequency and urgency.   Musculoskeletal: Negative.  Negative for myalgias and neck pain.   Skin: Negative.  Negative for rash.   Neurological:  Positive for numbness (left ear and neck and fingers). Negative for dizziness, tremors, seizures, syncope, facial asymmetry, speech difficulty, weakness, light-headedness and headaches.   Hematological: Negative.  Does not bruise/bleed easily.   Psychiatric/Behavioral: Negative.  Negative for confusion, hallucinations and sleep disturbance.    All other systems reviewed and are negative.   I have personally reviewed the MA's review of systems and made changes as necessary.         Objective   /80 (BP Location: Left arm, Patient Position: Sitting, Cuff Size: Standard)   Pulse 75   Temp 98.1 °F (36.7 °C) (Temporal)   Ht 6' 1\" (1.854 m)   Wt 98.9 kg (218 lb)   BMI 28.76 kg/m²     Physical Exam  Neurological Exam          "

## 2025-04-03 NOTE — PROGRESS NOTES
Name: Magno Mak      : 1955      MRN: [id number]  Encounter Provider: RICKY Fairbanks  Encounter Date: 4/3/2025   Encounter department: NEUROLOGY [facility]  :  Assessment & Plan  Facial numbness  Patient with intermittent numbness and tingling in the left ear, jaw and extending into the neck at times that started last month.  He denies any progression of the symptoms.  He did have a repeat MRI brain that did show increasing size of his known meningioma and additional smaller meningiomas in which he is going to discuss with neurosurgery next week.    Given some of his symptoms are in a cervical dermatome and will check cervical spine MRI   Along with labs.  At this time he does feel his symptoms are not significant enough to warrant any medication for neuropathic pain.  Orders:    MRI cervical spine with and without contrast; Future    Vitamin B12; Future    Vitamin B6; Future    Sedimentation rate, automated; Future    Hereditary and idiopathic neuropathy, unspecified    Orders:    Vitamin B6; Future          History of Present Illness   HPI   Patient is a 69-year-old male with past medical history of meningioma, lumbar radiculopathy, BPH, prostate cancer who presents for evaluation of numbness and tingling.      He was seen in the ER in March for left facial numbness and tingling.  CT head at the time with no acute findings.  He states symptoms started a few days prior to that.  Symptoms have been intermittent since that time. Is episodic, tends to more so occur in the evening when watching tv.   He notes sometimes   Talking/chewing does not trigger symptoms.   He feels a tingling in the left ear, sometimes in the jaw, and down the neck.  No neck pain. No arm weakness.     No headaches.     He does get pain in the arms/hands when sleeping.   He does have carpal tunnel, is supposed to wear wrist splints.     He does have a history of meningioma, follows with neurosurgery, he did  have a repeat MRI brain which did show a slight increase in size of his left temporal meningioma.    Non smoker, 1 beer/wine per day.  Sister with history of brain tumors (under if benign or cancerous)      Prior work up:  Labs 12/2024: A1c 5.4, TSH normal     Mri brain 4/2025: 1.  Slight increase in size of the dominant left temporal convexity meningioma, without surrounding edema or mass effect.  2.  Additional smaller meningiomas along the left temporal convexity, posterior and inferior to the dominant lesion, 1 of which is slightly larger, while the second appears new.  3.  No acute infarct. Stable mild chronic white matter microangiopathic changes.    Review of Systems   Constitutional:  Negative for appetite change and fever.   HENT: Negative.  Negative for hearing loss, tinnitus, trouble swallowing and voice change.    Eyes: Negative.  Negative for photophobia and pain.   Respiratory: Negative.  Negative for shortness of breath.    Cardiovascular: Negative.  Negative for palpitations.   Gastrointestinal: Negative.  Negative for nausea and vomiting.   Endocrine: Negative.  Negative for cold intolerance.   Genitourinary: Negative.  Negative for dysuria, frequency and urgency.   Musculoskeletal: Negative.  Negative for myalgias and neck pain.   Skin: Negative.  Negative for rash.   Neurological:  Positive for numbness (left ear and neck and fingers). Negative for dizziness, tremors, seizures, syncope, facial asymmetry, speech difficulty, weakness, light-headedness and headaches.   Hematological: Negative.  Does not bruise/bleed easily.   Psychiatric/Behavioral: Negative.  Negative for confusion, hallucinations and sleep disturbance.    All other systems reviewed and are negative.   I have personally reviewed the MA's review of systems and made changes as necessary.  I have personally reviewed the MA's review of systems and made changes as necessary.         Objective   There were no vitals taken for this  visit.    Physical Exam  Constitutional:       General: He is awake.   HENT:      Right Ear: Hearing normal.      Left Ear: Hearing normal.   Eyes:      General: Lids are normal.      Extraocular Movements: Extraocular movements intact.      Pupils: Pupils are equal, round, and reactive to light.   Neurological:      Mental Status: He is alert.      Deep Tendon Reflexes:      Reflex Scores:       Bicep reflexes are 2+ on the right side and 2+ on the left side.       Brachioradialis reflexes are 2+ on the right side and 2+ on the left side.       Patellar reflexes are 2+ on the right side and 2+ on the left side.       Achilles reflexes are 1+ on the right side and 1+ on the left side.  Psychiatric:         Speech: Speech normal.       Neurological Exam  Mental Status  Awake and alert. Oriented to person, place, time and situation. Speech is normal. Language is fluent with no aphasia.    Cranial Nerves  CN II: Visual fields full to confrontation.  CN III, IV, VI: Extraocular movements intact bilaterally. Normal lids and orbits bilaterally. Pupils equal round and reactive to light bilaterally.  CN V:  Right: Facial sensation is normal.  Left: Facial sensation is normal on the left.  CN VII:  Right: There is no facial weakness.  Left: There is no facial weakness.  CN VIII:  Right: Hearing is normal.  Left: Hearing is normal.  CN IX, X: Palate elevates symmetrically  CN XI:  Right: Trapezius strength is normal.  Left: Trapezius strength is normal.  CN XII: Tongue midline without atrophy or fasciculations.  Denies any sensory abnormalities of the face or ear.    Motor  Normal muscle bulk throughout.                                               Right                     Left  Elbow flexion                         5                          5  Elbow extension                    5                          5  Wrist flexion                           5                          5  Wrist extension                      5                           5  Finger abduction                    5                          5  Hip flexion                              5                          5  Knee flexion                           5                          5  Knee extension                      5                          5  Ankle inversion                      5                          5  Ankle eversion                       5                          5  Plantarflexion                         5                          5  Dorsiflexion                            5                          5    Sensory  Light touch is normal in upper and lower extremities. Temperature is normal in upper and lower extremities. Vibration is normal in upper and lower extremities.     Reflexes                                            Right                      Left  Brachioradialis                    2+                         2+  Biceps                                 2+                         2+  Patellar                                2+                         2+  Achilles                                1+                         1+    Coordination  Right: Finger-to-nose normal.Left: Finger-to-nose normal.    Gait  Casual gait is normal including stance, stride, and arm swing. Able to rise from chair without using arms.

## 2025-04-04 NOTE — ASSESSMENT & PLAN NOTE
Follow up and surveillance of his lateral temporal region extra-axial mengioma  Lost to follow up, last seen in 2021 with plans for a 2 year follow up.  There are 2 newer and smaller meningiomas as well   Recently in the ED for intermittent left ear tingling (pinna) that sometimes radiates to the jaw/neck. No clumsiness, radiating pain  Exam: brisk reflexes throughout with bilateral Moore's    Imaging:   MRI brain w/wo 4/1/25: 1.  Slight increase in size of the dominant left temporal convexity meningioma, without surrounding edema or mass effect. 2.  Additional smaller meningiomas along the left temporal convexity, posterior and inferior to the dominant lesion, 1 of which is slightly larger, while the second appears new. 3.  No acute infarct. Stable mild chronic white matter microangiopathic changes.    Plan:  Reviewed imaging with patient.  There has been a slight increase in size of the left temporal meningioma compared to initial imaging in 2018 (6mm).  This is not uncommon to see 1-2 mm of growth per year.  He also has two tiny newer meningiomas as well.  Discussed natural history of meningiomas. They are generally asymptomatic, benign and slow growing.    Recommend follow up as directed by the NCCN guidelines.   If there is growth or patient were to have symptoms, there are options for management including surgical resection or SRS.   Discussed option close surveillance versus referral to radiation oncology.  At this point will follow serially to see if there is further progression.  Follow-up in 6 months with MRI brain with/without contrast to see AP.  If imaging stable, anticipate following with surveillance imaging at 6-12-month intervals.  If there is further progression, would consider referral to radiation oncology at that point.  Review red flag s/s.   Call sooner with any questions or concerns.      Orders:    BUN; Future    Creatinine, serum; Future    MRI brain w wo contrast; Future

## 2025-04-08 ENCOUNTER — OFFICE VISIT (OUTPATIENT)
Dept: NEUROSURGERY | Facility: CLINIC | Age: 70
End: 2025-04-08
Payer: COMMERCIAL

## 2025-04-08 VITALS
SYSTOLIC BLOOD PRESSURE: 128 MMHG | OXYGEN SATURATION: 95 % | DIASTOLIC BLOOD PRESSURE: 76 MMHG | RESPIRATION RATE: 18 BRPM | HEIGHT: 73 IN | HEART RATE: 71 BPM | TEMPERATURE: 97.9 F | BODY MASS INDEX: 28.89 KG/M2 | WEIGHT: 218 LBS

## 2025-04-08 DIAGNOSIS — D32.9 MENINGIOMA (HCC): Primary | ICD-10-CM

## 2025-04-08 PROCEDURE — 99215 OFFICE O/P EST HI 40 MIN: CPT | Performed by: PHYSICIAN ASSISTANT

## 2025-04-08 NOTE — PROGRESS NOTES
Name: Magno Mak      : 1955      MRN: [id number]  Encounter Provider: Gloria Menard PA-C  Encounter Date: 2025   Encounter department: [facility] [place]  :  Assessment & Plan  Meningioma (HCC)  Follow up and surveillance of his lateral temporal region extra-axial mengioma  Lost to follow up, last seen in  with plans for a 2 year follow up.  There are 2 newer and smaller meningiomas as well   Recently in the ED for intermittent left ear tingling (pinna) that sometimes radiates to the jaw/neck. No clumsiness, radiating pain  Exam: brisk reflexes throughout with bilateral Moore's    Imaging:   MRI brain w/wo 25: 1.  Slight increase in size of the dominant left temporal convexity meningioma, without surrounding edema or mass effect. 2.  Additional smaller meningiomas along the left temporal convexity, posterior and inferior to the dominant lesion, 1 of which is slightly larger, while the second appears new. 3.  No acute infarct. Stable mild chronic white matter microangiopathic changes.    Plan:  Reviewed imaging with patient.  There has been a slight increase in size of the left temporal meningioma compared to initial imaging in 2018 (6mm).  This is not uncommon to see 1-2 mm of growth per year.  He also has two tiny newer meningiomas as well.  Discussed natural history of meningiomas. They are generally asymptomatic, benign and slow growing.    Recommend follow up as directed by the NCCN guidelines.   If there is growth or patient were to have symptoms, there are options for management including surgical resection or SRS.   Discussed option close surveillance versus referral to radiation oncology.  At this point will follow serially to see if there is further progression.  Follow-up in 6 months with MRI brain with/without contrast to see AP.  If imaging stable, anticipate following with surveillance imaging at 6-12-month intervals.  If there is further  progression, would consider referral to radiation oncology at that point.  Review red flag s/s.   Call sooner with any questions or concerns.      Orders:    BUN; Future    Creatinine, serum; Future    MRI brain w wo contrast; Future        History of Present Illness     Magno Mak is a 69 y.o. male who presents for follow up of a meningioma. Last seen in 2021 with plans for a 2 year follow up, though he was lost to follow up. He was recently in the ED for tingling on the outside of the left ear. It isn't there constantly. It seems to happen more at night when sitting upright in the chair. It runs down into the jaw and into the neck. There is also a numbness sensation. Not as often in the neck. Notes itching in the right ear and a low rumbling in his ear intermittently as well as random pulsing sound that is rare. Notes occasional issues with word finding. No headache, seizures, vision changes. Denies clumsiness, neck pain, radiating arm pain, falls.     HPI     Review of Systems   Constitutional: Negative.    HENT:  Positive for tinnitus (right ear -- felt, heard throbbing noise 1x).    Eyes:  Negative for visual disturbance.   Respiratory: Negative.     Cardiovascular: Negative.    Gastrointestinal:  Negative for nausea and vomiting.   Endocrine: Negative.    Genitourinary: Negative.    Musculoskeletal:  Negative for gait problem.   Skin: Negative.    Allergic/Immunologic: Negative.    Neurological:  Positive for numbness (tingling right ear and right jaw line). Negative for dizziness, speech difficulty, weakness (bilateral wrist -- carpal tunnel) and headaches.   Hematological: Negative.    Psychiatric/Behavioral: Negative.       I have personally reviewed the MA's review of systems and made changes as necessary.    Past Medical History   Past Medical History:   Diagnosis Date    Allergic     Arthritis     Knee pain     Spinal stenosis     Varicose veins of lower extremity     Visual impairment      Past  "Surgical History:   Procedure Laterality Date    COLONOSCOPY      EXCISIONAL HEMORRHOIDECTOMY      KNEE SURGERY      LAMINECTOMY      lumbar    AR PROSTATE NEEDLE BIOPSY ANY APPROACH N/A 5/30/2024    Procedure: TRANSPERINEAL ULTRASOUND GUIDED BIOPSY PROSTATE;  Surgeon: Pavan Tavarez MD;  Location: [facility];  Service: Urology     Family History   Problem Relation Age of Onset    No Known Problems Mother     Skin cancer Sister      he reports that he has never smoked. He has never used smokeless tobacco. He reports current alcohol use. He reports that he does not use drugs.  Current Outpatient Medications   Medication Instructions    betamethasone, augmented, (DIPROLENE-AF) 0.05 % cream     ciclopirox (PENLAC) 8 % solution     Elastic Bandages & Supports (T.E.D. Anti-Embolism Stockings) MISC 1 Application, Does not apply, Daily    meloxicam (MOBIC) 15 mg, Oral, Daily    sodium chloride (DIANA 128) 5 % hypertonic ophthalmic ointment 1 drop, Daily    tamsulosin (FLOMAX) 0.8 mg, Oral, Daily with dinner    urea (CARMOL) 40 % Topical, Daily   No Known Allergies   Objective   /76 (BP Location: Left arm, Patient Position: Sitting, Cuff Size: Standard)   Pulse 71   Temp 97.9 °F (36.6 °C) (Temporal)   Resp 18   Ht 6' 1\" (1.854 m)   Wt 98.9 kg (218 lb)   SpO2 95%   BMI 28.76 kg/m²     Physical Exam  Vitals reviewed.   Constitutional:       General: He is awake.      Appearance: Normal appearance.   HENT:      Head: Normocephalic and atraumatic.   Eyes:      Extraocular Movements: Extraocular movements intact.      Conjunctiva/sclera: Conjunctivae normal.      Pupils: Pupils are equal, round, and reactive to light.   Cardiovascular:      Rate and Rhythm: Normal rate.   Pulmonary:      Effort: Pulmonary effort is normal.   Skin:     General: Skin is warm and dry.   Neurological:      Mental Status: He is alert.      Deep Tendon Reflexes:      Reflex Scores:       Bicep reflexes are 3+ on the right side and " 3+ on the left side.       Brachioradialis reflexes are 3+ on the right side and 3+ on the left side.       Patellar reflexes are 3+ on the right side and 3+ on the left side.  Psychiatric:         Attention and Perception: Attention and perception normal.         Mood and Affect: Mood and affect normal.         Speech: Speech normal.         Behavior: Behavior normal. Behavior is cooperative.         Thought Content: Thought content normal.         Cognition and Memory: Cognition and memory normal.         Judgment: Judgment normal.       Neurological Exam  Mental Status  Awake and alert. Memory is normal. Recent and remote memory are intact. Speech is normal. Able to name objects. Follows two-step commands. Able to perform serial calculations. Fund of knowledge is appropriate for level of education.    Cranial Nerves  CN III, IV, VI: Extraocular movements intact bilaterally. Pupils equal round and reactive to light bilaterally.  CN V:  Right: Facial sensation is normal.  Left: Facial sensation is normal on the left.  CN VII: Full and symmetric facial movement.  CN VIII: Hearing is normal.  CN XI:  Right: Trapezius strength is normal.  Left: Trapezius strength is normal.  CN XII: Tongue midline without atrophy or fasciculations.    Motor  Normal muscle bulk throughout. Normal muscle tone. No pronator drift.                                             Right                     Left  Hip flexion                              5                          5  Plantarflexion                         5                          5  Dorsiflexion                            5                          5                                             Right                     Left   Biceps                                   5                          5   Triceps                                  5                          5   5/5 bilaterally .    Sensory  Light touch is normal in upper and lower extremities.     Reflexes                                             Right                      Left  Brachioradialis                    3+                         3+  Biceps                                 3+                         3+  Patellar                                3+                         3+    Right pathological reflexes: Willi's present. Ankle clonus absent.  Left pathological reflexes: Willi's present. Ankle clonus absent.    Coordination  Right: Finger-to-nose normal.Left: Finger-to-nose normal.    Gait  Casual gait is normal including stance, stride, and arm swing.

## 2025-04-17 ENCOUNTER — APPOINTMENT (OUTPATIENT)
Age: 70
End: 2025-04-17
Payer: COMMERCIAL

## 2025-04-22 ENCOUNTER — OFFICE VISIT (OUTPATIENT)
Age: 70
End: 2025-04-22
Payer: COMMERCIAL

## 2025-04-22 DIAGNOSIS — M25.561 RIGHT KNEE PAIN, UNSPECIFIED CHRONICITY: Primary | ICD-10-CM

## 2025-04-22 PROCEDURE — 97140 MANUAL THERAPY 1/> REGIONS: CPT

## 2025-04-22 NOTE — PROGRESS NOTES
Daily Note/Discharge Summary     Today's date: 2025  Patient name: Magno Mak  : 1955  MRN: [id number]  Referring provider: Michelle Simons PA-C  Dx:   Encounter Diagnosis     ICD-10-CM    1. Right knee pain, unspecified chronicity  M25.561                      Subjective: Patient reports with 2 week hiatus from PT his swelling has remained low.       Objective: See treatment diary below  LOWER EXTREMITY RIGHT CALCULATIONS      Flowsheet Row Most Recent Value   Volume LE (mL) 5961   Difference from last visit (mL)  951   Difference from first visit (mL)  1293   Difference from last visit (%)  14   Difference from first visit (%)  18                Assessment: Magno Mak tolerated MLD to R LE crossing mid sagittal and R transverse  anastomosis to L inguinal and R axillary nodes. Good hydration of skin with no scar. No signs or symptoms of skin infection noted. He has achieved all ST and LT goals. He has good understanding of exercises and self care. Should do well with D/C to maintenance program..      Plan:  D/C patient to HEP as per PT POC.        POC expires Unit limit Auth Expiration date PT/OT/ST + Visit Limit?   25 4 25 9                             Visit/Unit Tracking  AUTH Status:  Date 4/22 1/28 2/3 2/7 2/17 2/20 2/26 3/4 3/12 3/18 3/27 4/1   1/21/25-25 Used 13 2 3 4 5 6 7 8 9 10 11 12    Remaining  7 7 6 5 4 3 2 1 RE 10 9 8     Precautions: Complex PMH including prostate cancer, BPH, microhematuria as well as skin neoplasm and low back pain    Daily Treatment Log  Manuals 3/27 4/1 4/22 2/7 2/17 2/20 2/26 3/4 3/12 3/18   MLD  SJ SJ SJ SJ SJ SJ SJ SJ SJ SJ   measurements   SJ      SJ    Education             garments             Neuro Re-Ed                                                                                                        Ther Ex                                                                                                                     Ther  Activity                                       Gait Training                                       Modalities

## 2025-04-23 ENCOUNTER — OFFICE VISIT (OUTPATIENT)
Age: 70
End: 2025-04-23
Payer: COMMERCIAL

## 2025-04-23 VITALS
HEART RATE: 65 BPM | OXYGEN SATURATION: 96 % | HEIGHT: 73 IN | TEMPERATURE: 97.6 F | WEIGHT: 218.4 LBS | SYSTOLIC BLOOD PRESSURE: 126 MMHG | BODY MASS INDEX: 28.94 KG/M2 | DIASTOLIC BLOOD PRESSURE: 76 MMHG

## 2025-04-23 DIAGNOSIS — R20.0 LEFT FACIAL NUMBNESS: ICD-10-CM

## 2025-04-23 DIAGNOSIS — H93.12 TINNITUS OF LEFT EAR: ICD-10-CM

## 2025-04-23 DIAGNOSIS — I83.891 VARICOSE VEINS OF RIGHT LOWER EXTREMITY WITH EDEMA: Primary | ICD-10-CM

## 2025-04-23 DIAGNOSIS — D32.9 MENINGIOMA (HCC): ICD-10-CM

## 2025-04-23 PROCEDURE — G2211 COMPLEX E/M VISIT ADD ON: HCPCS | Performed by: FAMILY MEDICINE

## 2025-04-23 PROCEDURE — 99214 OFFICE O/P EST MOD 30 MIN: CPT | Performed by: FAMILY MEDICINE

## 2025-04-23 NOTE — ASSESSMENT & PLAN NOTE
Referral to ENT.  Follow-up with neurology appropriately.    Orders:    Ambulatory Referral to Otolaryngology; Future

## 2025-04-23 NOTE — PROGRESS NOTES
"Name: Magno Mak      : 1955      MRN: [id number]  Encounter Provider: Saleem Aguilar DO  Encounter Date: 2025   Encounter department: [facility]  :  Assessment & Plan  Varicose veins of right lower extremity with edema  Continue to follow-up with physical therapy.  Continue elevation and compression socks.       Meningioma (HCC)  Patient will continue to follow with MRIs and neurology/neurosurgery.       Tinnitus of left ear  Referral to ENT.    Orders:    Ambulatory Referral to Otolaryngology; Future    Left facial numbness  Referral to ENT.  Follow-up with neurology appropriately.    Orders:    Ambulatory Referral to Otolaryngology; Future           History of Present Illness   So patient is here following up on lymphedema right leg.  Patient is working with physical therapy.  Patient is wearing compression socks.  Patient is seeing neurology for meningioma.  Patient also has left facial numbness.  Patient did see neurology.  Patient wishes to see ENT.  Patient with some tinnitus left ear.      Review of Systems   Constitutional: Negative.    HENT:  Positive for tinnitus.    Eyes: Negative.    Respiratory: Negative.     Cardiovascular:  Positive for leg swelling.   Gastrointestinal: Negative.    Endocrine: Negative.    Genitourinary: Negative.    Musculoskeletal:  Positive for arthralgias.   Skin: Negative.    Allergic/Immunologic: Negative.    Neurological:  Positive for numbness. Negative for headaches.   Hematological: Negative.    Psychiatric/Behavioral: Negative.         Objective   /76 (BP Location: Right arm, Patient Position: Sitting, Cuff Size: Standard)   Pulse 65   Temp 97.6 °F (36.4 °C) (Temporal)   Ht 6' 1\" (1.854 m) Comment: per last visit  Wt 99.1 kg (218 lb 6.4 oz)   SpO2 96%   BMI 28.81 kg/m²      Physical Exam  Vitals and nursing note reviewed.   Constitutional:       General: He is not in acute distress.     Appearance: Normal appearance. He " is well-developed. He is not ill-appearing, toxic-appearing or diaphoretic.   HENT:      Head: Normocephalic and atraumatic.      Right Ear: Tympanic membrane, ear canal and external ear normal.      Left Ear: Tympanic membrane, ear canal and external ear normal.      Nose: Nose normal.      Mouth/Throat:      Pharynx: No oropharyngeal exudate.   Eyes:      General:         Right eye: No discharge.         Left eye: No discharge.   Neck:      Thyroid: No thyromegaly.      Vascular: No carotid bruit.      Trachea: No tracheal deviation.   Cardiovascular:      Rate and Rhythm: Normal rate and regular rhythm.      Pulses: Normal pulses.      Heart sounds: Normal heart sounds. No murmur heard.     No gallop.   Pulmonary:      Effort: Pulmonary effort is normal. No respiratory distress.      Breath sounds: Normal breath sounds. No stridor. No wheezing or rales.   Chest:      Chest wall: No tenderness.   Musculoskeletal:         General: No tenderness or deformity.      Cervical back: Normal range of motion and neck supple.      Right lower leg: Edema present.      Left lower leg: No edema.   Lymphadenopathy:      Cervical: No cervical adenopathy.   Skin:     General: Skin is warm and dry.      Coloration: Skin is not pale.      Findings: No erythema or rash.   Neurological:      Mental Status: He is alert and oriented to person, place, and time.      Motor: No abnormal muscle tone.      Gait: Gait normal.   Psychiatric:         Mood and Affect: Mood normal.         Behavior: Behavior normal.         Thought Content: Thought content normal.         Judgment: Judgment normal.

## 2025-04-25 ENCOUNTER — TELEPHONE (OUTPATIENT)
Age: 70
End: 2025-04-25

## 2025-04-25 NOTE — TELEPHONE ENCOUNTER
Patient called to report that he canceled his MRI that was scheduled for today because the tingling in ear and jaw stopped now since for like 6/7 days.      Patient stated he has saw his dentist and its not dental related.    Patient stated now his left ear is itching. Patient decided to just cancel the MRI and go see ENT about it.    Any suggestions, comments, questions please call patient.     Thank you

## 2025-05-21 ENCOUNTER — TELEPHONE (OUTPATIENT)
Age: 70
End: 2025-05-21

## 2025-05-21 NOTE — TELEPHONE ENCOUNTER
Patient was calling to confirm his upcoming appt for 6/3. Appt confirmed.     Patient reviewed when he should complete his PSA prior to his appt.     He will be getting it done after the holiday on Tuesday 5/27. Because he was mowing the lawn yesterday and does not want that to effect his results.

## 2025-05-24 ENCOUNTER — APPOINTMENT (OUTPATIENT)
Age: 70
End: 2025-05-24
Payer: COMMERCIAL

## 2025-05-24 DIAGNOSIS — C61 PROSTATE CANCER (HCC): ICD-10-CM

## 2025-05-24 LAB — PSA SERPL-MCNC: 5.88 NG/ML (ref 0–4)

## 2025-05-24 PROCEDURE — 84153 ASSAY OF PSA TOTAL: CPT

## 2025-05-24 PROCEDURE — 36415 COLL VENOUS BLD VENIPUNCTURE: CPT

## 2025-06-03 ENCOUNTER — OFFICE VISIT (OUTPATIENT)
Dept: UROLOGY | Facility: MEDICAL CENTER | Age: 70
End: 2025-06-03
Payer: COMMERCIAL

## 2025-06-03 VITALS
WEIGHT: 216.8 LBS | SYSTOLIC BLOOD PRESSURE: 116 MMHG | HEIGHT: 73 IN | BODY MASS INDEX: 28.73 KG/M2 | OXYGEN SATURATION: 96 % | HEART RATE: 85 BPM | DIASTOLIC BLOOD PRESSURE: 70 MMHG

## 2025-06-03 DIAGNOSIS — N40.1 BENIGN PROSTATIC HYPERPLASIA WITH URINARY OBSTRUCTION: ICD-10-CM

## 2025-06-03 DIAGNOSIS — C61 PROSTATE CANCER (HCC): Primary | ICD-10-CM

## 2025-06-03 DIAGNOSIS — R31.29 OTHER MICROSCOPIC HEMATURIA: ICD-10-CM

## 2025-06-03 DIAGNOSIS — N13.8 BENIGN PROSTATIC HYPERPLASIA WITH URINARY OBSTRUCTION: ICD-10-CM

## 2025-06-03 LAB
BACTERIA UR QL AUTO: ABNORMAL /HPF
BILIRUB UR QL STRIP: NEGATIVE
CLARITY UR: CLEAR
COLOR UR: ABNORMAL
GLUCOSE UR STRIP-MCNC: NEGATIVE MG/DL
HGB UR QL STRIP.AUTO: NEGATIVE
KETONES UR STRIP-MCNC: NEGATIVE MG/DL
LEUKOCYTE ESTERASE UR QL STRIP: NEGATIVE
NITRITE UR QL STRIP: NEGATIVE
NON-SQ EPI CELLS URNS QL MICRO: ABNORMAL /HPF
PH UR STRIP.AUTO: 6 [PH]
PROT UR STRIP-MCNC: NEGATIVE MG/DL
RBC #/AREA URNS AUTO: ABNORMAL /HPF
SL AMB  POCT GLUCOSE, UA: ABNORMAL
SL AMB LEUKOCYTE ESTERASE,UA: ABNORMAL
SL AMB POCT BILIRUBIN,UA: ABNORMAL
SL AMB POCT BLOOD,UA: ABNORMAL
SL AMB POCT CLARITY,UA: CLEAR
SL AMB POCT COLOR,UA: YELLOW
SL AMB POCT KETONES,UA: ABNORMAL
SL AMB POCT NITRITE,UA: ABNORMAL
SL AMB POCT PH,UA: 5.5
SL AMB POCT SPECIFIC GRAVITY,UA: 1.01
SL AMB POCT URINE PROTEIN: ABNORMAL
SL AMB POCT UROBILINOGEN: 0.2
SP GR UR STRIP.AUTO: 1.01 (ref 1–1.03)
UROBILINOGEN UR STRIP-ACNC: <2 MG/DL
WBC #/AREA URNS AUTO: ABNORMAL /HPF

## 2025-06-03 PROCEDURE — 81003 URINALYSIS AUTO W/O SCOPE: CPT | Performed by: UROLOGY

## 2025-06-03 PROCEDURE — 99214 OFFICE O/P EST MOD 30 MIN: CPT | Performed by: UROLOGY

## 2025-06-03 PROCEDURE — 81001 URINALYSIS AUTO W/SCOPE: CPT | Performed by: UROLOGY

## 2025-06-03 RX ORDER — TAMSULOSIN HYDROCHLORIDE 0.4 MG/1
0.8 CAPSULE ORAL
Qty: 180 CAPSULE | Refills: 3 | Status: SHIPPED | OUTPATIENT
Start: 2025-06-03

## 2025-06-03 NOTE — PATIENT INSTRUCTIONS
"  Patient Education     Choosing treatment for low-risk localized prostate cancer   The Basics   Written by the doctors and editors at [facility]   What is low-risk localized prostate cancer? -- Low-risk localized prostate cancer is prostate cancer that is very slow growing and only in the prostate gland (figure 1). It has not spread outside of the prostate gland.  Because this type of prostate cancer usually grows slowly, it does not usually lead to death from the cancer. Many men with low-risk localized prostate cancer die from other medical problems, such as heart disease, and not from their prostate cancer.  But some low-risk localized prostate cancers will become serious and can lead to death if not treated. Unfortunately, doctors often can't tell for sure which low-risk localized prostate cancers will never cause any problems and which ones are more serious.  What are the treatment choices for low-risk localized prostate cancer? -- In general, most men can choose from the following different treatments for low-risk localized prostate cancer:   Active surveillance - If you choose this option, you will not have treatment for your cancer right away. But your doctor will do exams and tests on a regular basis. This is to check whether the cancer is growing or becoming more aggressive. If and when it does, you will then start active treatment. Many but not all men who choose active surveillance end up having treatment for their cancer at some later point.   Prostatectomy, which is surgery to remove the prostate gland   Radiation therapy - Radiation kills cancer cells. You can get radiation therapy in 2 different ways:   In \"external-beam radiation therapy,\" the radiation comes from a machine that is outside the body. This involves getting radiation every day, Monday through Friday, for 4 to 8 weeks.    In \"brachytherapy,\" the radiation comes from a source of radiation that is put into the prostate gland. This involves " "a 1-time treatment.  Some men, especially those who are older or have serious medical conditions, might choose not to do any of the above. Instead, they might choose \"watchful waiting.\" Watchful waiting is not exactly the same as active surveillance. It does not require regular testing, but involves treating symptoms if they happen.  How do I choose among the different treatments? -- First, make sure you understand all the facts about your choices (table 1). Ask your doctor any questions you have. Then to help you decide, think about how you feel about:   What the treatment involves   Benefits of the treatment - With active surveillance, you can delay and perhaps avoid the side effects from surgery or radiation. With surgery, external-beam radiation therapy, and brachytherapy, your cancer is treated right away before it can grow or spread.   Downsides and side effects of the treatment - If you choose active surveillance, you will need to be monitored by your doctor for a long time, possibly for the rest of your life. This might include having many biopsies and/or imaging tests. Another downside of active surveillance is knowing you have cancer and worrying about it. Also, sometimes the cancer starts growing quickly and becomes harder to treat.  Surgery, external-beam radiation therapy, and brachytherapy can all cause problems with sex. This includes trouble getting or keeping an erection. With surgery, this problem usually happens right away. With external-beam radiation therapy and brachytherapy, this problem can happen later on.  Surgery can cause incontinence, or leaking of urine. Also, side effects such as pain, infection, and bleeding can happen with any type of surgery.  External-beam radiation therapy and brachytherapy have short-term and long-term side effects. Short-term, they can cause frequent bowel movements and other bowel problems. They can also cause problems with urination, including the need to " urinate often or pain with urination. Long-term, these treatments sometimes cause incontinence, but this is less common than with surgery.   How the treatment could affect your sex life - An important issue for many men is how treatment can affect their sex life. Active surveillance doesn't usually affect your sex life. But surgery, external-beam radiation therapy, and brachytherapy can cause trouble getting or keeping an erection. If you have problems with these, there are treatments (medicines or devices) that might be able to help.  Your treatment might also depend on your age and general health. For example, younger men in good health often choose surgery. Older men, especially those with other medical conditions, might choose active surveillance.  Also, people with certain medical conditions cannot have some treatments. For instance, men with long-term diarrhea usually can't have external-beam radiation therapy or brachytherapy. Brachytherapy is not usually recommended for men who have very a large prostate that is causing problems with urinating.  What are the chances my cancer will come back after treatment? -- After treatment with surgery or radiation therapy, there is a very low chance that your cancer will come back.  How do I work with my doctor to make a decision? -- To make a decision, let your doctor know how you feel about the different treatments and whether there is something specific that worries you. Then listen to what your doctor has to say about their experiences with men who had situations similar to yours. Together, you can decide which treatment is right for you.  All topics are updated as new evidence becomes available and our peer review process is complete.  This topic retrieved from [employer] on: Feb 26, 2024.  Topic 58256 Version 13.0  Release: 32.2.4 - C32.56  © 2024 UpToDate, Inc. and/or its affiliates. All rights reserved.  figure 1: Prostate gland     This drawing shows male internal  organs and a close-up of the prostate gland.  Graphic 35430 Version 5.0  table 1: Choosing treatment for low-risk localized prostate cancer     Active surveillance  Prostatectomy (surgery to remove the prostate gland)  External-beam radiation therapy  Brachytherapy    What are the benefits? You can delay and perhaps avoid the side effects that can come with surgery or radiation. The cancer is treated right away. The cancer is treated right away. The cancer is treated right away.   What are the downsides or side effects? You will need regular monitoring and testing.  You know that you have cancer in your body, which might worry you.  Your cancer might start growing more quickly and be harder to treat. This is major surgery. All surgeries can cause pain, infection, or bleeding.  Side effects can include trouble with sex and leaking urine. Short-term side effects include having frequent bowel movements or urinating often.  Long-term side effects can include trouble with sex or having frequent bowel movements. You need to avoid being near children or pregnant women during treatment.  Short-term side effects can include having frequent bowel movements, urinating a lot, or having pain with urination.  Long-term side effects can include trouble with sex and leaking urine.   Will I need further treatment? Yes, if your cancer starts to grow or become more aggressive. Probably not Probably not Probably not   What is the chance that my cancer will come back after treatment? The cancer stays in your body until it is treated. Many men will have treatment for their cancer at some point. Very low Very low Very low   Graphic 58895 Version 5.0  Consumer Information Use and Disclaimer   Disclaimer: This generalized information is a limited summary of diagnosis, treatment, and/or medication information. It is not meant to be comprehensive and should be used as a tool to help the user understand and/or assess potential diagnostic and  treatment options. It does NOT include all information about conditions, treatments, medications, side effects, or risks that may apply to a specific patient. It is not intended to be medical advice or a substitute for the medical advice, diagnosis, or treatment of a health care provider based on the health care provider's examination and assessment of a patient's specific and unique circumstances. Patients must speak with a health care provider for complete information about their health, medical questions, and treatment options, including any risks or benefits regarding use of medications. This information does not endorse any treatments or medications as safe, effective, or approved for treating a specific patient. UpToDate, Inc. and its affiliates disclaim any warranty or liability relating to this information or the use thereof.The use of this information is governed by the Terms of Use, available at https://www.Cmxtwenty.Avacen/en/know/clinical-effectiveness-terms. 2024© UpToDate, Inc. and its affiliates and/or licensors. All rights reserved.  Copyright   © 2024 UpToDate, Inc. and/or its affiliates. All rights reserved.    Patient Education     Choosing treatment for low-risk localized prostate cancer   The Basics   Written by the doctors and editors at [facility]   What is low-risk localized prostate cancer? -- Low-risk localized prostate cancer is prostate cancer that is very slow growing and only in the prostate gland (figure 1). It has not spread outside of the prostate gland.  Because this type of prostate cancer usually grows slowly, it does not usually lead to death from the cancer. Many men with low-risk localized prostate cancer die from other medical problems, such as heart disease, and not from their prostate cancer.  But some low-risk localized prostate cancers will become serious and can lead to death if not treated. Unfortunately, doctors often can't tell for sure which low-risk localized prostate  "cancers will never cause any problems and which ones are more serious.  What are the treatment choices for low-risk localized prostate cancer? -- In general, most men can choose from the following different treatments for low-risk localized prostate cancer:   Active surveillance - If you choose this option, you will not have treatment for your cancer right away. But your doctor will do exams and tests on a regular basis. This is to check whether the cancer is growing or becoming more aggressive. If and when it does, you will then start active treatment. Many but not all men who choose active surveillance end up having treatment for their cancer at some later point.   Prostatectomy, which is surgery to remove the prostate gland   Radiation therapy - Radiation kills cancer cells. You can get radiation therapy in 2 different ways:   In \"external-beam radiation therapy,\" the radiation comes from a machine that is outside the body. This involves getting radiation every day, Monday through Friday, for 4 to 8 weeks.    In \"brachytherapy,\" the radiation comes from a source of radiation that is put into the prostate gland. This involves a 1-time treatment.  Some men, especially those who are older or have serious medical conditions, might choose not to do any of the above. Instead, they might choose \"watchful waiting.\" Watchful waiting is not exactly the same as active surveillance. It does not require regular testing, but involves treating symptoms if they happen.  How do I choose among the different treatments? -- First, make sure you understand all the facts about your choices (table 1). Ask your doctor any questions you have. Then to help you decide, think about how you feel about:   What the treatment involves   Benefits of the treatment - With active surveillance, you can delay and perhaps avoid the side effects from surgery or radiation. With surgery, external-beam radiation therapy, and brachytherapy, your cancer is " treated right away before it can grow or spread.   Downsides and side effects of the treatment - If you choose active surveillance, you will need to be monitored by your doctor for a long time, possibly for the rest of your life. This might include having many biopsies and/or imaging tests. Another downside of active surveillance is knowing you have cancer and worrying about it. Also, sometimes the cancer starts growing quickly and becomes harder to treat.  Surgery, external-beam radiation therapy, and brachytherapy can all cause problems with sex. This includes trouble getting or keeping an erection. With surgery, this problem usually happens right away. With external-beam radiation therapy and brachytherapy, this problem can happen later on.  Surgery can cause incontinence, or leaking of urine. Also, side effects such as pain, infection, and bleeding can happen with any type of surgery.  External-beam radiation therapy and brachytherapy have short-term and long-term side effects. Short-term, they can cause frequent bowel movements and other bowel problems. They can also cause problems with urination, including the need to urinate often or pain with urination. Long-term, these treatments sometimes cause incontinence, but this is less common than with surgery.   How the treatment could affect your sex life - An important issue for many men is how treatment can affect their sex life. Active surveillance doesn't usually affect your sex life. But surgery, external-beam radiation therapy, and brachytherapy can cause trouble getting or keeping an erection. If you have problems with these, there are treatments (medicines or devices) that might be able to help.  Your treatment might also depend on your age and general health. For example, younger men in good health often choose surgery. Older men, especially those with other medical conditions, might choose active surveillance.  Also, people with certain medical conditions  cannot have some treatments. For instance, men with long-term diarrhea usually can't have external-beam radiation therapy or brachytherapy. Brachytherapy is not usually recommended for men who have very a large prostate that is causing problems with urinating.  What are the chances my cancer will come back after treatment? -- After treatment with surgery or radiation therapy, there is a very low chance that your cancer will come back.  How do I work with my doctor to make a decision? -- To make a decision, let your doctor know how you feel about the different treatments and whether there is something specific that worries you. Then listen to what your doctor has to say about their experiences with men who had situations similar to yours. Together, you can decide which treatment is right for you.  All topics are updated as new evidence becomes available and our peer review process is complete.  This topic retrieved from [employer] on: Feb 26, 2024.  Topic 11602 Version 13.0  Release: 32.2.4 - C32.56  © 2024 UpToDate, Inc. and/or its affiliates. All rights reserved.  figure 1: Prostate gland     This drawing shows male internal organs and a close-up of the prostate gland.  Graphic 85224 Version 5.0  table 1: Choosing treatment for low-risk localized prostate cancer     Active surveillance  Prostatectomy (surgery to remove the prostate gland)  External-beam radiation therapy  Brachytherapy    What are the benefits? You can delay and perhaps avoid the side effects that can come with surgery or radiation. The cancer is treated right away. The cancer is treated right away. The cancer is treated right away.   What are the downsides or side effects? You will need regular monitoring and testing.  You know that you have cancer in your body, which might worry you.  Your cancer might start growing more quickly and be harder to treat. This is major surgery. All surgeries can cause pain, infection, or bleeding.  Side effects can  include trouble with sex and leaking urine. Short-term side effects include having frequent bowel movements or urinating often.  Long-term side effects can include trouble with sex or having frequent bowel movements. You need to avoid being near children or pregnant women during treatment.  Short-term side effects can include having frequent bowel movements, urinating a lot, or having pain with urination.  Long-term side effects can include trouble with sex and leaking urine.   Will I need further treatment? Yes, if your cancer starts to grow or become more aggressive. Probably not Probably not Probably not   What is the chance that my cancer will come back after treatment? The cancer stays in your body until it is treated. Many men will have treatment for their cancer at some point. Very low Very low Very low   Graphic 62752 Version 5.0  Consumer Information Use and Disclaimer   Disclaimer: This generalized information is a limited summary of diagnosis, treatment, and/or medication information. It is not meant to be comprehensive and should be used as a tool to help the user understand and/or assess potential diagnostic and treatment options. It does NOT include all information about conditions, treatments, medications, side effects, or risks that may apply to a specific patient. It is not intended to be medical advice or a substitute for the medical advice, diagnosis, or treatment of a health care provider based on the health care provider's examination and assessment of a patient's specific and unique circumstances. Patients must speak with a health care provider for complete information about their health, medical questions, and treatment options, including any risks or benefits regarding use of medications. This information does not endorse any treatments or medications as safe, effective, or approved for treating a specific patient. UpToDate, Inc. and its affiliates disclaim any warranty or liability relating  to this information or the use thereof.The use of this information is governed by the Terms of Use, available at https://www.wolterskluwer.com/en/know/clinical-effectiveness-terms. 2024© UpToDate, Inc. and its affiliates and/or licensors. All rights reserved.  Copyright   © 2024 [employer] and/or its affiliates. All rights reserved.

## 2025-06-03 NOTE — PROGRESS NOTES
Name: Magno Mak      : 1955      MRN: [id number]  Encounter Provider: Neil Rose MD  Encounter Date: 6/3/2025   Encounter department: [facility]  :  Assessment & Plan  Prostate cancer (HCC)  He is doing well. Most recent PSA is 5.878.  Options were discussed and we will plan to obtain a multiparametric MRI of the prostate at this time.  Depending on the results we can consider the timing of confirmatory biopsies.  Orders:    POCT urine dip auto non-scope    MRI prostate multiparametric wo w contrast; Future    Basic metabolic panel; Future    PSA Total, Diagnostic; Future    Benign prostatic hyperplasia with urinary obstruction  AUA symptom score 17.  At this point he is satisfied with his voiding pattern.  He will try to decrease his evening fluids.  We will continue to follow on tamsulosin.  Orders:    POCT urine dip auto non-scope    Basic metabolic panel; Future    tamsulosin (FLOMAX) 0.4 mg; Take 2 capsules (0.8 mg total) by mouth daily with dinner    Other microscopic hematuria  Small blood is noted on dipstick.  We will obtain a microscopic analysis to help assess significance.  Orders:    Urinalysis with microscopic        History of Present Illness   Magno Mak is a 69 y.o. male who presents for follow-up.  He has a history of low risk prostate cancer and is now 1 year post prostate biopsies.  He reports no change in his voiding pattern.  He remains on tamsulosin 0.8 mg daily.  He does have episodes of incomplete bladder emptying and hesitancy.  There is no gross hematuria or symptoms of infection.  He gets up 4 times at night but at this point is satisfied with his voiding pattern.  There are no constitutional symptoms.    Review of Systems   Constitutional:  Negative for chills, diaphoresis, fatigue and fever.   HENT: Negative.     Eyes: Negative.    Respiratory: Negative.     Cardiovascular: Negative.    Endocrine: Negative.    Genitourinary:         " See HPI   Musculoskeletal: Negative.    Skin: Negative.    Allergic/Immunologic: Negative.    Neurological: Negative.    Hematological: Negative.    Psychiatric/Behavioral: Negative.             Objective   /70 (BP Location: Left arm, Patient Position: Sitting, Cuff Size: Adult)   Pulse 85   Ht 6' 1\" (1.854 m)   Wt 98.3 kg (216 lb 12.8 oz)   SpO2 96%   BMI 28.60 kg/m²     Physical Exam  Constitutional:       General: He is not in acute distress.     Appearance: Normal appearance. He is well-developed and normal weight. He is not ill-appearing, toxic-appearing or diaphoretic.   HENT:      Head: Normocephalic and atraumatic.     Eyes:      General: No scleral icterus.     Conjunctiva/sclera: Conjunctivae normal.       Cardiovascular:      Rate and Rhythm: Normal rate.   Pulmonary:      Effort: Pulmonary effort is normal.   Abdominal:      General: Abdomen is flat. There is no distension.      Palpations: There is no mass.      Tenderness: There is no abdominal tenderness. There is no right CVA tenderness, left CVA tenderness, guarding or rebound.      Hernia: No hernia is present.   Genitourinary:     Penis: Normal.       Testes: Normal.      Rectum: Normal.      Comments: Prostate moderately enlarged, smooth, symmetric and palpably benign.    Musculoskeletal:      Cervical back: Neck supple.     Skin:     General: Skin is warm and dry.     Neurological:      General: No focal deficit present.      Mental Status: He is alert and oriented to person, place, and time.     Psychiatric:         Mood and Affect: Mood normal.         Behavior: Behavior normal.         Thought Content: Thought content normal.         Judgment: Judgment normal.         Results   Lab Results   Component Value Date    PSA 5.878 (H) 05/24/2025    PSA 5.387 (H) 03/08/2025    PSA 5.243 (H) 12/03/2024     Lab Results   Component Value Date    CALCIUM 8.7 03/01/2025    K 4.9 03/01/2025    CO2 27 03/01/2025     03/01/2025    BUN 14 " 03/01/2025    CREATININE 0.76 03/01/2025     Lab Results   Component Value Date    WBC 5.60 03/01/2025    HGB 14.0 03/01/2025    HCT 42.3 03/01/2025    MCV 89 03/01/2025     03/01/2025       Office Urine Dip  Recent Results (from the past hour)   POCT urine dip auto non-scope    Collection Time: 06/03/25  1:14 PM   Result Value Ref Range     COLOR,UA Yellow     CLARITY,UA Clear     SPECIFIC GRAVITY,UA 1.015      PH,UA 5.5     LEUKOCYTE ESTERASE,UA Neg     NITRITE,UA Neg     GLUCOSE, UA Neg     KETONES,UA Neg     BILIRUBIN,UA Neg     BLOOD,UA Small     POCT URINE PROTEIN Neg     SL AMB POCT UROBILINOGEN 0.2

## 2025-06-03 NOTE — LETTER
Sarah 3, 2025     Gerardo Aguilar DO  [address]  [address]  [facility] [address]    Patient: Magno Mak   YOB: 1955   Date of Visit: 6/3/2025       Dear Dr. Gerardo Aguilar DO:    Thank you for referring Magno Mak to me for evaluation. Below are my notes for this consultation.    If you have questions, please do not hesitate to call me. I look forward to following your patient along with you.         Sincerely,        Neil Rose MD        CC: No Recipients    Neil Rose MD  6/3/2025  1:39 PM  Sign when Signing Visit  Name: Magno Mak      : 1955      MRN: [id number]  Encounter Provider: Neil Rose MD  Encounter Date: 6/3/2025   Encounter department: [facility] [place]  :  Assessment & Plan  Prostate cancer (HCC)  He is doing well. Most recent PSA is 5.878.  Options were discussed and we will plan to obtain a multiparametric MRI of the prostate at this time.  Depending on the results we can consider the timing of confirmatory biopsies.  Orders:  •  POCT urine dip auto non-scope  •  MRI prostate multiparametric wo w contrast; Future  •  Basic metabolic panel; Future  •  PSA Total, Diagnostic; Future    Benign prostatic hyperplasia with urinary obstruction  AUA symptom score 17.  At this point he is satisfied with his voiding pattern.  He will try to decrease his evening fluids.  We will continue to follow on tamsulosin.  Orders:  •  POCT urine dip auto non-scope  •  Basic metabolic panel; Future  •  tamsulosin (FLOMAX) 0.4 mg; Take 2 capsules (0.8 mg total) by mouth daily with dinner    Other microscopic hematuria  Small blood is noted on dipstick.  We will obtain a microscopic analysis to help assess significance.  Orders:  •  Urinalysis with microscopic        History of Present Illness  Magno Mak is a 69 y.o. male who presents for follow-up.  He has a history of low risk prostate cancer and is now 1 year post prostate biopsies.   "He reports no change in his voiding pattern.  He remains on tamsulosin 0.8 mg daily.  He does have episodes of incomplete bladder emptying and hesitancy.  There is no gross hematuria or symptoms of infection.  He gets up 4 times at night but at this point is satisfied with his voiding pattern.  There are no constitutional symptoms.    Review of Systems   Constitutional:  Negative for chills, diaphoresis, fatigue and fever.   HENT: Negative.     Eyes: Negative.    Respiratory: Negative.     Cardiovascular: Negative.    Endocrine: Negative.    Genitourinary:         See HPI   Musculoskeletal: Negative.    Skin: Negative.    Allergic/Immunologic: Negative.    Neurological: Negative.    Hematological: Negative.    Psychiatric/Behavioral: Negative.             Objective  /70 (BP Location: Left arm, Patient Position: Sitting, Cuff Size: Adult)   Pulse 85   Ht 6' 1\" (1.854 m)   Wt 98.3 kg (216 lb 12.8 oz)   SpO2 96%   BMI 28.60 kg/m²     Physical Exam  Constitutional:       General: He is not in acute distress.     Appearance: Normal appearance. He is well-developed and normal weight. He is not ill-appearing, toxic-appearing or diaphoretic.   HENT:      Head: Normocephalic and atraumatic.     Eyes:      General: No scleral icterus.     Conjunctiva/sclera: Conjunctivae normal.       Cardiovascular:      Rate and Rhythm: Normal rate.   Pulmonary:      Effort: Pulmonary effort is normal.   Abdominal:      General: Abdomen is flat. There is no distension.      Palpations: There is no mass.      Tenderness: There is no abdominal tenderness. There is no right CVA tenderness, left CVA tenderness, guarding or rebound.      Hernia: No hernia is present.   Genitourinary:     Penis: Normal.       Testes: Normal.      Rectum: Normal.      Comments: Prostate moderately enlarged, smooth, symmetric and palpably benign.    Musculoskeletal:      Cervical back: Neck supple.     Skin:     General: Skin is warm and dry. "     Neurological:      General: No focal deficit present.      Mental Status: He is alert and oriented to person, place, and time.     Psychiatric:         Mood and Affect: Mood normal.         Behavior: Behavior normal.         Thought Content: Thought content normal.         Judgment: Judgment normal.         Results   Lab Results   Component Value Date    PSA 5.878 (H) 05/24/2025    PSA 5.387 (H) 03/08/2025    PSA 5.243 (H) 12/03/2024     Lab Results   Component Value Date    CALCIUM 8.7 03/01/2025    K 4.9 03/01/2025    CO2 27 03/01/2025     03/01/2025    BUN 14 03/01/2025    CREATININE 0.76 03/01/2025     Lab Results   Component Value Date    WBC 5.60 03/01/2025    HGB 14.0 03/01/2025    HCT 42.3 03/01/2025    MCV 89 03/01/2025     03/01/2025       Office Urine Dip  Recent Results (from the past hour)   POCT urine dip auto non-scope    Collection Time: 06/03/25  1:14 PM   Result Value Ref Range     COLOR,UA Yellow     CLARITY,UA Clear     SPECIFIC GRAVITY,UA 1.015      PH,UA 5.5     LEUKOCYTE ESTERASE,UA Neg     NITRITE,UA Neg     GLUCOSE, UA Neg     KETONES,UA Neg     BILIRUBIN,UA Neg     BLOOD,UA Small     POCT URINE PROTEIN Neg     SL AMB POCT UROBILINOGEN 0.2

## 2025-06-03 NOTE — ASSESSMENT & PLAN NOTE
AUA symptom score 17.  At this point he is satisfied with his voiding pattern.  He will try to decrease his evening fluids.  We will continue to follow on tamsulosin.  Orders:    POCT urine dip auto non-scope    Basic metabolic panel; Future    tamsulosin (FLOMAX) 0.4 mg; Take 2 capsules (0.8 mg total) by mouth daily with dinner

## 2025-06-03 NOTE — ASSESSMENT & PLAN NOTE
He is doing well. Most recent PSA is 5.878.  Options were discussed and we will plan to obtain a multiparametric MRI of the prostate at this time.  Depending on the results we can consider the timing of confirmatory biopsies.  Orders:    POCT urine dip auto non-scope    MRI prostate multiparametric wo w contrast; Future    Basic metabolic panel; Future    PSA Total, Diagnostic; Future

## 2025-06-11 ENCOUNTER — HOSPITAL ENCOUNTER (OUTPATIENT)
Facility: MEDICAL CENTER | Age: 70
Discharge: HOME | End: 2025-06-11
Attending: UROLOGY
Payer: COMMERCIAL

## 2025-06-11 DIAGNOSIS — C61 PROSTATE CANCER (HCC): ICD-10-CM

## 2025-06-11 PROCEDURE — 72197 MRI PELVIS W/O & W/DYE: CPT

## 2025-06-11 PROCEDURE — A9585 GADOBUTROL INJECTION: HCPCS | Performed by: UROLOGY

## 2025-06-11 PROCEDURE — 76377 3D RENDER W/INTRP POSTPROCES: CPT

## 2025-06-11 RX ORDER — GADOBUTROL 604.72 MG/ML
9 INJECTION INTRAVENOUS
Status: COMPLETED | OUTPATIENT
Start: 2025-06-11 | End: 2025-06-11

## 2025-06-11 RX ADMIN — GADOBUTROL 9 ML: 604.72 INJECTION INTRAVENOUS at 13:07

## 2025-07-03 ENCOUNTER — TELEPHONE (OUTPATIENT)
Dept: UROLOGY | Facility: MEDICAL CENTER | Age: 70
End: 2025-07-03

## 2025-07-03 ENCOUNTER — TELEPHONE (OUTPATIENT)
Age: 70
End: 2025-07-03

## 2025-07-03 NOTE — TELEPHONE ENCOUNTER
Patient was calling to discuss his MRI Results. It has been a few weeks and he has not heard from the provider.    Can this please be reviewed with the provider and the patient called back?    Patient is aware it is a holiday weekend and said a call back Monday or Tuesday is fine.     Magno   [phone]    MRI ordered by and also last seen by Dr. oRse

## 2025-07-07 NOTE — TELEPHONE ENCOUNTER
Patient called stating he is returning the call from Dr. Rose. He called him on 07/03/25.  He just listen to his voicemail.  He will be available anytime tomorrow .     He can be reached at [phone]

## 2025-07-08 ENCOUNTER — TELEPHONE (OUTPATIENT)
Dept: UROLOGY | Facility: MEDICAL CENTER | Age: 70
End: 2025-07-08

## 2025-07-08 ENCOUNTER — PREP FOR PROCEDURE (OUTPATIENT)
Dept: UROLOGY | Facility: MEDICAL CENTER | Age: 70
End: 2025-07-08

## 2025-07-08 DIAGNOSIS — R93.89 ABNORMAL MRI: ICD-10-CM

## 2025-07-08 DIAGNOSIS — R31.29 OTHER MICROSCOPIC HEMATURIA: ICD-10-CM

## 2025-07-08 DIAGNOSIS — C61 PROSTATE CANCER (HCC): Primary | ICD-10-CM

## 2025-07-08 NOTE — TELEPHONE ENCOUNTER
Spoke to patient today regarding his multiparametric MRI which reveals a PI-RADS 4 lesion.  He is on active surveillance for low risk prostate cancer.  His last biopsies were 1 year ago.  I recommended proceeding with fusion guided prostate biopsies.  The procedure was described.  Risks were discussed.  He is amenable to proceeding.  Orders were placed.

## 2025-07-24 ENCOUNTER — PREP FOR PROCEDURE (OUTPATIENT)
Dept: UROLOGY | Facility: MEDICAL CENTER | Age: 70
End: 2025-07-24

## 2025-07-24 DIAGNOSIS — Z01.812 PRE-OPERATIVE LABORATORY EXAMINATION: ICD-10-CM

## 2025-07-24 DIAGNOSIS — Z01.810 PRE-OPERATIVE CARDIOVASCULAR EXAMINATION: ICD-10-CM

## 2025-07-24 DIAGNOSIS — R39.89 SUSPECTED UTI: ICD-10-CM

## 2025-07-24 DIAGNOSIS — C61 PROSTATE CANCER (HCC): Primary | ICD-10-CM

## (undated) DEVICE — 3M™ TRANSPORE™ WHITE SURGICAL TAPE 1534-1, 1 INCH X 10 YARD (2,5CM X 9,1M), 12 ROLLS/CARTON 10 CARTONS/CASE: Brand: 3M™ TRANSPORE™

## (undated) DEVICE — GLOVE INDICATOR PI UNDERGLOVE SZ 8 BLUE

## (undated) DEVICE — RENTAL PROBE ULTRASOUND DROP IN BK5000

## (undated) DEVICE — SCD SEQUENTIAL COMPRESSION COMFORT SLEEVE MEDIUM KNEE LENGTH: Brand: KENDALL SCD

## (undated) DEVICE — UTILITY MARKER,BLACK WITH LABELS: Brand: DEVON

## (undated) DEVICE — SYRINGE,TOOMEY,IRRIGATION,70CC,STERILE: Brand: MEDLINE

## (undated) DEVICE — LUBRICANT JELLY SURGILUBE TUBE 2OZ FLIP TOP

## (undated) DEVICE — ULTRASOUND GEL STERILE FOIL PK

## (undated) DEVICE — NEEDLE 25G X 1 1/2

## (undated) DEVICE — SPONGE 4 X 4 XRAY 16 PLY STRL LF RFD

## (undated) DEVICE — PREP PAD BNS: Brand: CONVERTORS

## (undated) DEVICE — SYRINGE 10ML LL

## (undated) DEVICE — FORMALIN PRE-FILLED 10 PCT PROSTATE BIOPSY

## (undated) DEVICE — MAX-CORE® DISPOSABLE CORE BIOPSY INSTRUMENT, 18G X 25CM: Brand: MAX-CORE

## (undated) DEVICE — TELFA NON-ADHERENT ABSORBENT DRESSING: Brand: TELFA

## (undated) DEVICE — SPECIMEN CONTAINER STERILE PEEL PACK

## (undated) DEVICE — CHLORAPREP HI-LITE 26ML ORANGE

## (undated) DEVICE — GLOVE SRG BIOGEL 7.5

## (undated) DEVICE — STERILE (2 X 30CM) LATEX COVER: Brand: PROCOVERS™

## (undated) DEVICE — SYSTEM TRANSPERINEAL ACCESS PRECISIONPOINT

## (undated) DEVICE — RAZOR STERILE

## (undated) DEVICE — 3M™ IOBAN™ 2 ANTIMICROBIAL INCISE DRAPE 6650EZ: Brand: IOBAN™ 2